# Patient Record
Sex: FEMALE | Race: ASIAN | NOT HISPANIC OR LATINO | URBAN - METROPOLITAN AREA
[De-identification: names, ages, dates, MRNs, and addresses within clinical notes are randomized per-mention and may not be internally consistent; named-entity substitution may affect disease eponyms.]

---

## 2017-07-29 ENCOUNTER — EMERGENCY (EMERGENCY)
Facility: HOSPITAL | Age: 37
LOS: 1 days | Discharge: HOME | End: 2017-07-29
Attending: EMERGENCY MEDICINE | Admitting: EMERGENCY MEDICINE
Payer: COMMERCIAL

## 2017-07-29 VITALS
DIASTOLIC BLOOD PRESSURE: 80 MMHG | RESPIRATION RATE: 16 BRPM | OXYGEN SATURATION: 100 % | TEMPERATURE: 98 F | HEART RATE: 90 BPM | SYSTOLIC BLOOD PRESSURE: 131 MMHG

## 2017-07-29 VITALS — HEIGHT: 62 IN | WEIGHT: 160.06 LBS

## 2017-07-29 LAB
ALBUMIN SERPL ELPH-MCNC: 3.8 G/DL — SIGNIFICANT CHANGE UP (ref 3.3–5)
ALP SERPL-CCNC: 77 U/L — SIGNIFICANT CHANGE UP (ref 40–120)
ALT FLD-CCNC: 7 U/L — SIGNIFICANT CHANGE UP (ref 4–33)
AST SERPL-CCNC: 9 U/L — SIGNIFICANT CHANGE UP (ref 4–32)
BILIRUB SERPL-MCNC: < 0.2 MG/DL — LOW (ref 0.2–1.2)
BUN SERPL-MCNC: 10 MG/DL — SIGNIFICANT CHANGE UP (ref 7–23)
CALCIUM SERPL-MCNC: 8.8 MG/DL — SIGNIFICANT CHANGE UP (ref 8.4–10.5)
CHLORIDE SERPL-SCNC: 107 MMOL/L — SIGNIFICANT CHANGE UP (ref 98–107)
CO2 SERPL-SCNC: 24 MMOL/L — SIGNIFICANT CHANGE UP (ref 22–31)
CREAT SERPL-MCNC: 0.73 MG/DL — SIGNIFICANT CHANGE UP (ref 0.5–1.3)
GLUCOSE SERPL-MCNC: 85 MG/DL — SIGNIFICANT CHANGE UP (ref 70–99)
MAGNESIUM SERPL-MCNC: 2.2 MG/DL — SIGNIFICANT CHANGE UP (ref 1.6–2.6)
PHOSPHATE SERPL-MCNC: 3.3 MG/DL — SIGNIFICANT CHANGE UP (ref 2.5–4.5)
POTASSIUM SERPL-MCNC: 4.4 MMOL/L — SIGNIFICANT CHANGE UP (ref 3.5–5.3)
POTASSIUM SERPL-SCNC: 4.4 MMOL/L — SIGNIFICANT CHANGE UP (ref 3.5–5.3)
PROT SERPL-MCNC: 7.1 G/DL — SIGNIFICANT CHANGE UP (ref 6–8.3)
SODIUM SERPL-SCNC: 144 MMOL/L — SIGNIFICANT CHANGE UP (ref 135–145)

## 2017-07-29 PROCEDURE — 99285 EMERGENCY DEPT VISIT HI MDM: CPT

## 2017-07-29 RX ORDER — METOCLOPRAMIDE HCL 10 MG
10 TABLET ORAL ONCE
Qty: 0 | Refills: 0 | Status: COMPLETED | OUTPATIENT
Start: 2017-07-29 | End: 2017-07-29

## 2017-07-29 RX ORDER — DIPHENHYDRAMINE HCL 50 MG
25 CAPSULE ORAL ONCE
Qty: 0 | Refills: 0 | Status: COMPLETED | OUTPATIENT
Start: 2017-07-29 | End: 2017-07-29

## 2017-07-29 RX ORDER — SODIUM CHLORIDE 9 MG/ML
1000 INJECTION INTRAMUSCULAR; INTRAVENOUS; SUBCUTANEOUS ONCE
Qty: 0 | Refills: 0 | Status: COMPLETED | OUTPATIENT
Start: 2017-07-29 | End: 2017-07-29

## 2017-07-29 RX ORDER — DIPHENHYDRAMINE HCL 50 MG
25 CAPSULE ORAL ONCE
Qty: 0 | Refills: 0 | Status: DISCONTINUED | OUTPATIENT
Start: 2017-07-29 | End: 2017-07-29

## 2017-07-29 RX ORDER — ACETAMINOPHEN 500 MG
650 TABLET ORAL ONCE
Qty: 0 | Refills: 0 | Status: COMPLETED | OUTPATIENT
Start: 2017-07-29 | End: 2017-07-29

## 2017-07-29 RX ADMIN — Medication 10 MILLIGRAM(S): at 20:57

## 2017-07-29 RX ADMIN — Medication 650 MILLIGRAM(S): at 20:58

## 2017-07-29 RX ADMIN — SODIUM CHLORIDE 1000 MILLILITER(S): 9 INJECTION INTRAMUSCULAR; INTRAVENOUS; SUBCUTANEOUS at 20:57

## 2017-07-29 RX ADMIN — Medication 25 MILLIGRAM(S): at 20:58

## 2017-07-29 RX ADMIN — Medication 650 MILLIGRAM(S): at 21:36

## 2017-07-29 NOTE — ED PROVIDER NOTE - OBJECTIVE STATEMENT
35 y/o F with PMH migraine, endometriosis , hypothyroid p/w left sided headache x 3 days. Pt. states she has had headache for the last 4-5 days and has been taking  Rizatriptan for the headache with mild improvement. Today she had some vomiting w/ the headache went to urgent care and was given ketorolac. She states she currently has a 8/10 headache. pt. denies fever chills, chest pain, SOB, abdominal pain, diarrhea. pt currently on OCP for birthcontrol for endometriosis.

## 2017-07-29 NOTE — ED PROVIDER NOTE - MEDICAL DECISION MAKING DETAILS
37 y/o F with PMH migraine, endometriosis , hypothyroid p/w left sided headache x 3 days. migraine, urine hcg, reglan, ivf, benadryl, cmp possibly mag . f/u with neurology

## 2017-07-29 NOTE — ED ADULT NURSE NOTE - OBJECTIVE STATEMENT
Report received from danya tinajero 930pm. Patient a&ox4, NAD, arrived to ed for severe migrane x 4-5 day unresolved by prescribed medications. States had episode of vomiting today as well. No vision changes, gait changes, or elevated BP. Patient evaluated by ed provider. Fluids infusing without s/s of infiltration to 20G IV in right AC. Pending blood results. Safety and comfort maintained. Family at bedside.

## 2017-07-29 NOTE — ED PROVIDER NOTE - ATTENDING CONTRIBUTION TO CARE
I performed a face to face evaluation of this patient and performed a full history and physical examination on the patient.  I agree with the resident's history, physical examination, and plan of the patient unless otherwise noted. My brief assessment is as follows: hx of migraines, typical headache, didn't respond to triptans and toradol, no neuro symptoms, not significant distressed. will try reglan/apap, check and consider mag with fluids, f/u neurology for further management. unlikely SAH, infectious, cvst, mass.

## 2017-07-29 NOTE — ED PROVIDER NOTE - CARE PLAN
Principal Discharge DX:	Migraine  Instructions for follow-up, activity and diet:	During your ED visit you were evaluated for migraine  You were given IVF, reglan&  tylenol with improvement. Follow up with your neurologist within 1 week. Consider adjustment of your birthcontrol after discussion with your neurologist and obgyn. Return to the ED if you exhibit any new, continued or worsening symptoms. Principal Discharge DX:	Migraine  Instructions for follow-up, activity and diet:	During your ED visit you were evaluated for migraine  You were given IVF, reglan&  tylenol with improvement. Follow up with your neurologist within 1 week. Consider adjustment of your birth control after discussion with your neurologist and obgyn. Return to the ED if you exhibit any new, continued or worsening symptoms.

## 2017-07-29 NOTE — ED ADULT TRIAGE NOTE - CHIEF COMPLAINT QUOTE
pt states that she has been having migraine headache for the last week, takes Rizatriptan for her headaches which relieves it temporarily.  Pt states that she went to urgent care and was given Toradol injection at 2pm without improvement.  Pt awake alert in NAD

## 2017-07-29 NOTE — ED PROVIDER NOTE - PLAN OF CARE
During your ED visit you were evaluated for migraine  You were given IVF, reglan&  tylenol with improvement. Follow up with your neurologist within 1 week. Consider adjustment of your birthcontrol after discussion with your neurologist and obgyn. Return to the ED if you exhibit any new, continued or worsening symptoms. During your ED visit you were evaluated for migraine  You were given IVF, reglan&  tylenol with improvement. Follow up with your neurologist within 1 week. Consider adjustment of your birth control after discussion with your neurologist and obgyn. Return to the ED if you exhibit any new, continued or worsening symptoms.

## 2017-11-12 ENCOUNTER — EMERGENCY (EMERGENCY)
Facility: HOSPITAL | Age: 37
LOS: 1 days | Discharge: HOME | End: 2017-11-12
Admitting: EMERGENCY MEDICINE
Payer: COMMERCIAL

## 2017-11-12 VITALS
HEART RATE: 89 BPM | OXYGEN SATURATION: 100 % | TEMPERATURE: 98 F | SYSTOLIC BLOOD PRESSURE: 130 MMHG | RESPIRATION RATE: 16 BRPM | DIASTOLIC BLOOD PRESSURE: 92 MMHG

## 2017-11-12 PROCEDURE — 99284 EMERGENCY DEPT VISIT MOD MDM: CPT | Mod: 25

## 2017-11-12 RX ORDER — METOCLOPRAMIDE HCL 10 MG
10 TABLET ORAL ONCE
Qty: 0 | Refills: 0 | Status: COMPLETED | OUTPATIENT
Start: 2017-11-12 | End: 2017-11-12

## 2017-11-12 RX ORDER — SODIUM CHLORIDE 9 MG/ML
1000 INJECTION INTRAMUSCULAR; INTRAVENOUS; SUBCUTANEOUS ONCE
Qty: 0 | Refills: 0 | Status: COMPLETED | OUTPATIENT
Start: 2017-11-12 | End: 2017-11-12

## 2017-11-12 RX ORDER — DIPHENHYDRAMINE HCL 50 MG
25 CAPSULE ORAL ONCE
Qty: 0 | Refills: 0 | Status: COMPLETED | OUTPATIENT
Start: 2017-11-12 | End: 2017-11-12

## 2017-11-12 NOTE — ED PROVIDER NOTE - CARE PLAN
Principal Discharge DX:	Migraine  Instructions for follow-up, activity and diet:	Follow up with your PMD within 48-72 hours.  Take tylenol 650 mg every 6 hours for pain. Follow up with Neurology within the week.  Worsening headache, dizziness, nausea, vomiting, weakness return to ER

## 2017-11-12 NOTE — ED PROVIDER NOTE - PLAN OF CARE
Follow up with your PMD within 48-72 hours.  Take tylenol 650 mg every 6 hours for pain. Follow up with Neurology within the week.  Worsening headache, dizziness, nausea, vomiting, weakness return to ER

## 2017-11-12 NOTE — ED PROVIDER NOTE - OBJECTIVE STATEMENT
36 y/o F PMH Lupus, endometriosis, on fertility tx (lupron), h/o migraines c/o gradual onset worsening occipital and left sided HA since last night. +associated nausea, vomiting (improved currently), photophobia. Pt states sxs similar to typical migraine. Pt typically takes triptan rescue med but was advised by fertility MD to d/c triptan while getting treatment. Pt has taken tylenol around the clock today with some relief. Notes she typically gets migraine right before her menses which is coming up soon. Denies fever, chills, CP, SOB, visual changes, numbness/tingling/weakness, difficulty ambulating, abdominal pain, dizziness.

## 2017-11-12 NOTE — ED PROVIDER NOTE - CHPI ED SYMPTOMS NEG
no blurred vision/no numbness/no fever/no loss of consciousness/no weakness/no change in level of consciousness/no confusion/no dizziness

## 2017-11-12 NOTE — ED ADULT TRIAGE NOTE - CHIEF COMPLAINT QUOTE
Pt. with c/o migraine and nausea started last night.  Denies visual changes.   Pt. stated she is currently taking fertility medications and unable to take migraine meds.  Pt. also on IVIG for lupus.

## 2017-11-13 DIAGNOSIS — Z98.890 OTHER SPECIFIED POSTPROCEDURAL STATES: Chronic | ICD-10-CM

## 2017-11-13 RX ADMIN — Medication 10 MILLIGRAM(S): at 00:33

## 2017-11-13 RX ADMIN — Medication 25 MILLIGRAM(S): at 00:33

## 2017-11-13 RX ADMIN — SODIUM CHLORIDE 1000 MILLILITER(S): 9 INJECTION INTRAMUSCULAR; INTRAVENOUS; SUBCUTANEOUS at 00:33

## 2018-11-06 ENCOUNTER — APPOINTMENT (OUTPATIENT)
Dept: OBGYN | Facility: CLINIC | Age: 38
End: 2018-11-06
Payer: COMMERCIAL

## 2018-11-06 VITALS
DIASTOLIC BLOOD PRESSURE: 80 MMHG | HEIGHT: 62 IN | BODY MASS INDEX: 28.34 KG/M2 | SYSTOLIC BLOOD PRESSURE: 118 MMHG | WEIGHT: 154 LBS

## 2018-11-06 DIAGNOSIS — Z80.42 FAMILY HISTORY OF MALIGNANT NEOPLASM OF PROSTATE: ICD-10-CM

## 2018-11-06 DIAGNOSIS — R76.8 OTHER SPECIFIED ABNORMAL IMMUNOLOGICAL FINDINGS IN SERUM: ICD-10-CM

## 2018-11-06 DIAGNOSIS — Z82.49 FAMILY HISTORY OF ISCHEMIC HEART DISEASE AND OTHER DISEASES OF THE CIRCULATORY SYSTEM: ICD-10-CM

## 2018-11-06 DIAGNOSIS — M25.50 PAIN IN UNSPECIFIED JOINT: ICD-10-CM

## 2018-11-06 DIAGNOSIS — F41.9 ANXIETY DISORDER, UNSPECIFIED: ICD-10-CM

## 2018-11-06 DIAGNOSIS — O09.299 SUPERVISION OF PREGNANCY WITH OTHER POOR REPRODUCTIVE OR OBSTETRIC HISTORY, UNSPECIFIED TRIMESTER: ICD-10-CM

## 2018-11-06 DIAGNOSIS — Z83.3 FAMILY HISTORY OF DIABETES MELLITUS: ICD-10-CM

## 2018-11-06 PROCEDURE — 76830 TRANSVAGINAL US NON-OB: CPT

## 2018-11-06 PROCEDURE — 99204 OFFICE O/P NEW MOD 45 MIN: CPT | Mod: 25

## 2018-11-06 RX ORDER — HYDROXYCHLOROQUINE SULFATE 200 MG/1
200 TABLET ORAL
Refills: 0 | Status: ACTIVE | COMMUNITY

## 2018-11-08 ENCOUNTER — APPOINTMENT (OUTPATIENT)
Dept: MATERNAL FETAL MEDICINE | Facility: CLINIC | Age: 38
End: 2018-11-08
Payer: COMMERCIAL

## 2018-11-08 ENCOUNTER — TRANSCRIPTION ENCOUNTER (OUTPATIENT)
Age: 38
End: 2018-11-08

## 2018-11-08 ENCOUNTER — APPOINTMENT (OUTPATIENT)
Dept: OBGYN | Facility: CLINIC | Age: 38
End: 2018-11-08

## 2018-11-08 ENCOUNTER — ASOB RESULT (OUTPATIENT)
Age: 38
End: 2018-11-08

## 2018-11-08 ENCOUNTER — APPOINTMENT (OUTPATIENT)
Dept: ANTEPARTUM | Facility: CLINIC | Age: 38
End: 2018-11-08
Payer: COMMERCIAL

## 2018-11-08 VITALS
SYSTOLIC BLOOD PRESSURE: 100 MMHG | WEIGHT: 154 LBS | HEIGHT: 62 IN | BODY MASS INDEX: 28.34 KG/M2 | DIASTOLIC BLOOD PRESSURE: 60 MMHG

## 2018-11-08 DIAGNOSIS — Z83.3 FAMILY HISTORY OF DIABETES MELLITUS: ICD-10-CM

## 2018-11-08 LAB
CREAT SPEC-SCNC: 204 MG/DL
CREAT/PROT UR: 0.1 RATIO
ENA SS-A AB SER IA-ACNC: <0.2 AL
ENA SS-B AB SER IA-ACNC: <0.2 AL
PROT UR-MCNC: 22 MG/DL

## 2018-11-08 PROCEDURE — 90656 IIV3 VACC NO PRSV 0.5 ML IM: CPT

## 2018-11-08 PROCEDURE — 90471 IMMUNIZATION ADMIN: CPT

## 2018-11-08 PROCEDURE — 76801 OB US < 14 WKS SINGLE FETUS: CPT

## 2018-11-08 RX ORDER — ENOXAPARIN SODIUM 100 MG/ML
40 INJECTION SUBCUTANEOUS DAILY
Qty: 30 | Refills: 9 | Status: DISCONTINUED | COMMUNITY
Start: 2018-11-08 | End: 2018-11-08

## 2018-11-09 ENCOUNTER — MED ADMIN CHARGE (OUTPATIENT)
Age: 38
End: 2018-11-09

## 2018-11-09 LAB
DSDNA AB SER-ACNC: <12 IU/ML
GLUCOSE 1H P 50 G GLC PO SERPL-MCNC: 112 MG/DL
HPV HIGH+LOW RISK DNA PNL CVX: NOT DETECTED

## 2018-11-12 LAB
ANA SER IF-ACNC: NEGATIVE
C3 SERPL-MCNC: 227 MG/DL
C4 SERPL-MCNC: 43 MG/DL
CYTOLOGY CVX/VAG DOC THIN PREP: NORMAL
TSH SERPL-ACNC: 1.75 UIU/ML

## 2018-11-19 ENCOUNTER — OTHER (OUTPATIENT)
Age: 38
End: 2018-11-19

## 2018-11-25 ENCOUNTER — EMERGENCY (EMERGENCY)
Facility: HOSPITAL | Age: 38
LOS: 1 days | Discharge: HOME | End: 2018-11-25
Attending: EMERGENCY MEDICINE | Admitting: EMERGENCY MEDICINE
Payer: COMMERCIAL

## 2018-11-25 VITALS
DIASTOLIC BLOOD PRESSURE: 51 MMHG | TEMPERATURE: 98 F | SYSTOLIC BLOOD PRESSURE: 101 MMHG | RESPIRATION RATE: 18 BRPM | OXYGEN SATURATION: 100 % | HEART RATE: 87 BPM

## 2018-11-25 VITALS
HEART RATE: 91 BPM | OXYGEN SATURATION: 100 % | RESPIRATION RATE: 18 BRPM | DIASTOLIC BLOOD PRESSURE: 71 MMHG | SYSTOLIC BLOOD PRESSURE: 127 MMHG | TEMPERATURE: 98 F

## 2018-11-25 DIAGNOSIS — Z90.722 ACQUIRED ABSENCE OF OVARIES, BILATERAL: Chronic | ICD-10-CM

## 2018-11-25 DIAGNOSIS — Z98.890 OTHER SPECIFIED POSTPROCEDURAL STATES: Chronic | ICD-10-CM

## 2018-11-25 LAB
ALBUMIN SERPL ELPH-MCNC: 3.9 G/DL — SIGNIFICANT CHANGE UP (ref 3.3–5)
ALP SERPL-CCNC: 77 U/L — SIGNIFICANT CHANGE UP (ref 40–120)
ALT FLD-CCNC: 13 U/L — SIGNIFICANT CHANGE UP (ref 4–33)
APPEARANCE UR: CLEAR — SIGNIFICANT CHANGE UP
AST SERPL-CCNC: 13 U/L — SIGNIFICANT CHANGE UP (ref 4–32)
BASOPHILS # BLD AUTO: 0.04 K/UL — SIGNIFICANT CHANGE UP (ref 0–0.2)
BASOPHILS NFR BLD AUTO: 0.3 % — SIGNIFICANT CHANGE UP (ref 0–2)
BILIRUB SERPL-MCNC: 0.2 MG/DL — SIGNIFICANT CHANGE UP (ref 0.2–1.2)
BILIRUB UR-MCNC: NEGATIVE — SIGNIFICANT CHANGE UP
BLOOD UR QL VISUAL: NEGATIVE — SIGNIFICANT CHANGE UP
BUN SERPL-MCNC: 3 MG/DL — LOW (ref 7–23)
CALCIUM SERPL-MCNC: 9.5 MG/DL — SIGNIFICANT CHANGE UP (ref 8.4–10.5)
CHLORIDE SERPL-SCNC: 102 MMOL/L — SIGNIFICANT CHANGE UP (ref 98–107)
CO2 SERPL-SCNC: 25 MMOL/L — SIGNIFICANT CHANGE UP (ref 22–31)
COLOR SPEC: COLORLESS — SIGNIFICANT CHANGE UP
CREAT SERPL-MCNC: 0.5 MG/DL — SIGNIFICANT CHANGE UP (ref 0.5–1.3)
EOSINOPHIL # BLD AUTO: 0.14 K/UL — SIGNIFICANT CHANGE UP (ref 0–0.5)
EOSINOPHIL NFR BLD AUTO: 1.2 % — SIGNIFICANT CHANGE UP (ref 0–6)
GLUCOSE SERPL-MCNC: 83 MG/DL — SIGNIFICANT CHANGE UP (ref 70–99)
GLUCOSE UR-MCNC: NEGATIVE — SIGNIFICANT CHANGE UP
HCG SERPL-ACNC: SIGNIFICANT CHANGE UP MIU/ML
HCT VFR BLD CALC: 40.1 % — SIGNIFICANT CHANGE UP (ref 34.5–45)
HGB BLD-MCNC: 13.7 G/DL — SIGNIFICANT CHANGE UP (ref 11.5–15.5)
IMM GRANULOCYTES # BLD AUTO: 0.09 # — SIGNIFICANT CHANGE UP
IMM GRANULOCYTES NFR BLD AUTO: 0.8 % — SIGNIFICANT CHANGE UP (ref 0–1.5)
KETONES UR-MCNC: NEGATIVE — SIGNIFICANT CHANGE UP
LEUKOCYTE ESTERASE UR-ACNC: NEGATIVE — SIGNIFICANT CHANGE UP
LYMPHOCYTES # BLD AUTO: 26 % — SIGNIFICANT CHANGE UP (ref 13–44)
LYMPHOCYTES # BLD AUTO: 3 K/UL — SIGNIFICANT CHANGE UP (ref 1–3.3)
MCHC RBC-ENTMCNC: 25.7 PG — LOW (ref 27–34)
MCHC RBC-ENTMCNC: 34.2 % — SIGNIFICANT CHANGE UP (ref 32–36)
MCV RBC AUTO: 75.2 FL — LOW (ref 80–100)
MONOCYTES # BLD AUTO: 0.68 K/UL — SIGNIFICANT CHANGE UP (ref 0–0.9)
MONOCYTES NFR BLD AUTO: 5.9 % — SIGNIFICANT CHANGE UP (ref 2–14)
NEUTROPHILS # BLD AUTO: 7.58 K/UL — HIGH (ref 1.8–7.4)
NEUTROPHILS NFR BLD AUTO: 65.8 % — SIGNIFICANT CHANGE UP (ref 43–77)
NITRITE UR-MCNC: NEGATIVE — SIGNIFICANT CHANGE UP
NRBC # FLD: 0 — SIGNIFICANT CHANGE UP
PH UR: 6.5 — SIGNIFICANT CHANGE UP (ref 5–8)
PLATELET # BLD AUTO: 333 K/UL — SIGNIFICANT CHANGE UP (ref 150–400)
PMV BLD: 9.9 FL — SIGNIFICANT CHANGE UP (ref 7–13)
POTASSIUM SERPL-MCNC: 3.7 MMOL/L — SIGNIFICANT CHANGE UP (ref 3.5–5.3)
POTASSIUM SERPL-SCNC: 3.7 MMOL/L — SIGNIFICANT CHANGE UP (ref 3.5–5.3)
PROT SERPL-MCNC: 7.3 G/DL — SIGNIFICANT CHANGE UP (ref 6–8.3)
PROT UR-MCNC: NEGATIVE — SIGNIFICANT CHANGE UP
RBC # BLD: 5.33 M/UL — HIGH (ref 3.8–5.2)
RBC # FLD: 14.6 % — HIGH (ref 10.3–14.5)
SODIUM SERPL-SCNC: 136 MMOL/L — SIGNIFICANT CHANGE UP (ref 135–145)
SP GR SPEC: 1.01 — SIGNIFICANT CHANGE UP (ref 1–1.04)
UROBILINOGEN FLD QL: NORMAL — SIGNIFICANT CHANGE UP
WBC # BLD: 11.53 K/UL — HIGH (ref 3.8–10.5)
WBC # FLD AUTO: 11.53 K/UL — HIGH (ref 3.8–10.5)

## 2018-11-25 PROCEDURE — 99284 EMERGENCY DEPT VISIT MOD MDM: CPT

## 2018-11-25 RX ORDER — SODIUM CHLORIDE 9 MG/ML
1000 INJECTION INTRAMUSCULAR; INTRAVENOUS; SUBCUTANEOUS ONCE
Qty: 0 | Refills: 0 | Status: COMPLETED | OUTPATIENT
Start: 2018-11-25 | End: 2018-11-25

## 2018-11-25 RX ORDER — ACETAMINOPHEN 500 MG
975 TABLET ORAL ONCE
Qty: 0 | Refills: 0 | Status: COMPLETED | OUTPATIENT
Start: 2018-11-25 | End: 2018-11-25

## 2018-11-25 RX ORDER — METOCLOPRAMIDE HCL 10 MG
10 TABLET ORAL ONCE
Qty: 0 | Refills: 0 | Status: COMPLETED | OUTPATIENT
Start: 2018-11-25 | End: 2018-11-25

## 2018-11-25 RX ADMIN — Medication 10 MILLIGRAM(S): at 21:08

## 2018-11-25 RX ADMIN — Medication 975 MILLIGRAM(S): at 21:08

## 2018-11-25 RX ADMIN — SODIUM CHLORIDE 1000 MILLILITER(S): 9 INJECTION INTRAMUSCULAR; INTRAVENOUS; SUBCUTANEOUS at 21:08

## 2018-11-25 NOTE — ED PROVIDER NOTE - PROGRESS NOTE DETAILS
MARCO ROSE: bedside u/s done by dr. Olivia Llanos shows , all appears wnl, will get labs, symptomatic tx for headache an dreasses MARCO ROSE: pt states that shes feeling better, will d/c and she will f/u with her obgyn

## 2018-11-25 NOTE — ED PROVIDER NOTE - OBJECTIVE STATEMENT
37 y/o F presently x12wks pregnant via in-vitro fertilization a1 w/ PMHx presents to ED c/o x1wk of intermittent pelvic cramping, and low back pain; cramping noted to be worsening x1day. Pt also reports white vaginal discharge. OB is Santo Calderon and pt states she has not missed any appointments. Pt has h/o Migraines which was controlled via medication but can no longer take medication due to pregnancy. Denies dysuria, vaginal bleeding/spotting, and other complaints. No sexual activity since pregnancy. 39 y/o F presently x12wks pregnant via in-vitro fertilization a1 w/ PMHx of Lupus presents to ED c/o x1wk of intermittent pelvic cramping, and low back pain; cramping noted to be worsening x1day. Pt also reports white vaginal discharge. OB is Santo Calderon and pt states she has not missed any appointments. Pt has h/o Migraines which was controlled via medication but can no longer take medication due to pregnancy. Denies dysuria, vaginal bleeding/spotting, and other complaints. No sexual activity since pregnancy. 39 y/o F presently x12wks pregnant via in-vitro fertilization a1 w/ PMHx of Lupus presents to ED c/o x1wk of intermittent pelvic cramping, and low back pain; cramping noted to be worsening x1day. OB is Santo Calderon and pt states she has not missed any appointments. Pt has h/o Migraines which was controlled via medication but can no longer take medication due to pregnancy. Denies any dizziness, visual disturbances, neck pain, f/c/n/v/d, chest pain, sob, urinary symptoms, pelvic discharge,  numbness/weakness/tingling, recent travel, sick contact, social history, recent sexual activity

## 2018-11-25 NOTE — ED PROVIDER NOTE - ATTENDING CONTRIBUTION TO CARE
Llanos: 38 yof with hx of 1 miscarriage, 12 weeks pregnant by IVF c/o headache similar to previous migraine episodes, also with occasional intermittent crampying, confirmed IUP previously. On exam, pt is well appearing, no distress, neuro intact and abd soft and non tender, skin shows multiple areas of ecchymosis. US confirms live iup with good fetal movement and pt is much relieved as she was very anxious about this. Sx relief for migraine and outpt gyn follow up. pelvic exam deferred as no complaints of bleeding or heavy discharge, concern for std.

## 2018-11-25 NOTE — ED PROVIDER NOTE - NSFOLLOWUPINSTRUCTIONS_ED_ALL_ED_FT
[pls rest, drink plenty of fluids, tylenol for headache, f/u with your obgyn asap, return for any worsening symptoms or any other concerning symptoms

## 2018-11-27 PROBLEM — E06.3 AUTOIMMUNE THYROIDITIS: Chronic | Status: ACTIVE | Noted: 2018-11-25

## 2018-11-27 PROBLEM — E03.9 HYPOTHYROIDISM, UNSPECIFIED: Chronic | Status: ACTIVE | Noted: 2018-11-25

## 2018-11-30 ENCOUNTER — ASOB RESULT (OUTPATIENT)
Age: 38
End: 2018-11-30

## 2018-11-30 ENCOUNTER — APPOINTMENT (OUTPATIENT)
Dept: ANTEPARTUM | Facility: CLINIC | Age: 38
End: 2018-11-30
Payer: COMMERCIAL

## 2018-11-30 PROCEDURE — 76813 OB US NUCHAL MEAS 1 GEST: CPT

## 2018-11-30 PROCEDURE — 36416 COLLJ CAPILLARY BLOOD SPEC: CPT

## 2018-12-04 ENCOUNTER — APPOINTMENT (OUTPATIENT)
Dept: OBGYN | Facility: CLINIC | Age: 38
End: 2018-12-04
Payer: COMMERCIAL

## 2018-12-04 ENCOUNTER — NON-APPOINTMENT (OUTPATIENT)
Age: 38
End: 2018-12-04

## 2018-12-04 ENCOUNTER — LABORATORY RESULT (OUTPATIENT)
Age: 38
End: 2018-12-04

## 2018-12-04 VITALS
SYSTOLIC BLOOD PRESSURE: 102 MMHG | BODY MASS INDEX: 28.89 KG/M2 | WEIGHT: 157 LBS | DIASTOLIC BLOOD PRESSURE: 70 MMHG | HEIGHT: 62 IN

## 2018-12-04 DIAGNOSIS — Z87.59 PERSONAL HISTORY OF OTHER COMPLICATIONS OF PREGNANCY, CHILDBIRTH AND THE PUERPERIUM: ICD-10-CM

## 2018-12-04 PROCEDURE — 0501F PRENATAL FLOW SHEET: CPT

## 2018-12-05 LAB
ABO + RH PNL BLD: NORMAL
ALBUMIN SERPL ELPH-MCNC: 4 G/DL
ALP BLD-CCNC: 90 U/L
ALT SERPL-CCNC: 15 U/L
ANION GAP SERPL CALC-SCNC: 13 MMOL/L
AST SERPL-CCNC: 14 U/L
B19V IGG SER QL IA: 6.7 INDEX
B19V IGG+IGM SER-IMP: NORMAL
B19V IGG+IGM SER-IMP: POSITIVE
B19V IGM FLD-ACNC: 0.1 INDEX
B19V IGM SER-ACNC: NEGATIVE
BASOPHILS # BLD AUTO: 0.02 K/UL
BASOPHILS NFR BLD AUTO: 0.2 %
BILIRUB SERPL-MCNC: <0.2 MG/DL
BLD GP AB SCN SERPL QL: NORMAL
BUN SERPL-MCNC: 4 MG/DL
CALCIUM SERPL-MCNC: 10 MG/DL
CHLORIDE SERPL-SCNC: 101 MMOL/L
CMV IGG SERPL QL: 6.7 U/ML
CMV IGG SERPL-IMP: POSITIVE
CMV IGM SERPL QL: <8 AU/ML
CMV IGM SERPL QL: NEGATIVE
CO2 SERPL-SCNC: 21 MMOL/L
CREAT SERPL-MCNC: 0.44 MG/DL
EOSINOPHIL # BLD AUTO: 0.08 K/UL
EOSINOPHIL NFR BLD AUTO: 0.8 %
GLUCOSE SERPL-MCNC: 77 MG/DL
HBV SURFACE AG SER QL: NONREACTIVE
HCT VFR BLD CALC: 36.2 %
HCV AB SER QL: NONREACTIVE
HCV S/CO RATIO: 0.11 S/CO
HGB BLD-MCNC: 12.7 G/DL
HIV1+2 AB SPEC QL IA.RAPID: NONREACTIVE
IMM GRANULOCYTES NFR BLD AUTO: 0.6 %
LYMPHOCYTES # BLD AUTO: 2.47 K/UL
LYMPHOCYTES NFR BLD AUTO: 24.3 %
MAN DIFF?: NORMAL
MCHC RBC-ENTMCNC: 25.9 PG
MCHC RBC-ENTMCNC: 35.1 GM/DL
MCV RBC AUTO: 73.9 FL
MONOCYTES # BLD AUTO: 0.63 K/UL
MONOCYTES NFR BLD AUTO: 6.2 %
NEUTROPHILS # BLD AUTO: 6.9 K/UL
NEUTROPHILS NFR BLD AUTO: 67.9 %
PLATELET # BLD AUTO: 308 K/UL
POTASSIUM SERPL-SCNC: 4.7 MMOL/L
PROT SERPL-MCNC: 6.8 G/DL
RBC # BLD: 4.9 M/UL
RBC # FLD: 15.1 %
RUBV IGG FLD-ACNC: 27.4 INDEX
RUBV IGG SER-IMP: POSITIVE
SODIUM SERPL-SCNC: 135 MMOL/L
T PALLIDUM AB SER QL IA: NEGATIVE
TSH SERPL-ACNC: 2.1 UIU/ML
VZV AB TITR SER: NEGATIVE
VZV IGG SER IF-ACNC: 61.2 INDEX
WBC # FLD AUTO: 10.16 K/UL

## 2018-12-07 LAB
BACTERIA UR CULT: NORMAL
LEAD BLD-MCNC: 1 UG/DL

## 2018-12-10 LAB
AR GENE MUT ANL BLD/T: NEGATIVE
CFTR MUT TESTED BLD/T: NEGATIVE
FMR1 GENE MUT ANL BLD/T: NORMAL

## 2018-12-11 LAB
CLARI ADDITIONAL INFO: NORMAL
CLARI CHROMOSOME 13: NORMAL
CLARI CHROMOSOME 18: NORMAL
CLARI CHROMOSOME 21: NORMAL
CLARI SEX CHROMOSOMES: NORMAL
CLARITEST NIPT: NORMAL

## 2018-12-20 ENCOUNTER — ASOB RESULT (OUTPATIENT)
Age: 38
End: 2018-12-20

## 2018-12-20 ENCOUNTER — APPOINTMENT (OUTPATIENT)
Dept: ANTEPARTUM | Facility: CLINIC | Age: 38
End: 2018-12-20
Payer: COMMERCIAL

## 2018-12-20 PROCEDURE — 76805 OB US >/= 14 WKS SNGL FETUS: CPT

## 2019-01-05 ENCOUNTER — EMERGENCY (EMERGENCY)
Facility: HOSPITAL | Age: 39
LOS: 1 days | Discharge: HOME | End: 2019-01-05
Attending: EMERGENCY MEDICINE
Payer: COMMERCIAL

## 2019-01-05 VITALS
HEART RATE: 118 BPM | HEIGHT: 62 IN | RESPIRATION RATE: 16 BRPM | OXYGEN SATURATION: 100 % | SYSTOLIC BLOOD PRESSURE: 106 MMHG | WEIGHT: 158.07 LBS | TEMPERATURE: 98 F | DIASTOLIC BLOOD PRESSURE: 56 MMHG

## 2019-01-05 DIAGNOSIS — Z98.890 OTHER SPECIFIED POSTPROCEDURAL STATES: Chronic | ICD-10-CM

## 2019-01-05 DIAGNOSIS — Z90.49 ACQUIRED ABSENCE OF OTHER SPECIFIED PARTS OF DIGESTIVE TRACT: Chronic | ICD-10-CM

## 2019-01-05 DIAGNOSIS — Z90.722 ACQUIRED ABSENCE OF OVARIES, BILATERAL: Chronic | ICD-10-CM

## 2019-01-05 LAB
APPEARANCE UR: ABNORMAL
BILIRUB UR-MCNC: NEGATIVE — SIGNIFICANT CHANGE UP
COLOR SPEC: SIGNIFICANT CHANGE UP
DIFF PNL FLD: NEGATIVE — SIGNIFICANT CHANGE UP
GLUCOSE UR QL: NEGATIVE — SIGNIFICANT CHANGE UP
KETONES UR-MCNC: NEGATIVE — SIGNIFICANT CHANGE UP
LEUKOCYTE ESTERASE UR-ACNC: ABNORMAL
NITRITE UR-MCNC: NEGATIVE — SIGNIFICANT CHANGE UP
PH UR: 7.5 — SIGNIFICANT CHANGE UP (ref 5–8)
PROT UR-MCNC: NEGATIVE — SIGNIFICANT CHANGE UP
SP GR SPEC: 1.01 — LOW (ref 1.01–1.02)
UROBILINOGEN FLD QL: NEGATIVE — SIGNIFICANT CHANGE UP

## 2019-01-05 PROCEDURE — 76815 OB US LIMITED FETUS(S): CPT | Mod: 26

## 2019-01-05 PROCEDURE — 99284 EMERGENCY DEPT VISIT MOD MDM: CPT

## 2019-01-05 PROCEDURE — 76815 OB US LIMITED FETUS(S): CPT

## 2019-01-05 PROCEDURE — 81001 URINALYSIS AUTO W/SCOPE: CPT

## 2019-01-05 PROCEDURE — 87086 URINE CULTURE/COLONY COUNT: CPT

## 2019-01-05 RX ORDER — CEPHALEXIN 500 MG
500 CAPSULE ORAL ONCE
Qty: 0 | Refills: 0 | Status: COMPLETED | OUTPATIENT
Start: 2019-01-05 | End: 2019-01-05

## 2019-01-05 RX ORDER — CEPHALEXIN 500 MG
1 CAPSULE ORAL
Qty: 28 | Refills: 0 | OUTPATIENT
Start: 2019-01-05 | End: 2019-01-11

## 2019-01-05 RX ORDER — ACETAMINOPHEN 500 MG
650 TABLET ORAL ONCE
Qty: 0 | Refills: 0 | Status: COMPLETED | OUTPATIENT
Start: 2019-01-05 | End: 2019-01-05

## 2019-01-05 RX ADMIN — Medication 650 MILLIGRAM(S): at 22:44

## 2019-01-05 RX ADMIN — Medication 650 MILLIGRAM(S): at 23:00

## 2019-01-05 RX ADMIN — Medication 500 MILLIGRAM(S): at 23:51

## 2019-01-05 NOTE — ED PROVIDER NOTE - OBJECTIVE STATEMENT
37 yo  f pmh hypothyroidism, lupus on lovenox and hydrochloroquine, hx spontaneous , 18 weeks pregnant by IVF, placenta previa, pw pelvic pain. pt states pain is pressure-like, localized, worse with standing, better with laying down flat, reports feels like something "dropped". pt states pain is similar to previous  at 5 months however not nearly as severe. states fetal movement is at baseline. reports two day hx dysuria. reports having normal BMs and is passing gas normally. pt denies trauma, vaginal dc, vaginal bleeding, hematuria.

## 2019-01-05 NOTE — ED PROVIDER NOTE - NS ED ROS FT
GENERAL: No fever or chills, //             EYES: no change in vision, //             HEENT: no trouble swallowing or speaking, //             CARDIAC: no chest pain, //              PULMONARY: no cough or SOB, //             GI: lower abd pain, //             : dysuria,  //            SKIN: no rashes,  //            NEURO: no headache,  //             MSK: No joint pain otherwise as HPI or negative. ~Tayo Montenegro DO PGY1

## 2019-01-05 NOTE — ED ADULT NURSE NOTE - OBJECTIVE STATEMENT
patient came in complaining of pressure pain to mid lower abdomen. Increases when standing up. (+) burning pain on urination. Onset x 3days. Denies hematuria, fever or difficulty urinating although at times "not emptying fully" 18 weeks pregnant. Denies bleeding or back pain.

## 2019-01-05 NOTE — ED PROVIDER NOTE - PHYSICAL EXAMINATION
General: well appearing, interactive, well nourished, no apparent distress  HEENT: EOMI, PERRLA, normal mucosa, normal oropharynx, no lesions on the lips or on oral mucosa, normal external ear  Neck: supple, no lymphadenopathy, full range of motion  CV: RRR, normal S1 and S2 with no murmur  Resp: lungs CTA b/l, good aeration bilaterally, symmetric chest wall   Abd: non-distended, soft, ttp in suprapubic region  : no CVA tenderness  MSK: full range of motion, no cyanosis, no edema, no clubbing, no immobility  Neuro: muscle strength 5/5 in all extremities, normal gait  Skin: no rashes, skin intact

## 2019-01-05 NOTE — CONSULT NOTE ADULT - SUBJECTIVE AND OBJECTIVE BOX
R2 GYN ED CONSULT NOTE    SUBJECTIVE:    39yo  at 18w2d presents with pelvic pain.  Patient reports intermittent crampy suprapubic pain that is 7/10 and is exacerbated by standing and urination.  She also endorses burning dysuria, urgency and frequency.  She denies fever, chills, nausea, vomiting, diarrhea, headache, constipation, dizzyness, syncope, chest pain, palpitations, shortness of breath, vaginal bleeding/discharge/odor/pain/itching, breast lumps/bumps, dyspareunia.  Prenatal course is significant for IVF pregnancy, AMA, placenta previa, Lupus and hypothyroidism.      In ED today the pt is afebrile with VSS.  UA showed elevated WBC/bacteria/leuk esterase.  TAUS showed , CL 3.1.      Primary OB/GYN: Santo Calderon  OBH: ; 26wk IUFD; pt is s/p 7 cycles IVF  GYNH: stage IV endometriosis, fibroid uterus, uterine polyp; denies abnormal paps/STIs  PMSH: Lupus, Hypothyroidism, s/p hysteroscopy D&C, s/p Dx LSC x4 (appendectomy, bilat salpingectomy, endometrioisis/SHANTHI)  MEDS: plaquenil 200mg bid, synthroid 112mcg, lovenox, ASA 120mg  ALL: nkda  SOCH: denies t/e/d; safe at home  ROS: negative except per hpi    OBJECTIVE:    Vital Signs Last 24 Hrs  T(C): 36.7 (2019 22:45), Max: 36.7 (2019 22:45)  T(F): 98 (2019 22:45), Max: 98 (2019 22:45)  HR: 94 (2019 22:45) (94 - 118)  BP: 114/70 (2019 22:45) (106/56 - 114/70)  RR: 18 (2019 22:45) (16 - 18)  SpO2: 99% (2019 22:45) (99% - 100%)  Height (cm): 157.48 (19 @ 17:03)  Weight (kg): 71.7 (19 @ 17:03)  BMI (kg/m2): 28.9 (19 @ 17:03)  BSA (m2): 1.73 (19 @ 17:03)    PHYSICAL EXAM:  GEN: NAD, A&Ox3  CV: RRR, no m/g/r  LUNGS: CTAB  ABD: soft, ND, +BS, mild suprapubic tenderness to palpation, no rebound/gaurding  SPECULUM: cervix appears closed, copious white discharge  PELVIC: deferred  EXT: WWP, no edema/TTP    LABS:    Urinalysis Basic - ( 2019 21:11 )    Color: Light Yellow / Appearance: Slightly Turbid / S.007 / pH: x  Gluc: x / Ketone: Negative  / Bili: Negative / Urobili: Negative   Blood: x / Protein: Negative / Nitrite: Negative   Leuk Esterase: Moderate / RBC: 2 /hpf / WBC 7 /hpf   Sq Epi: x / Non Sq Epi: 16 /hpf / Bacteria: Few      RADIOLOGY:    < from: US OB Fetus Limited (19 @ 21:11) >  EXAM:  US OB LTD FETUS(ES)                            *** ADDENDUM 2019  ***    The cervical length measures 3.1 cm. However the evaluation is limited because only transabdominal scan was performed.    *** END OF ADDENDUM 2019  ***      PROCEDURE DATE:  2019    INTERPRETATION:  x CLINICAL INFORMATION: Pelvic pain. 18 weeks pregnant via IVF. History of miscarriage. Known placenta previa as per patient.  TECHNIQUE: Transabdominal pelvic sonogram was performed.  COMPARISON: None.  LMP unknown..  FINDINGS:  UTERUS: Gravid.  CERVIX: Closed.   PLACENTA: Placenta previa.   AMNIOTIC FLUID: Within normal limits.  FETAL PRESENTATION: Transverse with head to maternal left  FETAL MEASUREMENTS:   BPD: 4.07 cm.Fetal age: 18 weeks 2 days.  HC: 15.4 cm. Fetal age: 18 weeks 3 days.  FL: 2.4 cm. Fetal age 18 weeks 5 days.  AC: 12.8 cm. Fetal age; 18 weeks 3 days.  GESTATIONAL AGE: 18 weeks 5 days by femur length.  FETAL HEART RATE: 160 bpm  The ovaries are not visualized on this study.  There is no pelvic free fluid.  IMPRESSION:  Viable pregnancy with a gestational age of 18 weeks 5 days by femur length.  Please note, a detailed fetal anatomic survey was not performed.      ***Please see theaddendum at the top of this report. It may contain   additional important information or changes.****    SVITLANA LOZANO M.D., RADIOLOGY RESIDENT  This document has been electronically signed.  CUCA HERZOG M.D., ATTENDING RADIOLOGIST  This document hasbeen electronically signed. 2019 10:03PM  Addend:  CUCA HERZOG M.D., ATTENDING RADIOLOGIST  This addendum was electronically signed on: 2019 10:42PM.    < end of copied text >

## 2019-01-05 NOTE — ED PROVIDER NOTE - ATTENDING CONTRIBUTION TO CARE
39 yo  f pmh hypothyroidism, lupus on lovenox and hydrochloroquine, hx spontaneous , 18 weeks pregnant by IVF, placenta previa, presents with suprapubic pelvic cramping pain since yesterday. rated 7/10 at this time. feels that the cramping is "moving lower". + dysuria for 2 days.   no f/ch, uri symptoms, cough, cp, sob, D, vaginal bleeding/discharge  PE lungs CTA. abd with + suprapubic TTP. no adnexal TTP. no flank/back pain. 39 yo  f pmh hypothyroidism, lupus on lovenox and hydrochloroquine, hx spontaneous , 18 weeks pregnant by IVF, placenta previa, presents with suprapubic pelvic cramping pain since yesterday. rated 7/10 at this time. feels that the cramping is "moving lower". + dysuria for 2 days.   no f/ch, uri symptoms, cough, cp, sob, D, vaginal bleeding/discharge  PE lungs CTA. abd with + suprapubic TTP. no adnexal TTP. no flank/back pain.  UA with bacteira. US showing viable fetus and redomonstrating placenta previa. obgyn consulted. patient started on keflex abx for uti. obgyn cleared pt for dc. Patient was discharged with instructions to rest, course of keflex , and follow up with PMD and obgyn in 1-2 days for repeat evaluation and further management.    I reviewed all results from this ED visit, and discussed ALL results with the patient and/or family, including all abnormal results and incidental findings. All questions/concerns were addressed, and I recommended appropriate follow up for all findings. All discharge instructions were thoroughly discussed with the patient and/or family, as well as important warning signs and new/ worsening symptoms which should necessitate patient's immediate return to the ED. The patient expressed understanding of all results discussed and follow up instructions given.The patient was agreeable with discharge and was discharged from the ED in stable condition.

## 2019-01-05 NOTE — ED PROVIDER NOTE - NSFOLLOWUPINSTRUCTIONS_ED_ALL_ED_FT
1) Please follow up with your Primary Care Provider in 24-48 hours  2) Seek immediate medical care for any new or returning symptoms including but not limited to severe pain, high fevers, difficulty breathing  3) Take Keflex 500 mg four times a day for 7 days  4) Please follow up with your OB within 24-48 hours

## 2019-01-05 NOTE — CONSULT NOTE ADULT - ASSESSMENT
39yo  at 18w2d presents with ssx and labs concerning for UTI.    RECOMMENDATIONS   - keflex 500mg q6h x7d   - f/u Urine Cx   - increase hydration   - pt instructed to keep appt on  w/ Dr. Calderon for f/u    d/w Dr. Miguel Bird MD PGY2

## 2019-01-05 NOTE — ED PROVIDER NOTE - PROGRESS NOTE DETAILS
ob paged Patient reassessed, NAD, non-toxic appearing. seen by OB. pt feels well for dc. will tx for uti. pt will f/u with ob.   - Tayo Montenegro D.O. PGY1

## 2019-01-05 NOTE — ED ADULT NURSE NOTE - NSIMPLEMENTINTERV_GEN_ALL_ED
Implemented All Universal Safety Interventions:  [place] to call system. Call bell, personal items and telephone within reach. Instruct patient to call for assistance. Room bathroom lighting operational. Non-slip footwear when patient is off stretcher. Physically safe environment: no spills, clutter or unnecessary equipment. Stretcher in lowest position, wheels locked, appropriate side rails in place.

## 2019-01-06 VITALS
DIASTOLIC BLOOD PRESSURE: 72 MMHG | RESPIRATION RATE: 18 BRPM | OXYGEN SATURATION: 98 % | SYSTOLIC BLOOD PRESSURE: 105 MMHG | HEART RATE: 93 BPM | TEMPERATURE: 98 F

## 2019-01-06 LAB
CULTURE RESULTS: SIGNIFICANT CHANGE UP
SPECIMEN SOURCE: SIGNIFICANT CHANGE UP

## 2019-01-07 NOTE — ED POST DISCHARGE NOTE - DETAILS
Spoke with patient and informed her of contaminated urine.  Patient reports that her pain has improved.  Recommended continuing Abx as prescribed and to follow up with her OBGYN for repeat culture.  -Bereket Amador PA-C

## 2019-01-08 ENCOUNTER — APPOINTMENT (OUTPATIENT)
Dept: OBGYN | Facility: CLINIC | Age: 39
End: 2019-01-08
Payer: COMMERCIAL

## 2019-01-08 ENCOUNTER — NON-APPOINTMENT (OUTPATIENT)
Age: 39
End: 2019-01-08

## 2019-01-08 VITALS
WEIGHT: 159 LBS | BODY MASS INDEX: 29.26 KG/M2 | SYSTOLIC BLOOD PRESSURE: 114 MMHG | HEIGHT: 62 IN | DIASTOLIC BLOOD PRESSURE: 77 MMHG

## 2019-01-08 PROCEDURE — 0502F SUBSEQUENT PRENATAL CARE: CPT

## 2019-01-11 LAB — BACTERIA UR CULT: NORMAL

## 2019-01-17 ENCOUNTER — ASOB RESULT (OUTPATIENT)
Age: 39
End: 2019-01-17

## 2019-01-17 ENCOUNTER — APPOINTMENT (OUTPATIENT)
Dept: ANTEPARTUM | Facility: CLINIC | Age: 39
End: 2019-01-17
Payer: COMMERCIAL

## 2019-01-17 PROBLEM — L93.0 DISCOID LUPUS ERYTHEMATOSUS: Chronic | Status: ACTIVE | Noted: 2019-01-05

## 2019-01-17 LAB
1ST TRIMESTER DATA: NORMAL
2ND TRIMESTER DATA: NORMAL
AFP PNL SERPL: NORMAL
AFP SERPL-ACNC: NORMAL
AFP SERPL-ACNC: NORMAL
B-HCG FREE SERPL-MCNC: NORMAL
CLINICAL BIOCHEMIST REVIEW: NORMAL
FREE BETA HCG 1ST TRIMESTER: NORMAL
INHIBIN A SERPL-MCNC: NORMAL
NOTES NTD: NORMAL
NT: NORMAL
PAPP-A SERPL-ACNC: NORMAL
U ESTRIOL SERPL-SCNC: NORMAL

## 2019-01-17 PROCEDURE — 76811 OB US DETAILED SNGL FETUS: CPT

## 2019-01-17 PROCEDURE — 76817 TRANSVAGINAL US OBSTETRIC: CPT | Mod: 59

## 2019-01-22 ENCOUNTER — RESULT REVIEW (OUTPATIENT)
Age: 39
End: 2019-01-22

## 2019-01-22 LAB
BACTERIA UR CULT: ABNORMAL
TSH SERPL-ACNC: 0.67 UIU/ML

## 2019-01-28 ENCOUNTER — ASOB RESULT (OUTPATIENT)
Age: 39
End: 2019-01-28

## 2019-01-28 ENCOUNTER — APPOINTMENT (OUTPATIENT)
Dept: ANTEPARTUM | Facility: CLINIC | Age: 39
End: 2019-01-28
Payer: COMMERCIAL

## 2019-01-28 ENCOUNTER — RX CHANGE (OUTPATIENT)
Age: 39
End: 2019-01-28

## 2019-01-28 PROCEDURE — 93325 DOPPLER ECHO COLOR FLOW MAPG: CPT

## 2019-01-28 PROCEDURE — 76825 ECHO EXAM OF FETAL HEART: CPT

## 2019-01-28 PROCEDURE — 76827 ECHO EXAM OF FETAL HEART: CPT

## 2019-01-29 ENCOUNTER — NON-APPOINTMENT (OUTPATIENT)
Age: 39
End: 2019-01-29

## 2019-01-29 ENCOUNTER — APPOINTMENT (OUTPATIENT)
Dept: OBGYN | Facility: CLINIC | Age: 39
End: 2019-01-29
Payer: COMMERCIAL

## 2019-01-29 VITALS
DIASTOLIC BLOOD PRESSURE: 81 MMHG | HEIGHT: 62 IN | SYSTOLIC BLOOD PRESSURE: 124 MMHG | BODY MASS INDEX: 29.63 KG/M2 | WEIGHT: 161 LBS

## 2019-01-29 PROCEDURE — 0502F SUBSEQUENT PRENATAL CARE: CPT

## 2019-02-05 ENCOUNTER — CLINICAL ADVICE (OUTPATIENT)
Age: 39
End: 2019-02-05

## 2019-02-06 ENCOUNTER — INPATIENT (INPATIENT)
Facility: HOSPITAL | Age: 39
LOS: 1 days | Discharge: HOME | DRG: 833 | End: 2019-02-08
Attending: OBSTETRICS & GYNECOLOGY | Admitting: OBSTETRICS & GYNECOLOGY
Payer: COMMERCIAL

## 2019-02-06 VITALS — WEIGHT: 158.73 LBS | HEIGHT: 62 IN

## 2019-02-06 DIAGNOSIS — Z98.890 OTHER SPECIFIED POSTPROCEDURAL STATES: Chronic | ICD-10-CM

## 2019-02-06 DIAGNOSIS — Z90.722 ACQUIRED ABSENCE OF OVARIES, BILATERAL: Chronic | ICD-10-CM

## 2019-02-06 DIAGNOSIS — Z34.80 ENCOUNTER FOR SUPERVISION OF OTHER NORMAL PREGNANCY, UNSPECIFIED TRIMESTER: ICD-10-CM

## 2019-02-06 DIAGNOSIS — Z90.49 ACQUIRED ABSENCE OF OTHER SPECIFIED PARTS OF DIGESTIVE TRACT: Chronic | ICD-10-CM

## 2019-02-06 DIAGNOSIS — O26.899 OTHER SPECIFIED PREGNANCY RELATED CONDITIONS, UNSPECIFIED TRIMESTER: ICD-10-CM

## 2019-02-06 DIAGNOSIS — Z3A.00 WEEKS OF GESTATION OF PREGNANCY NOT SPECIFIED: ICD-10-CM

## 2019-02-06 LAB
BASOPHILS # BLD AUTO: 0 K/UL — SIGNIFICANT CHANGE UP (ref 0–0.2)
BASOPHILS NFR BLD AUTO: 0.4 % — SIGNIFICANT CHANGE UP (ref 0–2)
BLD GP AB SCN SERPL QL: NEGATIVE — SIGNIFICANT CHANGE UP
EOSINOPHIL # BLD AUTO: 0.1 K/UL — SIGNIFICANT CHANGE UP (ref 0–0.5)
EOSINOPHIL NFR BLD AUTO: 0.4 % — SIGNIFICANT CHANGE UP (ref 0–6)
HCT VFR BLD CALC: 31.4 % — LOW (ref 34.5–45)
HGB BLD-MCNC: 11 G/DL — LOW (ref 11.5–15.5)
LYMPHOCYTES # BLD AUTO: 2.7 K/UL — SIGNIFICANT CHANGE UP (ref 1–3.3)
LYMPHOCYTES # BLD AUTO: 21.4 % — SIGNIFICANT CHANGE UP (ref 13–44)
MCHC RBC-ENTMCNC: 25.4 PG — LOW (ref 27–34)
MCHC RBC-ENTMCNC: 34.9 GM/DL — SIGNIFICANT CHANGE UP (ref 32–36)
MCV RBC AUTO: 73 FL — LOW (ref 80–100)
MONOCYTES # BLD AUTO: 0.7 K/UL — SIGNIFICANT CHANGE UP (ref 0–0.9)
MONOCYTES NFR BLD AUTO: 5.2 % — SIGNIFICANT CHANGE UP (ref 2–14)
NEUTROPHILS # BLD AUTO: 9.2 K/UL — HIGH (ref 1.8–7.4)
NEUTROPHILS NFR BLD AUTO: 72.5 % — SIGNIFICANT CHANGE UP (ref 43–77)
PLAT MORPH BLD: NORMAL — SIGNIFICANT CHANGE UP
PLATELET # BLD AUTO: 312 K/UL — SIGNIFICANT CHANGE UP (ref 150–400)
RBC # BLD: 4.3 M/UL — SIGNIFICANT CHANGE UP (ref 3.8–5.2)
RBC # FLD: 13.8 % — SIGNIFICANT CHANGE UP (ref 10.3–14.5)
RBC BLD AUTO: SIGNIFICANT CHANGE UP
RH IG SCN BLD-IMP: POSITIVE — SIGNIFICANT CHANGE UP
WBC # BLD: 12.7 K/UL — HIGH (ref 3.8–10.5)
WBC # FLD AUTO: 12.7 K/UL — HIGH (ref 3.8–10.5)

## 2019-02-06 RX ORDER — SODIUM CHLORIDE 9 MG/ML
1000 INJECTION INTRAMUSCULAR; INTRAVENOUS; SUBCUTANEOUS ONCE
Qty: 0 | Refills: 0 | Status: COMPLETED | OUTPATIENT
Start: 2019-02-06 | End: 2019-02-06

## 2019-02-06 RX ORDER — HYDROXYCHLOROQUINE SULFATE 200 MG
200 TABLET ORAL
Qty: 0 | Refills: 0 | Status: DISCONTINUED | OUTPATIENT
Start: 2019-02-06 | End: 2019-02-08

## 2019-02-06 RX ORDER — ASPIRIN/CALCIUM CARB/MAGNESIUM 324 MG
81 TABLET ORAL DAILY
Qty: 0 | Refills: 0 | Status: DISCONTINUED | OUTPATIENT
Start: 2019-02-06 | End: 2019-02-08

## 2019-02-06 RX ORDER — DOXYLAMINE SUCCINATE AND PYRIDOXINE HYDROCHLORIDE, DELAYED RELEASE TABLETS 10 MG/10 MG 10; 10 MG/1; MG/1
1 TABLET, DELAYED RELEASE ORAL AT BEDTIME
Qty: 0 | Refills: 0 | Status: DISCONTINUED | OUTPATIENT
Start: 2019-02-06 | End: 2019-02-08

## 2019-02-06 RX ORDER — VANCOMYCIN HCL 1 G
VIAL (EA) INTRAVENOUS
Qty: 0 | Refills: 0 | Status: DISCONTINUED | OUTPATIENT
Start: 2019-02-06 | End: 2019-02-08

## 2019-02-06 RX ORDER — VANCOMYCIN HCL 1 G
1000 VIAL (EA) INTRAVENOUS ONCE
Qty: 0 | Refills: 0 | Status: COMPLETED | OUTPATIENT
Start: 2019-02-06 | End: 2019-02-06

## 2019-02-06 RX ORDER — SODIUM CHLORIDE 9 MG/ML
1000 INJECTION INTRAMUSCULAR; INTRAVENOUS; SUBCUTANEOUS
Qty: 0 | Refills: 0 | Status: DISCONTINUED | OUTPATIENT
Start: 2019-02-06 | End: 2019-02-06

## 2019-02-06 RX ORDER — DOCUSATE SODIUM 100 MG
100 CAPSULE ORAL THREE TIMES A DAY
Qty: 0 | Refills: 0 | Status: DISCONTINUED | OUTPATIENT
Start: 2019-02-06 | End: 2019-02-08

## 2019-02-06 RX ORDER — ENOXAPARIN SODIUM 100 MG/ML
40 INJECTION SUBCUTANEOUS DAILY
Qty: 0 | Refills: 0 | Status: DISCONTINUED | OUTPATIENT
Start: 2019-02-06 | End: 2019-02-08

## 2019-02-06 RX ORDER — VANCOMYCIN HCL 1 G
1000 VIAL (EA) INTRAVENOUS EVERY 12 HOURS
Qty: 0 | Refills: 0 | Status: DISCONTINUED | OUTPATIENT
Start: 2019-02-07 | End: 2019-02-08

## 2019-02-06 RX ADMIN — ENOXAPARIN SODIUM 40 MILLIGRAM(S): 100 INJECTION SUBCUTANEOUS at 21:37

## 2019-02-06 RX ADMIN — Medication 81 MILLIGRAM(S): at 21:37

## 2019-02-06 RX ADMIN — Medication 100 MILLIGRAM(S): at 21:37

## 2019-02-06 RX ADMIN — Medication 250 MILLIGRAM(S): at 12:42

## 2019-02-06 RX ADMIN — SODIUM CHLORIDE 1000 MILLILITER(S): 9 INJECTION INTRAMUSCULAR; INTRAVENOUS; SUBCUTANEOUS at 12:30

## 2019-02-06 RX ADMIN — Medication 200 MILLIGRAM(S): at 21:37

## 2019-02-06 NOTE — PATIENT PROFILE OB - PAIN SCALE PREFERRED, PROFILE
PER DAD PATIENT DELELOPED A \"COUGH\" SHORTLY AFTER RECEIVING HIS 4 MONTH VACCINATIONS. STATES OVER THE LAST 3 DAYS PATIENT WITH INCREASED COUGH, \"WHEEZING\" AND A \"RUNNY NOSE. \"  NO FEVERS NOTED. PATIENT ATTENDS A .  + WET DIAPERS. none

## 2019-02-07 LAB
CULTURE RESULTS: NO GROWTH — SIGNIFICANT CHANGE UP
SPECIMEN SOURCE: SIGNIFICANT CHANGE UP

## 2019-02-07 RX ORDER — ACETAMINOPHEN 500 MG
975 TABLET ORAL EVERY 6 HOURS
Qty: 0 | Refills: 0 | Status: DISCONTINUED | OUTPATIENT
Start: 2019-02-07 | End: 2019-02-08

## 2019-02-07 RX ORDER — LIDOCAINE 4 G/100G
1 CREAM TOPICAL THREE TIMES A DAY
Qty: 0 | Refills: 0 | Status: DISCONTINUED | OUTPATIENT
Start: 2019-02-07 | End: 2019-02-08

## 2019-02-07 RX ORDER — VANCOMYCIN HCL 1 G
1 VIAL (EA) INTRAVENOUS
Qty: 100000 | Refills: 0 | OUTPATIENT
Start: 2019-02-07 | End: 2019-02-11

## 2019-02-07 RX ORDER — LEVOTHYROXINE SODIUM 125 MCG
112 TABLET ORAL DAILY
Qty: 0 | Refills: 0 | Status: DISCONTINUED | OUTPATIENT
Start: 2019-02-07 | End: 2019-02-08

## 2019-02-07 RX ORDER — ONDANSETRON 8 MG/1
4 TABLET, FILM COATED ORAL
Qty: 0 | Refills: 0 | Status: DISCONTINUED | OUTPATIENT
Start: 2019-02-07 | End: 2019-02-08

## 2019-02-07 RX ORDER — ONDANSETRON 8 MG/1
4 TABLET, FILM COATED ORAL ONCE
Qty: 0 | Refills: 0 | Status: COMPLETED | OUTPATIENT
Start: 2019-02-07 | End: 2019-02-07

## 2019-02-07 RX ADMIN — Medication 250 MILLIGRAM(S): at 00:40

## 2019-02-07 RX ADMIN — Medication 100 MILLIGRAM(S): at 14:53

## 2019-02-07 RX ADMIN — Medication 1 TABLET(S): at 22:05

## 2019-02-07 RX ADMIN — Medication 200 MILLIGRAM(S): at 09:34

## 2019-02-07 RX ADMIN — ONDANSETRON 4 MILLIGRAM(S): 8 TABLET, FILM COATED ORAL at 14:53

## 2019-02-07 RX ADMIN — Medication 1 TABLET(S): at 15:22

## 2019-02-07 RX ADMIN — ONDANSETRON 4 MILLIGRAM(S): 8 TABLET, FILM COATED ORAL at 05:21

## 2019-02-07 RX ADMIN — LIDOCAINE 1 APPLICATION(S): 4 CREAM TOPICAL at 23:21

## 2019-02-07 RX ADMIN — Medication 81 MILLIGRAM(S): at 21:32

## 2019-02-07 RX ADMIN — Medication 250 MILLIGRAM(S): at 12:43

## 2019-02-07 RX ADMIN — ENOXAPARIN SODIUM 40 MILLIGRAM(S): 100 INJECTION SUBCUTANEOUS at 21:32

## 2019-02-07 RX ADMIN — Medication 100 MILLIGRAM(S): at 22:27

## 2019-02-07 RX ADMIN — Medication 200 MILLIGRAM(S): at 21:33

## 2019-02-07 RX ADMIN — Medication 112 MICROGRAM(S): at 06:41

## 2019-02-07 RX ADMIN — LIDOCAINE 1 APPLICATION(S): 4 CREAM TOPICAL at 16:11

## 2019-02-07 RX ADMIN — Medication 100 MILLIGRAM(S): at 06:41

## 2019-02-07 RX ADMIN — Medication 1 TABLET(S): at 21:32

## 2019-02-07 RX ADMIN — Medication 1 TABLET(S): at 16:30

## 2019-02-08 ENCOUNTER — TRANSCRIPTION ENCOUNTER (OUTPATIENT)
Age: 39
End: 2019-02-08

## 2019-02-08 VITALS
SYSTOLIC BLOOD PRESSURE: 108 MMHG | TEMPERATURE: 98 F | OXYGEN SATURATION: 99 % | DIASTOLIC BLOOD PRESSURE: 74 MMHG | RESPIRATION RATE: 18 BRPM | HEART RATE: 99 BPM

## 2019-02-08 LAB — VANCOMYCIN TROUGH SERPL-MCNC: 5.1 UG/ML — LOW (ref 10–20)

## 2019-02-08 PROCEDURE — 87086 URINE CULTURE/COLONY COUNT: CPT

## 2019-02-08 PROCEDURE — 99238 HOSP IP/OBS DSCHRG MGMT 30/<: CPT

## 2019-02-08 PROCEDURE — 80202 ASSAY OF VANCOMYCIN: CPT

## 2019-02-08 PROCEDURE — 59025 FETAL NON-STRESS TEST: CPT

## 2019-02-08 PROCEDURE — G0463: CPT

## 2019-02-08 PROCEDURE — 86850 RBC ANTIBODY SCREEN: CPT

## 2019-02-08 PROCEDURE — 85027 COMPLETE CBC AUTOMATED: CPT

## 2019-02-08 PROCEDURE — C1751: CPT

## 2019-02-08 PROCEDURE — 36569 INSJ PICC 5 YR+ W/O IMAGING: CPT

## 2019-02-08 PROCEDURE — 86900 BLOOD TYPING SEROLOGIC ABO: CPT

## 2019-02-08 PROCEDURE — 86901 BLOOD TYPING SEROLOGIC RH(D): CPT

## 2019-02-08 RX ORDER — DOXYLAMINE SUCCINATE AND PYRIDOXINE HYDROCHLORIDE, DELAYED RELEASE TABLETS 10 MG/10 MG 10; 10 MG/1; MG/1
1 TABLET, DELAYED RELEASE ORAL
Qty: 0 | Refills: 0 | COMMUNITY
Start: 2019-02-08

## 2019-02-08 RX ORDER — ENOXAPARIN SODIUM 100 MG/ML
0 INJECTION SUBCUTANEOUS
Qty: 0 | Refills: 0 | COMMUNITY
Start: 2019-02-08

## 2019-02-08 RX ORDER — LEVOTHYROXINE SODIUM 125 MCG
0 TABLET ORAL
Qty: 0 | Refills: 0 | COMMUNITY

## 2019-02-08 RX ORDER — ENOXAPARIN SODIUM 100 MG/ML
40 INJECTION SUBCUTANEOUS
Qty: 0 | Refills: 0 | DISCHARGE
Start: 2019-02-08

## 2019-02-08 RX ORDER — ACETAMINOPHEN 500 MG
3 TABLET ORAL
Qty: 0 | Refills: 0 | COMMUNITY
Start: 2019-02-08

## 2019-02-08 RX ORDER — LEVOTHYROXINE SODIUM 125 MCG
1 TABLET ORAL
Qty: 0 | Refills: 0 | COMMUNITY
Start: 2019-02-08

## 2019-02-08 RX ORDER — ENOXAPARIN SODIUM 100 MG/ML
0 INJECTION SUBCUTANEOUS
Qty: 0 | Refills: 0 | COMMUNITY

## 2019-02-08 RX ORDER — ASPIRIN/CALCIUM CARB/MAGNESIUM 324 MG
1 TABLET ORAL
Qty: 0 | Refills: 0 | COMMUNITY
Start: 2019-02-08

## 2019-02-08 RX ADMIN — LIDOCAINE 1 APPLICATION(S): 4 CREAM TOPICAL at 06:39

## 2019-02-08 RX ADMIN — Medication 81 MILLIGRAM(S): at 21:18

## 2019-02-08 RX ADMIN — Medication 100 MILLIGRAM(S): at 06:38

## 2019-02-08 RX ADMIN — ENOXAPARIN SODIUM 40 MILLIGRAM(S): 100 INJECTION SUBCUTANEOUS at 21:17

## 2019-02-08 RX ADMIN — ONDANSETRON 4 MILLIGRAM(S): 8 TABLET, FILM COATED ORAL at 11:53

## 2019-02-08 RX ADMIN — Medication 112 MICROGRAM(S): at 06:38

## 2019-02-08 RX ADMIN — Medication 200 MILLIGRAM(S): at 21:18

## 2019-02-08 RX ADMIN — Medication 1 TABLET(S): at 11:51

## 2019-02-08 RX ADMIN — Medication 100 MILLIGRAM(S): at 14:59

## 2019-02-08 RX ADMIN — Medication 250 MILLIGRAM(S): at 11:51

## 2019-02-08 RX ADMIN — Medication 250 MILLIGRAM(S): at 21:53

## 2019-02-08 RX ADMIN — Medication 200 MILLIGRAM(S): at 09:11

## 2019-02-08 RX ADMIN — Medication 250 MILLIGRAM(S): at 00:33

## 2019-02-08 NOTE — DISCHARGE NOTE ANTEPARTUM - CARE PLAN
Principal Discharge DX:	Cystitis  Goal:	recovery  Assessment and plan of treatment:	Continue IV Antibiotics  Drink plenty of water

## 2019-02-08 NOTE — DISCHARGE NOTE ANTEPARTUM - HOSPITAL COURSE
Pt admitted at 23wks GA w urinary retention and dysuria.  She has had  resistant bacturia during the pregnancy.  She responded well to Carpio and IV vanco.  She received a midline eduarda home IV abx and was discharged home on IV Vancomycin.

## 2019-02-08 NOTE — DISCHARGE NOTE ANTEPARTUM - PATIENT PORTAL LINK FT
You can access the [facility] Patient Portal, offered by [facility], by registering with the following website: [email or web address]

## 2019-02-08 NOTE — DISCHARGE NOTE ANTEPARTUM - CARE PROVIDER_API CALL
Santo Caldreon)  Obstetrics and Gynecology  [address]  [place] [address]  Phone: [phone]  Fax: [phone]  Follow Up Time:

## 2019-02-08 NOTE — DISCHARGE NOTE ANTEPARTUM - MEDICATION SUMMARY - MEDICATIONS TO STOP TAKING
I will STOP taking the medications listed below when I get home from the hospital:     mg oral tablet  -- 1 tab(s) by mouth every 8 hours, As Needed  -- Do not take this drug if you are pregnant.  It is very important that you take or use this exactly as directed.  Do not skip doses or discontinue unless directed by your doctor.  May cause drowsiness or dizziness.  Obtain medical advice before taking any non-prescription drugs as some may affect the action of this medication.  Take with food or milk.    Keflex 500 mg oral capsule  -- 1 cap(s) by mouth 4 times a day   -- Finish all this medication unless otherwise directed by prescriber.

## 2019-02-08 NOTE — DISCHARGE NOTE ANTEPARTUM - MEDICATION SUMMARY - MEDICATIONS TO TAKE
I will START or STAY ON the medications listed below when I get home from the hospital:    acetaminophen 325 mg oral tablet  -- 3 tab(s) by mouth every 6 hours, As needed, Moderate Pain (4 - 6)  -- Indication: For pain    aspirin 81 mg oral delayed release tablet  -- 1 tab(s) by mouth once a day  -- Indication: For pregnancy    enoxaparin  -- Indication: For home med    doxylamine-pyridoxine 10 mg-10 mg oral delayed release tablet  -- 1 tab(s) by mouth once a day (at bedtime)  -- Indication: For nausea    metoclopramide 10 mg oral tablet  -- 1 tab(s) by mouth every 6 hours  -- It is very important that you take or use this exactly as directed.  Do not skip doses or discontinue unless directed by your doctor.  May cause drowsiness.  Alcohol may intensify this effect.  Use care when operating dangerous machinery.  Take medication on an empty stomach 1 hour before or 2 to 3 hours after a meal unless otherwise directed by your doctor.    -- Indication: For nausea    Plaquenil 200 mg oral tablet  -- orally 2 times a day  -- Indication: For lupus    vancomycin 1 g intravenous injection  -- 1 gram(s) intravenously 2 times a day   -- Indication: For urinary tract infection    Prenatal Multivitamins with Folic Acid 1 mg oral tablet  -- 1 tab(s) by mouth once a day  -- Indication: For pregnancy    levothyroxine 112 mcg (0.112 mg) oral tablet  -- 1 tab(s) by mouth once a day  -- Indication: For hypothyroidism

## 2019-02-08 NOTE — DISCHARGE NOTE ANTEPARTUM - HOME CARE AGENCY
[facility] ([facility]) [phone]

## 2019-02-08 NOTE — DISCHARGE NOTE ANTEPARTUM - SOCIAL WORKER'S NAME
[place] [facility] [place]a [place]reola [facility] [phone]

## 2019-02-11 ENCOUNTER — APPOINTMENT (OUTPATIENT)
Dept: DERMATOLOGY | Facility: CLINIC | Age: 39
End: 2019-02-11
Payer: COMMERCIAL

## 2019-02-11 ENCOUNTER — CLINICAL ADVICE (OUTPATIENT)
Age: 39
End: 2019-02-11

## 2019-02-11 PROCEDURE — 99203 OFFICE O/P NEW LOW 30 MIN: CPT

## 2019-02-12 ENCOUNTER — APPOINTMENT (OUTPATIENT)
Dept: OBGYN | Facility: CLINIC | Age: 39
End: 2019-02-12

## 2019-02-13 ENCOUNTER — CLINICAL ADVICE (OUTPATIENT)
Age: 39
End: 2019-02-13

## 2019-02-13 ENCOUNTER — OTHER (OUTPATIENT)
Age: 39
End: 2019-02-13

## 2019-02-14 ENCOUNTER — APPOINTMENT (OUTPATIENT)
Dept: ANTEPARTUM | Facility: CLINIC | Age: 39
End: 2019-02-14
Payer: COMMERCIAL

## 2019-02-14 ENCOUNTER — ASOB RESULT (OUTPATIENT)
Age: 39
End: 2019-02-14

## 2019-02-14 PROCEDURE — 76816 OB US FOLLOW-UP PER FETUS: CPT

## 2019-02-19 ENCOUNTER — APPOINTMENT (OUTPATIENT)
Dept: INTERNAL MEDICINE | Facility: CLINIC | Age: 39
End: 2019-02-19
Payer: COMMERCIAL

## 2019-02-19 ENCOUNTER — APPOINTMENT (OUTPATIENT)
Dept: OBGYN | Facility: CLINIC | Age: 39
End: 2019-02-19
Payer: COMMERCIAL

## 2019-02-19 ENCOUNTER — NON-APPOINTMENT (OUTPATIENT)
Age: 39
End: 2019-02-19

## 2019-02-19 VITALS
HEIGHT: 62 IN | SYSTOLIC BLOOD PRESSURE: 117 MMHG | BODY MASS INDEX: 29.88 KG/M2 | DIASTOLIC BLOOD PRESSURE: 79 MMHG | WEIGHT: 162.38 LBS

## 2019-02-19 VITALS
BODY MASS INDEX: 29.44 KG/M2 | HEIGHT: 62 IN | SYSTOLIC BLOOD PRESSURE: 100 MMHG | DIASTOLIC BLOOD PRESSURE: 72 MMHG | WEIGHT: 160 LBS | HEART RATE: 99 BPM | OXYGEN SATURATION: 98 %

## 2019-02-19 DIAGNOSIS — M32.9 OTHER SPECIFIED DISEASES AND CONDITIONS COMPLICATING PREGNANCY, CHILDBIRTH AND THE PUERPERIUM: ICD-10-CM

## 2019-02-19 DIAGNOSIS — K21.9 GASTRO-ESOPHAGEAL REFLUX DISEASE W/OUT ESOPHAGITIS: ICD-10-CM

## 2019-02-19 DIAGNOSIS — Z00.00 ENCOUNTER FOR GENERAL ADULT MEDICAL EXAMINATION W/OUT ABNORMAL FINDINGS: ICD-10-CM

## 2019-02-19 DIAGNOSIS — O99.89 OTHER SPECIFIED DISEASES AND CONDITIONS COMPLICATING PREGNANCY, CHILDBIRTH AND THE PUERPERIUM: ICD-10-CM

## 2019-02-19 LAB — BACTERIA UR CULT: NORMAL

## 2019-02-19 PROCEDURE — 99204 OFFICE O/P NEW MOD 45 MIN: CPT

## 2019-02-19 PROCEDURE — 0502F SUBSEQUENT PRENATAL CARE: CPT

## 2019-02-20 LAB
BASOPHILS # BLD AUTO: 0.04 K/UL
BASOPHILS NFR BLD AUTO: 0.3 %
EOSINOPHIL # BLD AUTO: 0.07 K/UL
EOSINOPHIL NFR BLD AUTO: 0.6 %
GLUCOSE 1H P 50 G GLC PO SERPL-MCNC: 175 MG/DL
HCT VFR BLD CALC: 33.2 %
HGB BLD-MCNC: 10.5 G/DL
HIV1+2 AB SPEC QL IA.RAPID: NONREACTIVE
IMM GRANULOCYTES NFR BLD AUTO: 1.8 %
LYMPHOCYTES # BLD AUTO: 2.7 K/UL
LYMPHOCYTES NFR BLD AUTO: 21.5 %
MAN DIFF?: NORMAL
MCHC RBC-ENTMCNC: 24.8 PG
MCHC RBC-ENTMCNC: 31.6 GM/DL
MCV RBC AUTO: 78.5 FL
MONOCYTES # BLD AUTO: 0.47 K/UL
MONOCYTES NFR BLD AUTO: 3.7 %
NEUTROPHILS # BLD AUTO: 9.07 K/UL
NEUTROPHILS NFR BLD AUTO: 72.1 %
PLATELET # BLD AUTO: 347 K/UL
RBC # BLD: 4.23 M/UL
RBC # FLD: 15.3 %
WBC # FLD AUTO: 12.57 K/UL

## 2019-02-21 LAB
IRON SATN MFR SERPL: 11 %
IRON SERPL-MCNC: 49 UG/DL
TIBC SERPL-MCNC: 461 UG/DL
UIBC SERPL-MCNC: 412 UG/DL

## 2019-02-22 LAB
APPEARANCE: CLEAR
BACTERIA UR CULT: NORMAL
BACTERIA: NEGATIVE
BILIRUBIN URINE: NEGATIVE
BLOOD URINE: NEGATIVE
COLOR: NORMAL
FERRITIN SERPL-MCNC: 16 NG/ML
GLUCOSE QUALITATIVE U: NEGATIVE
HYALINE CASTS: 0 /LPF
KETONES URINE: NEGATIVE
LEUKOCYTE ESTERASE URINE: NEGATIVE
MICROSCOPIC-UA: NORMAL
NITRITE URINE: NEGATIVE
PH URINE: 7
PROTEIN URINE: NEGATIVE
RED BLOOD CELLS URINE: 2 /HPF
SPECIFIC GRAVITY URINE: 1
SQUAMOUS EPITHELIAL CELLS: 2 /HPF
TSH SERPL-ACNC: 0.7 UIU/ML
UROBILINOGEN URINE: NORMAL
VIT B12 SERPL-MCNC: 488 PG/ML
WHITE BLOOD CELLS URINE: 0 /HPF

## 2019-02-26 ENCOUNTER — APPOINTMENT (OUTPATIENT)
Dept: MATERNAL FETAL MEDICINE | Facility: CLINIC | Age: 39
End: 2019-02-26
Payer: COMMERCIAL

## 2019-02-26 ENCOUNTER — APPOINTMENT (OUTPATIENT)
Dept: INTERNAL MEDICINE | Facility: CLINIC | Age: 39
End: 2019-02-26

## 2019-02-26 ENCOUNTER — ASOB RESULT (OUTPATIENT)
Age: 39
End: 2019-02-26

## 2019-02-26 LAB
GLUCOSE 1H P 100 G GLC PO SERPL-MCNC: 188 MG/DL
GLUCOSE 2H P 100 G GLC PO SERPL-MCNC: 173 MG/DL
GLUCOSE 3H P CHAL SERPL-MCNC: 96 MG/DL
GLUCOSE BS SERPL-MCNC: 91 MG/DL

## 2019-02-26 PROCEDURE — G0109 DIAB MANAGE TRN IND/GROUP: CPT

## 2019-02-28 ENCOUNTER — APPOINTMENT (OUTPATIENT)
Dept: MATERNAL FETAL MEDICINE | Facility: CLINIC | Age: 39
End: 2019-02-28
Payer: COMMERCIAL

## 2019-02-28 ENCOUNTER — ASOB RESULT (OUTPATIENT)
Age: 39
End: 2019-02-28

## 2019-02-28 ENCOUNTER — APPOINTMENT (OUTPATIENT)
Dept: ANTEPARTUM | Facility: CLINIC | Age: 39
End: 2019-02-28
Payer: COMMERCIAL

## 2019-02-28 VITALS — DIASTOLIC BLOOD PRESSURE: 60 MMHG | WEIGHT: 159 LBS | SYSTOLIC BLOOD PRESSURE: 102 MMHG | BODY MASS INDEX: 29.08 KG/M2

## 2019-02-28 PROCEDURE — 99214 OFFICE O/P EST MOD 30 MIN: CPT | Mod: 25

## 2019-03-04 PROBLEM — K21.9 ACID REFLUX: Status: ACTIVE | Noted: 2018-11-06

## 2019-03-04 PROBLEM — O99.89 SYSTEMIC LUPUS ERYTHEMATOSUS, MATERNAL, ANTEPARTUM: Status: ACTIVE | Noted: 2018-12-04

## 2019-03-04 NOTE — PHYSICAL EXAM
[No Acute Distress] : no acute distress [Well Nourished] : well nourished [Well Developed] : well developed [Well-Appearing] : well-appearing [No JVD] : no jugular venous distention [Supple] : supple [No Lymphadenopathy] : no lymphadenopathy [Thyroid Normal, No Nodules] : the thyroid was normal and there were no nodules present [No Respiratory Distress] : no respiratory distress  [Clear to Auscultation] : lungs were clear to auscultation bilaterally [No Accessory Muscle Use] : no accessory muscle use [Normal Percussion] : the chest was normal to percussion [Normal Rate] : normal rate  [Regular Rhythm] : with a regular rhythm [Normal S1, S2] : normal S1 and S2 [No Murmur] : no murmur heard [No Carotid Bruits] : no carotid bruits [No Edema] : there was no peripheral edema [No Extremity Clubbing/Cyanosis] : no extremity clubbing/cyanosis [Soft] : abdomen soft [Non Tender] : non-tender [Non-distended] : non-distended [No Masses] : no abdominal mass palpated [No HSM] : no HSM [Normal Bowel Sounds] : normal bowel sounds [Normal Supraclavicular Nodes] : no supraclavicular lymphadenopathy [Normal Posterior Cervical Nodes] : no posterior cervical lymphadenopathy [Normal Anterior Cervical Nodes] : no anterior cervical lymphadenopathy [No Joint Swelling] : no joint swelling [Grossly Normal Strength/Tone] : grossly normal strength/tone [No Rash] : no rash [No Skin Lesions] : no skin lesions [No Focal Deficits] : no focal deficits [Normal Affect] : the affect was normal [Alert and Oriented x3] : oriented to person, place, and time [Normal Mood] : the mood was normal [de-identified] : gravid uterus, bladder not distended [de-identified] : no acute synovitis

## 2019-03-04 NOTE — ASSESSMENT
[FreeTextEntry1] : 1) had flu vacc already; will be receiving TDAP in third trimester. 2) reviewed hx +ANAs, B2G abs.  Has rheumatologist who follows her and is treating her.  She is nervous because she's at point in pregnancy where she lost last one.  Continues on plaquenil.  Has none of her syx now.  3) seems clinically euthyroid - TSH has been fine in pregnancy.  4) she is asking if Fe, B12 can be checked - she is vegan  5) reflux and migraines tolerable, she says.  6) will get UA/c/s to be sure she's cleared enterococcus found when she was infected, s/p course of amoxil both PO/IV.  She has syx ?c/w bladder spasm or pelvic floor dysfunction, ?related to pregnancy.  She wonders if inability to urinate as easily may make her more prone to UTI.  Can get advise/?arrange input of urologist who does a lot of bladder/pelvic floor dysfunction to see if they can be helpful.  Also w UA will be sure no large RBC (?nephrolithiasis of pregnancy a contributor to what she's observing).  7) will remain available for any medical issues.

## 2019-03-04 NOTE — HISTORY OF PRESENT ILLNESS
[FreeTextEntry8] : Here to establish with new primary care doc and to try and help with a few issues.  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  She often feels spasm/urgency, and then doesn't feel like she completely empties.  Describes having to shift her body while sitting to allow urine to leave most completely.  Denies hematuria, dysuria, polyuria, polydypsia.  Her gestational glycemic testing has been normal.  \par \par For her migraines she's been on amitriptyline, propranolol and topamax in the past with varied success.  She is taking none of those currently for them.  Lastly she's had some skin pruritis of this pregnancy, and has only been trying sarna for it, as per her MDs.  She has had some reflux with this pregnancy that bothers her, and is treating symptomatically with mylanta.

## 2019-03-04 NOTE — REVIEW OF SYSTEMS
[Fever] : no fever [Chills] : no chills [Night Sweats] : no night sweats [Palpitations] : no palpitations [Leg Claudication] : no leg claudication [Lower Ext Edema] : no lower extremity edema [Orthopnea] : no orthopnea [Shortness Of Breath] : no shortness of breath [Wheezing] : no wheezing [Cough] : no cough [Dyspnea on Exertion] : no dyspnea on exertion [Abdominal Pain] : no abdominal pain [Nausea] : nausea [Diarrhea] : diarrhea [Vomiting] : no vomiting [Heartburn] : heartburn [Joint Pain] : no joint pain [Joint Stiffness] : no joint stiffness [Muscle Pain] : no muscle pain [Easy Bleeding] : no easy bleeding [Easy Bruising] : no easy bruising [Swollen Glands] : no swollen glands [FreeTextEntry8] : see hpi [de-identified] : has had long hx of sebaceous cysts, and tendency to have abscesses in axilla, thigh, vaginal labia

## 2019-03-05 ENCOUNTER — OUTPATIENT (OUTPATIENT)
Dept: OUTPATIENT SERVICES | Facility: HOSPITAL | Age: 39
LOS: 1 days | End: 2019-03-05
Payer: COMMERCIAL

## 2019-03-05 ENCOUNTER — APPOINTMENT (OUTPATIENT)
Dept: OBGYN | Facility: CLINIC | Age: 39
End: 2019-03-05
Payer: COMMERCIAL

## 2019-03-05 ENCOUNTER — NON-APPOINTMENT (OUTPATIENT)
Age: 39
End: 2019-03-05

## 2019-03-05 VITALS
WEIGHT: 161 LBS | HEIGHT: 62 IN | DIASTOLIC BLOOD PRESSURE: 78 MMHG | BODY MASS INDEX: 29.63 KG/M2 | SYSTOLIC BLOOD PRESSURE: 118 MMHG

## 2019-03-05 DIAGNOSIS — Z90.49 ACQUIRED ABSENCE OF OTHER SPECIFIED PARTS OF DIGESTIVE TRACT: Chronic | ICD-10-CM

## 2019-03-05 DIAGNOSIS — Z3A.00 WEEKS OF GESTATION OF PREGNANCY NOT SPECIFIED: ICD-10-CM

## 2019-03-05 DIAGNOSIS — O26.899 OTHER SPECIFIED PREGNANCY RELATED CONDITIONS, UNSPECIFIED TRIMESTER: ICD-10-CM

## 2019-03-05 DIAGNOSIS — Z90.722 ACQUIRED ABSENCE OF OVARIES, BILATERAL: Chronic | ICD-10-CM

## 2019-03-05 DIAGNOSIS — Z98.890 OTHER SPECIFIED POSTPROCEDURAL STATES: Chronic | ICD-10-CM

## 2019-03-05 PROCEDURE — 59025 FETAL NON-STRESS TEST: CPT | Mod: 26

## 2019-03-05 PROCEDURE — 0502F SUBSEQUENT PRENATAL CARE: CPT

## 2019-03-06 ENCOUNTER — RX RENEWAL (OUTPATIENT)
Age: 39
End: 2019-03-06

## 2019-03-06 ENCOUNTER — APPOINTMENT (OUTPATIENT)
Dept: UROLOGY | Facility: CLINIC | Age: 39
End: 2019-03-06
Payer: COMMERCIAL

## 2019-03-06 ENCOUNTER — APPOINTMENT (OUTPATIENT)
Dept: MATERNAL FETAL MEDICINE | Facility: CLINIC | Age: 39
End: 2019-03-06

## 2019-03-06 ENCOUNTER — APPOINTMENT (OUTPATIENT)
Dept: MATERNAL FETAL MEDICINE | Facility: CLINIC | Age: 39
End: 2019-03-06
Payer: COMMERCIAL

## 2019-03-06 ENCOUNTER — ASOB RESULT (OUTPATIENT)
Age: 39
End: 2019-03-06

## 2019-03-06 VITALS
HEART RATE: 111 BPM | WEIGHT: 160 LBS | RESPIRATION RATE: 20 BRPM | SYSTOLIC BLOOD PRESSURE: 111 MMHG | DIASTOLIC BLOOD PRESSURE: 74 MMHG | BODY MASS INDEX: 29.44 KG/M2 | HEIGHT: 62 IN

## 2019-03-06 DIAGNOSIS — Z86.32 PERSONAL HISTORY OF GESTATIONAL DIABETES: ICD-10-CM

## 2019-03-06 LAB
APPEARANCE UR: CLEAR — SIGNIFICANT CHANGE UP
BILIRUB UR-MCNC: NEGATIVE — SIGNIFICANT CHANGE UP
COLOR SPEC: SIGNIFICANT CHANGE UP
CULTURE RESULTS: SIGNIFICANT CHANGE UP
DIFF PNL FLD: NEGATIVE — SIGNIFICANT CHANGE UP
GLUCOSE UR QL: NEGATIVE — SIGNIFICANT CHANGE UP
KETONES UR-MCNC: NEGATIVE — SIGNIFICANT CHANGE UP
LEUKOCYTE ESTERASE UR-ACNC: NEGATIVE — SIGNIFICANT CHANGE UP
NITRITE UR-MCNC: NEGATIVE — SIGNIFICANT CHANGE UP
PH UR: 7 — SIGNIFICANT CHANGE UP (ref 5–8)
PROT UR-MCNC: NEGATIVE — SIGNIFICANT CHANGE UP
SP GR SPEC: 1.01 — LOW (ref 1.01–1.02)
SPECIMEN SOURCE: SIGNIFICANT CHANGE UP
UROBILINOGEN FLD QL: NEGATIVE — SIGNIFICANT CHANGE UP

## 2019-03-06 PROCEDURE — G0108 DIAB MANAGE TRN  PER INDIV: CPT

## 2019-03-06 PROCEDURE — G0463: CPT

## 2019-03-06 PROCEDURE — 51798 US URINE CAPACITY MEASURE: CPT

## 2019-03-06 PROCEDURE — 87086 URINE CULTURE/COLONY COUNT: CPT

## 2019-03-06 PROCEDURE — 99205 OFFICE O/P NEW HI 60 MIN: CPT | Mod: 25

## 2019-03-06 PROCEDURE — 59025 FETAL NON-STRESS TEST: CPT

## 2019-03-06 PROCEDURE — 81003 URINALYSIS AUTO W/O SCOPE: CPT

## 2019-03-06 NOTE — PHYSICAL EXAM
[General Appearance - Well Nourished] : well nourished [Normal Appearance] : normal appearance [Edema] : no peripheral edema [Respiration, Rhythm And Depth] : normal respiratory rhythm and effort [Abdomen Soft] : soft [Normal Station and Gait] : the gait and station were normal for the patient's age [Skin Color & Pigmentation] : normal skin color and pigmentation [No Focal Deficits] : no focal deficits [Oriented To Time, Place, And Person] : oriented to person, place, and time [No Palpable Adenopathy] : no palpable adenopathy

## 2019-03-07 ENCOUNTER — APPOINTMENT (OUTPATIENT)
Dept: UROLOGY | Facility: CLINIC | Age: 39
End: 2019-03-07

## 2019-03-07 NOTE — ASSESSMENT
[FreeTextEntry1] : 37 yo F @ 27 weeks who presents in consultation for possible urinary leakage, difficulting voiding, suspect PFD.\par \par PFM relaxation techniques rev'd in detail:\par -No pushing, straining, bearing down\par -Avoid constipation\par -Flax seed oil capsules; 2-3 capsules 2-3x/day (titrate as tolerated)\par -No Kegels\par -Increase water intake/do not withhold fluids\par \par \par RTO PRN weeks or sooner if needed\par

## 2019-03-14 ENCOUNTER — ASOB RESULT (OUTPATIENT)
Age: 39
End: 2019-03-14

## 2019-03-14 ENCOUNTER — APPOINTMENT (OUTPATIENT)
Dept: ANTEPARTUM | Facility: CLINIC | Age: 39
End: 2019-03-14
Payer: COMMERCIAL

## 2019-03-14 ENCOUNTER — APPOINTMENT (OUTPATIENT)
Dept: MATERNAL FETAL MEDICINE | Facility: CLINIC | Age: 39
End: 2019-03-14
Payer: COMMERCIAL

## 2019-03-14 PROCEDURE — 76816 OB US FOLLOW-UP PER FETUS: CPT

## 2019-03-14 PROCEDURE — G0108 DIAB MANAGE TRN  PER INDIV: CPT

## 2019-03-15 LAB
APPEARANCE: CLEAR
BACTERIA UR CULT: NORMAL
BACTERIA: NEGATIVE
BILIRUBIN URINE: NEGATIVE
BLOOD URINE: NEGATIVE
COLOR: NORMAL
GLUCOSE QUALITATIVE U: NEGATIVE
HYALINE CASTS: 0 /LPF
KETONES URINE: NEGATIVE
LEUKOCYTE ESTERASE URINE: NEGATIVE
MICROSCOPIC-UA: NORMAL
NITRITE URINE: NEGATIVE
PH URINE: 6.5
PROTEIN URINE: NORMAL
RED BLOOD CELLS URINE: 1 /HPF
SPECIFIC GRAVITY URINE: 1.01
SQUAMOUS EPITHELIAL CELLS: 6 /HPF
UROBILINOGEN URINE: NORMAL
WHITE BLOOD CELLS URINE: 20 /HPF

## 2019-03-18 ENCOUNTER — NON-APPOINTMENT (OUTPATIENT)
Age: 39
End: 2019-03-18

## 2019-03-18 ENCOUNTER — APPOINTMENT (OUTPATIENT)
Dept: OBGYN | Facility: CLINIC | Age: 39
End: 2019-03-18
Payer: COMMERCIAL

## 2019-03-18 VITALS
BODY MASS INDEX: 30 KG/M2 | WEIGHT: 163 LBS | DIASTOLIC BLOOD PRESSURE: 70 MMHG | HEIGHT: 62 IN | SYSTOLIC BLOOD PRESSURE: 118 MMHG

## 2019-03-18 PROCEDURE — 0502F SUBSEQUENT PRENATAL CARE: CPT

## 2019-03-25 ENCOUNTER — MESSAGE (OUTPATIENT)
Age: 39
End: 2019-03-25

## 2019-03-25 ENCOUNTER — NON-APPOINTMENT (OUTPATIENT)
Age: 39
End: 2019-03-25

## 2019-03-25 ENCOUNTER — APPOINTMENT (OUTPATIENT)
Dept: OBGYN | Facility: CLINIC | Age: 39
End: 2019-03-25
Payer: COMMERCIAL

## 2019-03-25 VITALS — WEIGHT: 165 LBS | BODY MASS INDEX: 30.18 KG/M2 | DIASTOLIC BLOOD PRESSURE: 40 MMHG | SYSTOLIC BLOOD PRESSURE: 82 MMHG

## 2019-03-25 LAB
BASOPHILS # BLD AUTO: 0.04 K/UL
BASOPHILS NFR BLD AUTO: 0.3 %
EOSINOPHIL # BLD AUTO: 0.04 K/UL
EOSINOPHIL NFR BLD AUTO: 0.3 %
HCT VFR BLD CALC: 32.6 %
HGB BLD-MCNC: 10.4 G/DL
IMM GRANULOCYTES NFR BLD AUTO: 1.8 %
LYMPHOCYTES # BLD AUTO: 2.68 K/UL
LYMPHOCYTES NFR BLD AUTO: 20.1 %
MAN DIFF?: NORMAL
MCHC RBC-ENTMCNC: 24 PG
MCHC RBC-ENTMCNC: 31.9 GM/DL
MCV RBC AUTO: 75.1 FL
MONOCYTES # BLD AUTO: 0.69 K/UL
MONOCYTES NFR BLD AUTO: 5.2 %
NEUTROPHILS # BLD AUTO: 9.65 K/UL
NEUTROPHILS NFR BLD AUTO: 72.3 %
PLATELET # BLD AUTO: 377 K/UL
RBC # BLD: 4.34 M/UL
RBC # FLD: 14.7 %
TSH SERPL-ACNC: 1.71 UIU/ML
WBC # FLD AUTO: 13.34 K/UL

## 2019-03-25 PROCEDURE — 0502F SUBSEQUENT PRENATAL CARE: CPT

## 2019-03-27 ENCOUNTER — RX RENEWAL (OUTPATIENT)
Age: 39
End: 2019-03-27

## 2019-03-27 ENCOUNTER — OUTPATIENT (OUTPATIENT)
Dept: OUTPATIENT SERVICES | Facility: HOSPITAL | Age: 39
LOS: 1 days | End: 2019-03-27
Payer: COMMERCIAL

## 2019-03-27 ENCOUNTER — NON-APPOINTMENT (OUTPATIENT)
Age: 39
End: 2019-03-27

## 2019-03-27 DIAGNOSIS — Z98.890 OTHER SPECIFIED POSTPROCEDURAL STATES: Chronic | ICD-10-CM

## 2019-03-27 DIAGNOSIS — Z90.49 ACQUIRED ABSENCE OF OTHER SPECIFIED PARTS OF DIGESTIVE TRACT: Chronic | ICD-10-CM

## 2019-03-27 DIAGNOSIS — Z3A.00 WEEKS OF GESTATION OF PREGNANCY NOT SPECIFIED: ICD-10-CM

## 2019-03-27 DIAGNOSIS — O26.899 OTHER SPECIFIED PREGNANCY RELATED CONDITIONS, UNSPECIFIED TRIMESTER: ICD-10-CM

## 2019-03-27 DIAGNOSIS — Z90.722 ACQUIRED ABSENCE OF OVARIES, BILATERAL: Chronic | ICD-10-CM

## 2019-03-27 PROCEDURE — 59025 FETAL NON-STRESS TEST: CPT | Mod: 26

## 2019-03-28 LAB
APPEARANCE UR: CLEAR — SIGNIFICANT CHANGE UP
BILIRUB UR-MCNC: NEGATIVE — SIGNIFICANT CHANGE UP
COLOR SPEC: COLORLESS — SIGNIFICANT CHANGE UP
CULTURE RESULTS: SIGNIFICANT CHANGE UP
DIFF PNL FLD: NEGATIVE — SIGNIFICANT CHANGE UP
GLUCOSE UR QL: NEGATIVE — SIGNIFICANT CHANGE UP
KETONES UR-MCNC: NEGATIVE — SIGNIFICANT CHANGE UP
LEUKOCYTE ESTERASE UR-ACNC: NEGATIVE — SIGNIFICANT CHANGE UP
NITRITE UR-MCNC: NEGATIVE — SIGNIFICANT CHANGE UP
PH UR: 6.5 — SIGNIFICANT CHANGE UP (ref 5–8)
PROT UR-MCNC: NEGATIVE — SIGNIFICANT CHANGE UP
SP GR SPEC: 1.01 — LOW (ref 1.01–1.02)
SPECIMEN SOURCE: SIGNIFICANT CHANGE UP
UROBILINOGEN FLD QL: NEGATIVE — SIGNIFICANT CHANGE UP

## 2019-03-28 PROCEDURE — 81003 URINALYSIS AUTO W/O SCOPE: CPT

## 2019-03-28 PROCEDURE — 87086 URINE CULTURE/COLONY COUNT: CPT

## 2019-03-28 PROCEDURE — G0463: CPT

## 2019-03-28 PROCEDURE — 59025 FETAL NON-STRESS TEST: CPT

## 2019-03-29 ENCOUNTER — APPOINTMENT (OUTPATIENT)
Dept: MATERNAL FETAL MEDICINE | Facility: CLINIC | Age: 39
End: 2019-03-29

## 2019-03-30 ENCOUNTER — INPATIENT (INPATIENT)
Facility: HOSPITAL | Age: 39
LOS: 2 days | Discharge: HOME | DRG: 833 | End: 2019-04-02
Attending: OBSTETRICS & GYNECOLOGY | Admitting: OBSTETRICS & GYNECOLOGY
Payer: COMMERCIAL

## 2019-03-30 VITALS
DIASTOLIC BLOOD PRESSURE: 64 MMHG | RESPIRATION RATE: 18 BRPM | OXYGEN SATURATION: 99 % | TEMPERATURE: 99 F | HEART RATE: 100 BPM | SYSTOLIC BLOOD PRESSURE: 99 MMHG

## 2019-03-30 DIAGNOSIS — Z3A.00 WEEKS OF GESTATION OF PREGNANCY NOT SPECIFIED: ICD-10-CM

## 2019-03-30 DIAGNOSIS — Z98.890 OTHER SPECIFIED POSTPROCEDURAL STATES: Chronic | ICD-10-CM

## 2019-03-30 DIAGNOSIS — Z90.722 ACQUIRED ABSENCE OF OVARIES, BILATERAL: Chronic | ICD-10-CM

## 2019-03-30 DIAGNOSIS — Z34.80 ENCOUNTER FOR SUPERVISION OF OTHER NORMAL PREGNANCY, UNSPECIFIED TRIMESTER: ICD-10-CM

## 2019-03-30 DIAGNOSIS — O26.899 OTHER SPECIFIED PREGNANCY RELATED CONDITIONS, UNSPECIFIED TRIMESTER: ICD-10-CM

## 2019-03-30 DIAGNOSIS — Z90.49 ACQUIRED ABSENCE OF OTHER SPECIFIED PARTS OF DIGESTIVE TRACT: Chronic | ICD-10-CM

## 2019-03-30 LAB
ALBUMIN SERPL ELPH-MCNC: 3.4 G/DL — SIGNIFICANT CHANGE UP (ref 3.3–5)
ALP SERPL-CCNC: 128 U/L — HIGH (ref 40–120)
ALT FLD-CCNC: 5 U/L — LOW (ref 10–45)
ANION GAP SERPL CALC-SCNC: 13 MMOL/L — SIGNIFICANT CHANGE UP (ref 5–17)
APTT BLD: 29.8 SEC — SIGNIFICANT CHANGE UP (ref 27.5–36.3)
AST SERPL-CCNC: 10 U/L — SIGNIFICANT CHANGE UP (ref 10–40)
BASOPHILS # BLD AUTO: 0 K/UL — SIGNIFICANT CHANGE UP (ref 0–0.2)
BILIRUB SERPL-MCNC: 0.1 MG/DL — LOW (ref 0.2–1.2)
BLD GP AB SCN SERPL QL: NEGATIVE — SIGNIFICANT CHANGE UP
BUN SERPL-MCNC: 6 MG/DL — LOW (ref 7–23)
CALCIUM SERPL-MCNC: 7.7 MG/DL — LOW (ref 8.4–10.5)
CHLORIDE SERPL-SCNC: 106 MMOL/L — SIGNIFICANT CHANGE UP (ref 96–108)
CO2 SERPL-SCNC: 19 MMOL/L — LOW (ref 22–31)
CREAT SERPL-MCNC: 0.3 MG/DL — LOW (ref 0.5–1.3)
EOSINOPHIL # BLD AUTO: 0.1 K/UL — SIGNIFICANT CHANGE UP (ref 0–0.5)
EOSINOPHIL NFR BLD AUTO: 1 % — SIGNIFICANT CHANGE UP (ref 0–6)
FIBRINOGEN PPP-MCNC: 844 MG/DL — SIGNIFICANT CHANGE UP (ref 350–510)
GLUCOSE BLDC GLUCOMTR-MCNC: 127 MG/DL — HIGH (ref 70–99)
GLUCOSE BLDC GLUCOMTR-MCNC: 128 MG/DL — HIGH (ref 70–99)
GLUCOSE BLDC GLUCOMTR-MCNC: 136 MG/DL — HIGH (ref 70–99)
GLUCOSE BLDC GLUCOMTR-MCNC: 159 MG/DL — HIGH (ref 70–99)
GLUCOSE SERPL-MCNC: 147 MG/DL — HIGH (ref 70–99)
HCT VFR BLD CALC: 30.1 % — LOW (ref 34.5–45)
HGB BLD-MCNC: 10.4 G/DL — LOW (ref 11.5–15.5)
INR BLD: 0.98 RATIO — SIGNIFICANT CHANGE UP (ref 0.88–1.16)
LYMPHOCYTES # BLD AUTO: 28 % — SIGNIFICANT CHANGE UP (ref 13–44)
LYMPHOCYTES # BLD AUTO: 3.9 K/UL — HIGH (ref 1–3.3)
MAGNESIUM SERPL-MCNC: 5 MG/DL — HIGH (ref 1.6–2.6)
MAGNESIUM SERPL-MCNC: 5.4 MG/DL — HIGH (ref 1.6–2.6)
MCHC RBC-ENTMCNC: 24.6 PG — LOW (ref 27–34)
MCHC RBC-ENTMCNC: 34.4 GM/DL — SIGNIFICANT CHANGE UP (ref 32–36)
MCV RBC AUTO: 71.6 FL — LOW (ref 80–100)
MONOCYTES # BLD AUTO: 0.9 K/UL — SIGNIFICANT CHANGE UP (ref 0–0.9)
MONOCYTES NFR BLD AUTO: 6 % — SIGNIFICANT CHANGE UP (ref 2–14)
NEUTROPHILS # BLD AUTO: 11.1 K/UL — HIGH (ref 1.8–7.4)
NEUTROPHILS NFR BLD AUTO: 65 % — SIGNIFICANT CHANGE UP (ref 43–77)
PLATELET # BLD AUTO: 350 K/UL — SIGNIFICANT CHANGE UP (ref 150–400)
POTASSIUM SERPL-MCNC: 4.1 MMOL/L — SIGNIFICANT CHANGE UP (ref 3.5–5.3)
POTASSIUM SERPL-SCNC: 4.1 MMOL/L — SIGNIFICANT CHANGE UP (ref 3.5–5.3)
PROT SERPL-MCNC: 6.4 G/DL — SIGNIFICANT CHANGE UP (ref 6–8.3)
PROTHROM AB SERPL-ACNC: 11.2 SEC — SIGNIFICANT CHANGE UP (ref 10–12.9)
RBC # BLD: 4.21 M/UL — SIGNIFICANT CHANGE UP (ref 3.8–5.2)
RBC # FLD: 13.8 % — SIGNIFICANT CHANGE UP (ref 10.3–14.5)
RH IG SCN BLD-IMP: POSITIVE — SIGNIFICANT CHANGE UP
SODIUM SERPL-SCNC: 138 MMOL/L — SIGNIFICANT CHANGE UP (ref 135–145)
WBC # BLD: 16 K/UL — HIGH (ref 3.8–10.5)
WBC # FLD AUTO: 16 K/UL — HIGH (ref 3.8–10.5)

## 2019-03-30 RX ORDER — FAMOTIDINE 10 MG/ML
20 INJECTION INTRAVENOUS ONCE
Qty: 0 | Refills: 0 | Status: DISCONTINUED | OUTPATIENT
Start: 2019-03-30 | End: 2019-04-02

## 2019-03-30 RX ORDER — AMPICILLIN TRIHYDRATE 250 MG
2 CAPSULE ORAL ONCE
Qty: 0 | Refills: 0 | Status: COMPLETED | OUTPATIENT
Start: 2019-03-30 | End: 2019-03-30

## 2019-03-30 RX ORDER — AMPICILLIN TRIHYDRATE 250 MG
CAPSULE ORAL
Qty: 0 | Refills: 0 | Status: DISCONTINUED | OUTPATIENT
Start: 2019-03-30 | End: 2019-03-30

## 2019-03-30 RX ORDER — GLUCAGON INJECTION, SOLUTION 0.5 MG/.1ML
1 INJECTION, SOLUTION SUBCUTANEOUS ONCE
Qty: 0 | Refills: 0 | Status: DISCONTINUED | OUTPATIENT
Start: 2019-03-30 | End: 2019-04-02

## 2019-03-30 RX ORDER — DEXTROSE 50 % IN WATER 50 %
12.5 SYRINGE (ML) INTRAVENOUS ONCE
Qty: 0 | Refills: 0 | Status: DISCONTINUED | OUTPATIENT
Start: 2019-03-30 | End: 2019-04-02

## 2019-03-30 RX ORDER — ACETAMINOPHEN 500 MG
975 TABLET ORAL ONCE
Qty: 0 | Refills: 0 | Status: COMPLETED | OUTPATIENT
Start: 2019-03-30 | End: 2019-03-30

## 2019-03-30 RX ORDER — DEXTROSE 50 % IN WATER 50 %
25 SYRINGE (ML) INTRAVENOUS ONCE
Qty: 0 | Refills: 0 | Status: DISCONTINUED | OUTPATIENT
Start: 2019-03-30 | End: 2019-04-02

## 2019-03-30 RX ORDER — HYDROXYCHLOROQUINE SULFATE 200 MG
200 TABLET ORAL
Qty: 0 | Refills: 0 | Status: DISCONTINUED | OUTPATIENT
Start: 2019-03-30 | End: 2019-04-02

## 2019-03-30 RX ORDER — ASPIRIN/CALCIUM CARB/MAGNESIUM 324 MG
81 TABLET ORAL
Qty: 0 | Refills: 0 | Status: DISCONTINUED | OUTPATIENT
Start: 2019-03-30 | End: 2019-04-02

## 2019-03-30 RX ORDER — DEXTROSE 50 % IN WATER 50 %
15 SYRINGE (ML) INTRAVENOUS ONCE
Qty: 0 | Refills: 0 | Status: DISCONTINUED | OUTPATIENT
Start: 2019-03-30 | End: 2019-04-02

## 2019-03-30 RX ORDER — SODIUM CHLORIDE 9 MG/ML
1000 INJECTION, SOLUTION INTRAVENOUS
Qty: 0 | Refills: 0 | Status: DISCONTINUED | OUTPATIENT
Start: 2019-03-30 | End: 2019-03-30

## 2019-03-30 RX ORDER — FOLIC ACID 0.8 MG
1 TABLET ORAL DAILY
Qty: 0 | Refills: 0 | Status: DISCONTINUED | OUTPATIENT
Start: 2019-03-30 | End: 2019-04-02

## 2019-03-30 RX ORDER — MAGNESIUM SULFATE 500 MG/ML
4 VIAL (ML) INJECTION ONCE
Qty: 0 | Refills: 0 | Status: COMPLETED | OUTPATIENT
Start: 2019-03-30 | End: 2019-03-30

## 2019-03-30 RX ORDER — SODIUM CHLORIDE 9 MG/ML
3 INJECTION INTRAMUSCULAR; INTRAVENOUS; SUBCUTANEOUS EVERY 8 HOURS
Qty: 0 | Refills: 0 | Status: DISCONTINUED | OUTPATIENT
Start: 2019-03-30 | End: 2019-04-02

## 2019-03-30 RX ORDER — ASPIRIN/CALCIUM CARB/MAGNESIUM 324 MG
162 TABLET ORAL
Qty: 0 | Refills: 0 | Status: DISCONTINUED | OUTPATIENT
Start: 2019-03-30 | End: 2019-04-02

## 2019-03-30 RX ORDER — LEVOTHYROXINE SODIUM 125 MCG
112 TABLET ORAL DAILY
Qty: 0 | Refills: 0 | Status: DISCONTINUED | OUTPATIENT
Start: 2019-03-30 | End: 2019-04-02

## 2019-03-30 RX ORDER — SODIUM CHLORIDE 9 MG/ML
1000 INJECTION, SOLUTION INTRAVENOUS
Qty: 0 | Refills: 0 | Status: DISCONTINUED | OUTPATIENT
Start: 2019-03-30 | End: 2019-04-02

## 2019-03-30 RX ORDER — HUMAN INSULIN 100 [IU]/ML
10 INJECTION, SUSPENSION SUBCUTANEOUS AT BEDTIME
Qty: 0 | Refills: 0 | Status: DISCONTINUED | OUTPATIENT
Start: 2019-03-30 | End: 2019-04-02

## 2019-03-30 RX ORDER — AMPICILLIN TRIHYDRATE 250 MG
1 CAPSULE ORAL EVERY 4 HOURS
Qty: 0 | Refills: 0 | Status: DISCONTINUED | OUTPATIENT
Start: 2019-03-30 | End: 2019-03-30

## 2019-03-30 RX ORDER — MAGNESIUM SULFATE 500 MG/ML
2 VIAL (ML) INJECTION
Qty: 40 | Refills: 0 | Status: DISCONTINUED | OUTPATIENT
Start: 2019-03-30 | End: 2019-04-01

## 2019-03-30 RX ORDER — HEPARIN SODIUM 5000 [USP'U]/ML
5000 INJECTION INTRAVENOUS; SUBCUTANEOUS EVERY 12 HOURS
Qty: 0 | Refills: 0 | Status: DISCONTINUED | OUTPATIENT
Start: 2019-03-30 | End: 2019-03-31

## 2019-03-30 RX ORDER — SODIUM CHLORIDE 9 MG/ML
1000 INJECTION INTRAMUSCULAR; INTRAVENOUS; SUBCUTANEOUS
Qty: 0 | Refills: 0 | Status: DISCONTINUED | OUTPATIENT
Start: 2019-03-30 | End: 2019-04-02

## 2019-03-30 RX ADMIN — Medication 112 MICROGRAM(S): at 09:00

## 2019-03-30 RX ADMIN — Medication 108 GRAM(S): at 09:03

## 2019-03-30 RX ADMIN — Medication 50 GM/HR: at 04:47

## 2019-03-30 RX ADMIN — HUMAN INSULIN 10 UNIT(S): 100 INJECTION, SUSPENSION SUBCUTANEOUS at 23:12

## 2019-03-30 RX ADMIN — Medication 12 MILLIGRAM(S): at 04:15

## 2019-03-30 RX ADMIN — Medication 200 MILLIGRAM(S): at 09:37

## 2019-03-30 RX ADMIN — Medication 108 GRAM(S): at 12:59

## 2019-03-30 RX ADMIN — SODIUM CHLORIDE 3 MILLILITER(S): 9 INJECTION INTRAMUSCULAR; INTRAVENOUS; SUBCUTANEOUS at 16:40

## 2019-03-30 RX ADMIN — Medication 975 MILLIGRAM(S): at 14:53

## 2019-03-30 RX ADMIN — HEPARIN SODIUM 5000 UNIT(S): 5000 INJECTION INTRAVENOUS; SUBCUTANEOUS at 23:20

## 2019-03-30 RX ADMIN — Medication 216 GRAM(S): at 04:58

## 2019-03-30 RX ADMIN — SODIUM CHLORIDE 3 MILLILITER(S): 9 INJECTION INTRAMUSCULAR; INTRAVENOUS; SUBCUTANEOUS at 22:00

## 2019-03-30 RX ADMIN — Medication 1 TABLET(S): at 23:13

## 2019-03-30 RX ADMIN — Medication 300 GRAM(S): at 04:21

## 2019-03-30 RX ADMIN — Medication 200 MILLIGRAM(S): at 23:13

## 2019-03-30 RX ADMIN — Medication 975 MILLIGRAM(S): at 13:08

## 2019-03-30 RX ADMIN — Medication 1 MILLIGRAM(S): at 12:02

## 2019-03-31 LAB
GLUCOSE BLDC GLUCOMTR-MCNC: 138 MG/DL — HIGH (ref 70–99)
GLUCOSE BLDC GLUCOMTR-MCNC: 141 MG/DL — HIGH (ref 70–99)
GLUCOSE BLDC GLUCOMTR-MCNC: 142 MG/DL — HIGH (ref 70–99)
GLUCOSE BLDC GLUCOMTR-MCNC: 165 MG/DL — HIGH (ref 70–99)
MAGNESIUM SERPL-MCNC: 4.8 MG/DL — HIGH (ref 1.6–2.6)
MAGNESIUM SERPL-MCNC: 4.9 MG/DL — HIGH (ref 1.6–2.6)
T PALLIDUM AB TITR SER: NEGATIVE — SIGNIFICANT CHANGE UP

## 2019-03-31 RX ORDER — ONDANSETRON 8 MG/1
4 TABLET, FILM COATED ORAL ONCE
Qty: 0 | Refills: 0 | Status: COMPLETED | OUTPATIENT
Start: 2019-03-31 | End: 2019-03-31

## 2019-03-31 RX ORDER — INSULIN LISPRO 100/ML
2 VIAL (ML) SUBCUTANEOUS ONCE
Qty: 0 | Refills: 0 | Status: COMPLETED | OUTPATIENT
Start: 2019-03-31 | End: 2019-03-31

## 2019-03-31 RX ORDER — ENOXAPARIN SODIUM 100 MG/ML
40 INJECTION SUBCUTANEOUS DAILY
Qty: 0 | Refills: 0 | Status: DISCONTINUED | OUTPATIENT
Start: 2019-03-31 | End: 2019-04-02

## 2019-03-31 RX ORDER — FAMOTIDINE 10 MG/ML
20 INJECTION INTRAVENOUS
Qty: 0 | Refills: 0 | Status: DISCONTINUED | OUTPATIENT
Start: 2019-03-31 | End: 2019-04-02

## 2019-03-31 RX ORDER — MAGNESIUM SULFATE 500 MG/ML
2 VIAL (ML) INJECTION
Qty: 40 | Refills: 0 | Status: DISCONTINUED | OUTPATIENT
Start: 2019-03-31 | End: 2019-04-01

## 2019-03-31 RX ADMIN — Medication 200 MILLIGRAM(S): at 23:01

## 2019-03-31 RX ADMIN — Medication 1 TABLET(S): at 14:40

## 2019-03-31 RX ADMIN — Medication 112 MICROGRAM(S): at 06:51

## 2019-03-31 RX ADMIN — SODIUM CHLORIDE 3 MILLILITER(S): 9 INJECTION INTRAMUSCULAR; INTRAVENOUS; SUBCUTANEOUS at 12:34

## 2019-03-31 RX ADMIN — SODIUM CHLORIDE 3 MILLILITER(S): 9 INJECTION INTRAMUSCULAR; INTRAVENOUS; SUBCUTANEOUS at 23:02

## 2019-03-31 RX ADMIN — Medication 1 TABLET(S): at 17:27

## 2019-03-31 RX ADMIN — SODIUM CHLORIDE 3 MILLILITER(S): 9 INJECTION INTRAMUSCULAR; INTRAVENOUS; SUBCUTANEOUS at 07:06

## 2019-03-31 RX ADMIN — FAMOTIDINE 20 MILLIGRAM(S): 10 INJECTION INTRAVENOUS at 20:21

## 2019-03-31 RX ADMIN — HUMAN INSULIN 10 UNIT(S): 100 INJECTION, SUSPENSION SUBCUTANEOUS at 23:01

## 2019-03-31 RX ADMIN — ONDANSETRON 4 MILLIGRAM(S): 8 TABLET, FILM COATED ORAL at 21:39

## 2019-03-31 RX ADMIN — Medication 1 MILLIGRAM(S): at 12:48

## 2019-03-31 RX ADMIN — Medication 50 GM/HR: at 10:04

## 2019-03-31 RX ADMIN — ENOXAPARIN SODIUM 40 MILLIGRAM(S): 100 INJECTION SUBCUTANEOUS at 12:49

## 2019-03-31 RX ADMIN — Medication 12 MILLIGRAM(S): at 04:12

## 2019-03-31 RX ADMIN — Medication 200 MILLIGRAM(S): at 10:58

## 2019-03-31 RX ADMIN — Medication 1 TABLET(S): at 20:21

## 2019-03-31 RX ADMIN — SODIUM CHLORIDE 50 MILLILITER(S): 9 INJECTION INTRAMUSCULAR; INTRAVENOUS; SUBCUTANEOUS at 10:09

## 2019-03-31 RX ADMIN — Medication 2 UNIT(S): at 09:40

## 2019-03-31 RX ADMIN — Medication 1 TABLET(S): at 21:30

## 2019-03-31 RX ADMIN — Medication 1 TABLET(S): at 00:00

## 2019-03-31 RX ADMIN — Medication 81 MILLIGRAM(S): at 23:01

## 2019-03-31 NOTE — DIETITIAN INITIAL EVALUATION ADULT. - NS AS NUTRI INTERV MEALS SNACK
1. Continue gestational diabetic diet with 3 snacks as ordered, 2. Review nutrition education as needed, 3. Monitor PO intake, diet tolerance, weight trends, labs, 4. RD to remain available as needed

## 2019-03-31 NOTE — DIETITIAN INITIAL EVALUATION ADULT. - ORAL INTAKE PTA
Pt reports eating well with good appetite, pt consumes 3 meals per day and 3 snacks consistent with GDM meal pattern consisting of a variety of healthful foods/good

## 2019-03-31 NOTE — DIETITIAN INITIAL EVALUATION ADULT. - ENERGY NEEDS
Pt seen for pregnancy complicated by gestational diabetes. Pt is  admitted at 30.3 weeks gestation with placenta previa, pregnancy complicated by GDM.    Ht: 5'2" , Wt: 150 lbs (pre-gravid weight), BMI: 27.4 kg/m2, IBW: 110 lbs +/- 10%, %IBW: 136%  No edema, Skin intact

## 2019-03-31 NOTE — DIETITIAN INITIAL EVALUATION ADULT. - ADHERENCE
good/Pt is a lactovegetarian with pregnancy complicated by GDM (diagnosed ~1 month ago), pt follows with diabetes in pregnancy program and is diligent with carbohydrate counting, pt aims for ~20g carb for breakfast, ~45g for lunch and dinner, 15g for snacks, pt checks blood glucose 3 times per day with fasting levels ~90-necesitating lantus insulin at bedtime, and post-prandial levels 122-136.

## 2019-03-31 NOTE — DIETITIAN INITIAL EVALUATION ADULT. - OTHER INFO
Pt reports pre-gravid weight 150 lbs, pt reports ~10 lb wt gain during pregnancy thus far to reported weight 160 lbs-no weight available in house at this time. Pt with no food allergies, takes vitamin B12 and prenatal multivitamin at home. Pt reports persistent nausea during pregnancy-now under better control, no recent episodes of vomiting, No BM thus far in house. Pt with no difficulty chewing/swallowing. In house pt reports eating well with good appetite.

## 2019-03-31 NOTE — DIETITIAN INITIAL EVALUATION ADULT. - NS AS NUTRI INTERV ED CONTENT
Reviewed carbohydrate counting and GDM meal pattern education-pt receptive, accepted written materials

## 2019-04-01 ENCOUNTER — ASOB RESULT (OUTPATIENT)
Age: 39
End: 2019-04-01

## 2019-04-01 ENCOUNTER — APPOINTMENT (OUTPATIENT)
Dept: ANTEPARTUM | Facility: CLINIC | Age: 39
End: 2019-04-01

## 2019-04-01 LAB
GLUCOSE BLDC GLUCOMTR-MCNC: 111 MG/DL — HIGH (ref 70–99)
GLUCOSE BLDC GLUCOMTR-MCNC: 128 MG/DL — HIGH (ref 70–99)
GLUCOSE BLDC GLUCOMTR-MCNC: 132 MG/DL — HIGH (ref 70–99)
GLUCOSE BLDC GLUCOMTR-MCNC: 146 MG/DL — HIGH (ref 70–99)
GLUCOSE BLDC GLUCOMTR-MCNC: 99 MG/DL — SIGNIFICANT CHANGE UP (ref 70–99)
MAGNESIUM SERPL-MCNC: 4.6 MG/DL — HIGH (ref 1.6–2.6)

## 2019-04-01 PROCEDURE — 76818 FETAL BIOPHYS PROFILE W/NST: CPT | Mod: 26

## 2019-04-01 PROCEDURE — 76816 OB US FOLLOW-UP PER FETUS: CPT | Mod: 26

## 2019-04-01 RX ORDER — DOCUSATE SODIUM 100 MG
100 CAPSULE ORAL
Qty: 0 | Refills: 0 | Status: DISCONTINUED | OUTPATIENT
Start: 2019-04-01 | End: 2019-04-02

## 2019-04-01 RX ADMIN — HUMAN INSULIN 10 UNIT(S): 100 INJECTION, SUSPENSION SUBCUTANEOUS at 21:52

## 2019-04-01 RX ADMIN — Medication 100 MILLIGRAM(S): at 23:11

## 2019-04-01 RX ADMIN — Medication 162 MILLIGRAM(S): at 12:55

## 2019-04-01 RX ADMIN — SODIUM CHLORIDE 3 MILLILITER(S): 9 INJECTION INTRAMUSCULAR; INTRAVENOUS; SUBCUTANEOUS at 22:50

## 2019-04-01 RX ADMIN — Medication 1 MILLIGRAM(S): at 12:54

## 2019-04-01 RX ADMIN — SODIUM CHLORIDE 3 MILLILITER(S): 9 INJECTION INTRAMUSCULAR; INTRAVENOUS; SUBCUTANEOUS at 17:51

## 2019-04-01 RX ADMIN — Medication 1 TABLET(S): at 23:11

## 2019-04-01 RX ADMIN — Medication 200 MILLIGRAM(S): at 21:52

## 2019-04-01 RX ADMIN — Medication 200 MILLIGRAM(S): at 10:49

## 2019-04-01 RX ADMIN — Medication 112 MICROGRAM(S): at 06:29

## 2019-04-01 RX ADMIN — SODIUM CHLORIDE 3 MILLILITER(S): 9 INJECTION INTRAMUSCULAR; INTRAVENOUS; SUBCUTANEOUS at 06:29

## 2019-04-01 RX ADMIN — ENOXAPARIN SODIUM 40 MILLIGRAM(S): 100 INJECTION SUBCUTANEOUS at 12:54

## 2019-04-01 RX ADMIN — Medication 1 TABLET(S): at 12:57

## 2019-04-02 ENCOUNTER — TRANSCRIPTION ENCOUNTER (OUTPATIENT)
Age: 39
End: 2019-04-02

## 2019-04-02 VITALS
SYSTOLIC BLOOD PRESSURE: 97 MMHG | RESPIRATION RATE: 18 BRPM | DIASTOLIC BLOOD PRESSURE: 59 MMHG | TEMPERATURE: 98 F | HEART RATE: 79 BPM

## 2019-04-02 LAB
BLD GP AB SCN SERPL QL: NEGATIVE — SIGNIFICANT CHANGE UP
GLUCOSE BLDC GLUCOMTR-MCNC: 100 MG/DL — HIGH (ref 70–99)
GLUCOSE BLDC GLUCOMTR-MCNC: 154 MG/DL — HIGH (ref 70–99)
GLUCOSE BLDC GLUCOMTR-MCNC: 78 MG/DL — SIGNIFICANT CHANGE UP (ref 70–99)
RH IG SCN BLD-IMP: POSITIVE — SIGNIFICANT CHANGE UP

## 2019-04-02 PROCEDURE — 85384 FIBRINOGEN ACTIVITY: CPT

## 2019-04-02 PROCEDURE — 59025 FETAL NON-STRESS TEST: CPT

## 2019-04-02 PROCEDURE — 86901 BLOOD TYPING SEROLOGIC RH(D): CPT

## 2019-04-02 PROCEDURE — 82962 GLUCOSE BLOOD TEST: CPT

## 2019-04-02 PROCEDURE — 86900 BLOOD TYPING SEROLOGIC ABO: CPT

## 2019-04-02 PROCEDURE — 76816 OB US FOLLOW-UP PER FETUS: CPT

## 2019-04-02 PROCEDURE — 85610 PROTHROMBIN TIME: CPT

## 2019-04-02 PROCEDURE — 86780 TREPONEMA PALLIDUM: CPT

## 2019-04-02 PROCEDURE — 85027 COMPLETE CBC AUTOMATED: CPT

## 2019-04-02 PROCEDURE — 86850 RBC ANTIBODY SCREEN: CPT

## 2019-04-02 PROCEDURE — 80053 COMPREHEN METABOLIC PANEL: CPT

## 2019-04-02 PROCEDURE — G0463: CPT

## 2019-04-02 PROCEDURE — 99231 SBSQ HOSP IP/OBS SF/LOW 25: CPT

## 2019-04-02 PROCEDURE — 85730 THROMBOPLASTIN TIME PARTIAL: CPT

## 2019-04-02 PROCEDURE — 76818 FETAL BIOPHYS PROFILE W/NST: CPT

## 2019-04-02 PROCEDURE — 83735 ASSAY OF MAGNESIUM: CPT

## 2019-04-02 RX ORDER — HUMAN INSULIN 100 [IU]/ML
10 INJECTION, SUSPENSION SUBCUTANEOUS
Qty: 0 | Refills: 0 | COMMUNITY
Start: 2019-04-02

## 2019-04-02 RX ADMIN — Medication 200 MILLIGRAM(S): at 11:22

## 2019-04-02 RX ADMIN — FAMOTIDINE 20 MILLIGRAM(S): 10 INJECTION INTRAVENOUS at 11:30

## 2019-04-02 RX ADMIN — Medication 100 MILLIGRAM(S): at 11:22

## 2019-04-02 RX ADMIN — Medication 1 MILLIGRAM(S): at 12:57

## 2019-04-02 RX ADMIN — ENOXAPARIN SODIUM 40 MILLIGRAM(S): 100 INJECTION SUBCUTANEOUS at 12:57

## 2019-04-02 RX ADMIN — Medication 112 MICROGRAM(S): at 06:32

## 2019-04-02 NOTE — DISCHARGE NOTE ANTEPARTUM - CARE PROVIDER_API CALL
Santo Calderon)  Obstetrics and Gynecology  [address]  [place] [address]  Phone: [phone]  Fax: [phone]  Follow Up Time:

## 2019-04-02 NOTE — DISCHARGE NOTE ANTEPARTUM - MEDICATION SUMMARY - MEDICATIONS TO STOP TAKING
I will STOP taking the medications listed below when I get home from the hospital:    vancomycin 1 g intravenous injection  -- 1 gram(s) intravenously 2 times a day

## 2019-04-02 NOTE — DISCHARGE NOTE ANTEPARTUM - MEDICATION SUMMARY - MEDICATIONS TO TAKE
I will START or STAY ON the medications listed below when I get home from the hospital:    acetaminophen 325 mg oral tablet  -- 3 tab(s) by mouth every 6 hours, As needed, Moderate Pain (4 - 6)  -- Indication: For pain    aspirin 81 mg oral delayed release tablet  -- 1 tab(s) by mouth once a day  -- Indication: For APLS    enoxaparin  -- 40 unit(s) intramuscular once a day  -- Indication: For APLS    insulin isophane (NPH) 100 units/mL human recombinant subcutaneous suspension  -- 10 unit(s) subcutaneous once a day (at bedtime)  -- Indication: For Gestational diabetes    doxylamine-pyridoxine 10 mg-10 mg oral delayed release tablet  -- 1 tab(s) by mouth once a day (at bedtime)  -- Indication: For nausea    metoclopramide 10 mg oral tablet  -- 1 tab(s) by mouth every 6 hours  -- It is very important that you take or use this exactly as directed.  Do not skip doses or discontinue unless directed by your doctor.  May cause drowsiness.  Alcohol may intensify this effect.  Use care when operating dangerous machinery.  Take medication on an empty stomach 1 hour before or 2 to 3 hours after a meal unless otherwise directed by your doctor.    -- Indication: For nausea    Plaquenil 200 mg oral tablet  -- orally 2 times a day  -- Indication: For SLE    Prenatal Multivitamins with Folic Acid 1 mg oral tablet  -- 1 tab(s) by mouth once a day  -- Indication: For vitamin    levothyroxine 112 mcg (0.112 mg) oral tablet  -- 1 tab(s) by mouth once a day  -- Indication: For hypothyroidism

## 2019-04-02 NOTE — DISCHARGE NOTE ANTEPARTUM - PATIENT PORTAL LINK FT
You can access the [email or web address] Patient Portal, offered by [facility], by registering with the following website: [email or web address]

## 2019-04-02 NOTE — DISCHARGE NOTE ANTEPARTUM - PLAN OF CARE
rest Return to labor and delivery if you have vaginal bleeding, leakage of fluid, regular contractions, or the baby is not moving well. See MD as instructed

## 2019-04-02 NOTE — DISCHARGE NOTE ANTEPARTUM - CARE PLAN
Principal Discharge DX:	Vaginal bleeding  Goal:	rest  Assessment and plan of treatment:	Return to labor and delivery if you have vaginal bleeding, leakage of fluid, regular contractions, or the baby is not moving well. Principal Discharge DX:	Vaginal bleeding  Goal:	rest  Assessment and plan of treatment:	Return to labor and delivery if you have vaginal bleeding, leakage of fluid, regular contractions, or the baby is not moving well.  Assessment and plan of treatment:	See MD as instructed

## 2019-04-02 NOTE — DISCHARGE NOTE ANTEPARTUM - HOSPITAL COURSE
Patient presented to L&D with vaginal bleeding. Minimal bleeding noted on exam. Patient was admitted for observation. During her hospital course, her bleeding resolved and she had minimal pelvic pain. She is discharged home with bleeding precautions and is to follow up in the office for routine follow up.

## 2019-04-02 NOTE — DISCHARGE NOTE ANTEPARTUM - ADDITIONAL INSTRUCTIONS
Follow up with Dr. Calderon for prenatal care on 4/18/2019 at 8am and Dr. Bueno of [facility] on 4/11/2019 at 9a. Call the office to confirm your appointment

## 2019-04-03 ENCOUNTER — OUTPATIENT (OUTPATIENT)
Dept: OUTPATIENT SERVICES | Facility: HOSPITAL | Age: 39
LOS: 1 days | End: 2019-04-03
Payer: COMMERCIAL

## 2019-04-03 DIAGNOSIS — Z90.722 ACQUIRED ABSENCE OF OVARIES, BILATERAL: Chronic | ICD-10-CM

## 2019-04-03 DIAGNOSIS — Z3A.00 WEEKS OF GESTATION OF PREGNANCY NOT SPECIFIED: ICD-10-CM

## 2019-04-03 DIAGNOSIS — O26.899 OTHER SPECIFIED PREGNANCY RELATED CONDITIONS, UNSPECIFIED TRIMESTER: ICD-10-CM

## 2019-04-03 DIAGNOSIS — Z98.890 OTHER SPECIFIED POSTPROCEDURAL STATES: Chronic | ICD-10-CM

## 2019-04-03 DIAGNOSIS — Z90.49 ACQUIRED ABSENCE OF OTHER SPECIFIED PARTS OF DIGESTIVE TRACT: Chronic | ICD-10-CM

## 2019-04-03 PROCEDURE — 59025 FETAL NON-STRESS TEST: CPT | Mod: 26,59

## 2019-04-03 PROCEDURE — G0463: CPT

## 2019-04-03 PROCEDURE — 59025 FETAL NON-STRESS TEST: CPT | Mod: 26

## 2019-04-03 RX ORDER — CITRIC ACID/SODIUM CITRATE 300-500 MG
30 SOLUTION, ORAL ORAL ONCE
Qty: 0 | Refills: 0 | Status: COMPLETED | OUTPATIENT
Start: 2019-04-03 | End: 2019-04-03

## 2019-04-03 RX ADMIN — Medication 30 MILLILITER(S): at 20:53

## 2019-04-09 ENCOUNTER — APPOINTMENT (OUTPATIENT)
Dept: OBGYN | Facility: CLINIC | Age: 39
End: 2019-04-09
Payer: COMMERCIAL

## 2019-04-09 ENCOUNTER — NON-APPOINTMENT (OUTPATIENT)
Age: 39
End: 2019-04-09

## 2019-04-09 VITALS
WEIGHT: 162 LBS | DIASTOLIC BLOOD PRESSURE: 79 MMHG | SYSTOLIC BLOOD PRESSURE: 118 MMHG | BODY MASS INDEX: 29.81 KG/M2 | HEIGHT: 62 IN

## 2019-04-09 PROCEDURE — 99080 SPECIAL REPORTS OR FORMS: CPT

## 2019-04-09 PROCEDURE — 0502F SUBSEQUENT PRENATAL CARE: CPT

## 2019-04-11 ENCOUNTER — INPATIENT (INPATIENT)
Facility: HOSPITAL | Age: 39
LOS: 13 days | Discharge: HOME | End: 2019-04-25
Attending: OBSTETRICS & GYNECOLOGY | Admitting: OBSTETRICS & GYNECOLOGY
Payer: COMMERCIAL

## 2019-04-11 ENCOUNTER — APPOINTMENT (OUTPATIENT)
Dept: ANTEPARTUM | Facility: CLINIC | Age: 39
End: 2019-04-11

## 2019-04-11 ENCOUNTER — APPOINTMENT (OUTPATIENT)
Dept: MATERNAL FETAL MEDICINE | Facility: CLINIC | Age: 39
End: 2019-04-11

## 2019-04-11 VITALS — WEIGHT: 158.73 LBS

## 2019-04-11 DIAGNOSIS — Z90.49 ACQUIRED ABSENCE OF OTHER SPECIFIED PARTS OF DIGESTIVE TRACT: Chronic | ICD-10-CM

## 2019-04-11 DIAGNOSIS — O26.899 OTHER SPECIFIED PREGNANCY RELATED CONDITIONS, UNSPECIFIED TRIMESTER: ICD-10-CM

## 2019-04-11 DIAGNOSIS — Z34.80 ENCOUNTER FOR SUPERVISION OF OTHER NORMAL PREGNANCY, UNSPECIFIED TRIMESTER: ICD-10-CM

## 2019-04-11 DIAGNOSIS — Z90.722 ACQUIRED ABSENCE OF OVARIES, BILATERAL: Chronic | ICD-10-CM

## 2019-04-11 DIAGNOSIS — Z98.890 OTHER SPECIFIED POSTPROCEDURAL STATES: Chronic | ICD-10-CM

## 2019-04-11 DIAGNOSIS — Z3A.00 WEEKS OF GESTATION OF PREGNANCY NOT SPECIFIED: ICD-10-CM

## 2019-04-11 LAB
APTT BLD: 27.7 SEC — SIGNIFICANT CHANGE UP (ref 27.5–36.3)
BLD GP AB SCN SERPL QL: NEGATIVE — SIGNIFICANT CHANGE UP
FIBRINOGEN PPP-MCNC: 856 MG/DL — HIGH (ref 350–510)
GLUCOSE BLDC GLUCOMTR-MCNC: 134 MG/DL — HIGH (ref 70–99)
GLUCOSE BLDC GLUCOMTR-MCNC: 145 MG/DL — HIGH (ref 70–99)
GLUCOSE BLDC GLUCOMTR-MCNC: 94 MG/DL — SIGNIFICANT CHANGE UP (ref 70–99)
HCT VFR BLD CALC: 30 % — LOW (ref 34.5–45)
HGB BLD-MCNC: 10.2 G/DL — LOW (ref 11.5–15.5)
INR BLD: 1.03 RATIO — SIGNIFICANT CHANGE UP (ref 0.88–1.16)
MCHC RBC-ENTMCNC: 24.4 PG — LOW (ref 27–34)
MCHC RBC-ENTMCNC: 34.1 GM/DL — SIGNIFICANT CHANGE UP (ref 32–36)
MCV RBC AUTO: 71.4 FL — LOW (ref 80–100)
PLATELET # BLD AUTO: 307 K/UL — SIGNIFICANT CHANGE UP (ref 150–400)
PROTHROM AB SERPL-ACNC: 11.7 SEC — SIGNIFICANT CHANGE UP (ref 10–12.9)
RBC # BLD: 4.2 M/UL — SIGNIFICANT CHANGE UP (ref 3.8–5.2)
RBC # FLD: 14 % — SIGNIFICANT CHANGE UP (ref 10.3–14.5)
RH IG SCN BLD-IMP: POSITIVE — SIGNIFICANT CHANGE UP
T PALLIDUM AB TITR SER: NEGATIVE — SIGNIFICANT CHANGE UP
WBC # BLD: 13.3 K/UL — HIGH (ref 3.8–10.5)
WBC # FLD AUTO: 13.3 K/UL — HIGH (ref 3.8–10.5)

## 2019-04-11 PROCEDURE — 76818 FETAL BIOPHYS PROFILE W/NST: CPT | Mod: 26

## 2019-04-11 PROCEDURE — 76817 TRANSVAGINAL US OBSTETRIC: CPT | Mod: 26

## 2019-04-11 RX ORDER — ASPIRIN/CALCIUM CARB/MAGNESIUM 324 MG
81 TABLET ORAL DAILY
Qty: 0 | Refills: 0 | Status: DISCONTINUED | OUTPATIENT
Start: 2019-04-11 | End: 2019-04-12

## 2019-04-11 RX ORDER — LEVOTHYROXINE SODIUM 125 MCG
112 TABLET ORAL DAILY
Qty: 0 | Refills: 0 | Status: DISCONTINUED | OUTPATIENT
Start: 2019-04-11 | End: 2019-04-25

## 2019-04-11 RX ORDER — HUMAN INSULIN 100 [IU]/ML
10 INJECTION, SUSPENSION SUBCUTANEOUS AT BEDTIME
Qty: 0 | Refills: 0 | Status: DISCONTINUED | OUTPATIENT
Start: 2019-04-11 | End: 2019-04-18

## 2019-04-11 RX ORDER — HUMAN INSULIN 100 [IU]/ML
12 INJECTION, SUSPENSION SUBCUTANEOUS AT BEDTIME
Qty: 0 | Refills: 0 | Status: DISCONTINUED | OUTPATIENT
Start: 2019-04-11 | End: 2019-04-11

## 2019-04-11 RX ORDER — SODIUM CHLORIDE 9 MG/ML
1000 INJECTION INTRAMUSCULAR; INTRAVENOUS; SUBCUTANEOUS
Qty: 0 | Refills: 0 | Status: DISCONTINUED | OUTPATIENT
Start: 2019-04-11 | End: 2019-04-21

## 2019-04-11 RX ORDER — DEXTROSE 50 % IN WATER 50 %
15 SYRINGE (ML) INTRAVENOUS ONCE
Qty: 0 | Refills: 0 | Status: DISCONTINUED | OUTPATIENT
Start: 2019-04-11 | End: 2019-04-21

## 2019-04-11 RX ORDER — DEXTROSE 50 % IN WATER 50 %
25 SYRINGE (ML) INTRAVENOUS ONCE
Qty: 0 | Refills: 0 | Status: DISCONTINUED | OUTPATIENT
Start: 2019-04-11 | End: 2019-04-21

## 2019-04-11 RX ORDER — FAMOTIDINE 10 MG/ML
20 INJECTION INTRAVENOUS EVERY 12 HOURS
Qty: 0 | Refills: 0 | Status: DISCONTINUED | OUTPATIENT
Start: 2019-04-11 | End: 2019-04-11

## 2019-04-11 RX ORDER — FAMOTIDINE 10 MG/ML
20 INJECTION INTRAVENOUS
Qty: 0 | Refills: 0 | Status: DISCONTINUED | OUTPATIENT
Start: 2019-04-11 | End: 2019-04-25

## 2019-04-11 RX ORDER — SODIUM CHLORIDE 9 MG/ML
1000 INJECTION, SOLUTION INTRAVENOUS ONCE
Qty: 0 | Refills: 0 | Status: DISCONTINUED | OUTPATIENT
Start: 2019-04-11 | End: 2019-04-11

## 2019-04-11 RX ORDER — GLUCAGON INJECTION, SOLUTION 0.5 MG/.1ML
1 INJECTION, SOLUTION SUBCUTANEOUS ONCE
Qty: 0 | Refills: 0 | Status: DISCONTINUED | OUTPATIENT
Start: 2019-04-11 | End: 2019-04-21

## 2019-04-11 RX ORDER — SODIUM CHLORIDE 9 MG/ML
1000 INJECTION INTRAMUSCULAR; INTRAVENOUS; SUBCUTANEOUS ONCE
Qty: 0 | Refills: 0 | Status: COMPLETED | OUTPATIENT
Start: 2019-04-11 | End: 2019-04-11

## 2019-04-11 RX ORDER — HEPARIN SODIUM 5000 [USP'U]/ML
5000 INJECTION INTRAVENOUS; SUBCUTANEOUS EVERY 12 HOURS
Qty: 0 | Refills: 0 | Status: DISCONTINUED | OUTPATIENT
Start: 2019-04-11 | End: 2019-04-12

## 2019-04-11 RX ORDER — SODIUM CHLORIDE 9 MG/ML
1000 INJECTION, SOLUTION INTRAVENOUS
Qty: 0 | Refills: 0 | Status: DISCONTINUED | OUTPATIENT
Start: 2019-04-11 | End: 2019-04-21

## 2019-04-11 RX ORDER — HYDROXYCHLOROQUINE SULFATE 200 MG
200 TABLET ORAL EVERY 12 HOURS
Qty: 0 | Refills: 0 | Status: DISCONTINUED | OUTPATIENT
Start: 2019-04-11 | End: 2019-04-25

## 2019-04-11 RX ORDER — DEXTROSE 50 % IN WATER 50 %
12.5 SYRINGE (ML) INTRAVENOUS ONCE
Qty: 0 | Refills: 0 | Status: DISCONTINUED | OUTPATIENT
Start: 2019-04-11 | End: 2019-04-21

## 2019-04-11 RX ORDER — HYDROCORTISONE 1 %
1 OINTMENT (GRAM) TOPICAL THREE TIMES A DAY
Qty: 0 | Refills: 0 | Status: DISCONTINUED | OUTPATIENT
Start: 2019-04-11 | End: 2019-04-21

## 2019-04-11 RX ADMIN — FAMOTIDINE 20 MILLIGRAM(S): 10 INJECTION INTRAVENOUS at 18:24

## 2019-04-11 RX ADMIN — Medication 12 MILLIGRAM(S): at 09:45

## 2019-04-11 RX ADMIN — HEPARIN SODIUM 5000 UNIT(S): 5000 INJECTION INTRAVENOUS; SUBCUTANEOUS at 18:23

## 2019-04-11 RX ADMIN — Medication 200 MILLIGRAM(S): at 13:21

## 2019-04-11 RX ADMIN — Medication 200 MILLIGRAM(S): at 22:32

## 2019-04-11 RX ADMIN — HUMAN INSULIN 10 UNIT(S): 100 INJECTION, SUSPENSION SUBCUTANEOUS at 22:32

## 2019-04-11 RX ADMIN — SODIUM CHLORIDE 1000 MILLILITER(S): 9 INJECTION INTRAMUSCULAR; INTRAVENOUS; SUBCUTANEOUS at 09:00

## 2019-04-11 RX ADMIN — Medication 112 MICROGRAM(S): at 13:23

## 2019-04-11 RX ADMIN — Medication 81 MILLIGRAM(S): at 13:21

## 2019-04-11 RX ADMIN — SODIUM CHLORIDE 125 MILLILITER(S): 9 INJECTION, SOLUTION INTRAVENOUS at 11:27

## 2019-04-12 LAB
APPEARANCE UR: CLEAR — SIGNIFICANT CHANGE UP
BILIRUB UR-MCNC: NEGATIVE — SIGNIFICANT CHANGE UP
COLOR SPEC: SIGNIFICANT CHANGE UP
DIFF PNL FLD: NEGATIVE — SIGNIFICANT CHANGE UP
GLUCOSE BLDC GLUCOMTR-MCNC: 113 MG/DL — HIGH (ref 70–99)
GLUCOSE BLDC GLUCOMTR-MCNC: 114 MG/DL — HIGH (ref 70–99)
GLUCOSE BLDC GLUCOMTR-MCNC: 131 MG/DL — HIGH (ref 70–99)
GLUCOSE BLDC GLUCOMTR-MCNC: 133 MG/DL — HIGH (ref 70–99)
GLUCOSE UR QL: NEGATIVE — SIGNIFICANT CHANGE UP
KETONES UR-MCNC: NEGATIVE — SIGNIFICANT CHANGE UP
LEUKOCYTE ESTERASE UR-ACNC: NEGATIVE — SIGNIFICANT CHANGE UP
NITRITE UR-MCNC: NEGATIVE — SIGNIFICANT CHANGE UP
PH UR: 6.5 — SIGNIFICANT CHANGE UP (ref 5–8)
PROT UR-MCNC: SIGNIFICANT CHANGE UP
SP GR SPEC: 1.01 — SIGNIFICANT CHANGE UP (ref 1.01–1.02)
UROBILINOGEN FLD QL: NEGATIVE — SIGNIFICANT CHANGE UP

## 2019-04-12 PROCEDURE — 99231 SBSQ HOSP IP/OBS SF/LOW 25: CPT

## 2019-04-12 RX ORDER — HEPARIN SODIUM 5000 [USP'U]/ML
7500 INJECTION INTRAVENOUS; SUBCUTANEOUS EVERY 12 HOURS
Qty: 0 | Refills: 0 | Status: DISCONTINUED | OUTPATIENT
Start: 2019-04-12 | End: 2019-04-21

## 2019-04-12 RX ORDER — ASPIRIN/CALCIUM CARB/MAGNESIUM 324 MG
81 TABLET ORAL
Qty: 0 | Refills: 0 | Status: DISCONTINUED | OUTPATIENT
Start: 2019-04-14 | End: 2019-04-21

## 2019-04-12 RX ORDER — ASPIRIN/CALCIUM CARB/MAGNESIUM 324 MG
162 TABLET ORAL
Qty: 0 | Refills: 0 | Status: DISCONTINUED | OUTPATIENT
Start: 2019-04-13 | End: 2019-04-21

## 2019-04-12 RX ADMIN — HUMAN INSULIN 10 UNIT(S): 100 INJECTION, SUSPENSION SUBCUTANEOUS at 22:15

## 2019-04-12 RX ADMIN — Medication 12 MILLIGRAM(S): at 11:02

## 2019-04-12 RX ADMIN — FAMOTIDINE 20 MILLIGRAM(S): 10 INJECTION INTRAVENOUS at 06:28

## 2019-04-12 RX ADMIN — Medication 81 MILLIGRAM(S): at 11:55

## 2019-04-12 RX ADMIN — Medication 200 MILLIGRAM(S): at 22:16

## 2019-04-12 RX ADMIN — HEPARIN SODIUM 7500 UNIT(S): 5000 INJECTION INTRAVENOUS; SUBCUTANEOUS at 17:45

## 2019-04-12 RX ADMIN — Medication 200 MILLIGRAM(S): at 10:18

## 2019-04-12 RX ADMIN — HEPARIN SODIUM 5000 UNIT(S): 5000 INJECTION INTRAVENOUS; SUBCUTANEOUS at 06:28

## 2019-04-12 RX ADMIN — FAMOTIDINE 20 MILLIGRAM(S): 10 INJECTION INTRAVENOUS at 20:21

## 2019-04-12 RX ADMIN — Medication 112 MICROGRAM(S): at 06:28

## 2019-04-12 NOTE — DIETITIAN INITIAL EVALUATION ADULT. - ENERGY NEEDS
ht: 62 inches, pre pregnancy & IVF wt: 135 pounds, prepregnancy BMI: 24.7 kg/m2, IBW: 110 pounds (+/- 10%)  Edema:  Skin per nursing documentation:

## 2019-04-12 NOTE — DIETITIAN INITIAL EVALUATION ADULT. - ADHERENCE
good/Pt following a lactovegetarian, consistent CHO diet PTA. Carbohydrate goals are 20-30g at breakfast, 15g for snack, 45-60g for lunch, 15 g snack, 45-60g for dinner, 15-30g for bedtime snack. Pt states she has been taking 10 units of insulin at bedtime for ~1 month. States fasting blood sugars are <90mg/dL, 1-hr post prandial blood sugars are <140mg/dL. Pt extremely knowledgeable about dietary recommendations. Consumes tofu, legumes, nuts, chickpeas, beans dairy for protein at meals/snacks. Confirms NKFA. Took a prenatal Multivitamin with folic acid PTA.

## 2019-04-12 NOTE — DIETITIAN INITIAL EVALUATION ADULT. - NUTRITIONGOAL OUTCOME1
Patient to meet at least 75% of estimated nutrient needs and adhere to recommend diet modifications.

## 2019-04-12 NOTE — DIETITIAN INITIAL EVALUATION ADULT. - NS AS NUTRI INTERV MEALS SNACK
Continue current diet as ordered. Encourage po intake w/ snacks ordered at meals. Provide food preferences within diet restrictions as feasible./General/healthful diet/Carbohydrate - modified diet

## 2019-04-12 NOTE — DIETITIAN INITIAL EVALUATION ADULT. - NS AS NUTRI INTERV ED CONTENT
Recommended modifications/Reviewed Nutrition Therapy for Gestational Diabetes. Discussed balanced meal pattern, monitoring portion sizes, carbohydrate counting, including protein at each meal and snack, and limiting concentrated sweets. Provided Pt a Consistent Carbohydrate Meal Pattern (Breakfast= 30g, Mid-morning Snack- 15 g, Lunch= 45-60g, Afternoon Snack= 15g, Dinner= 45-60g, Night Snack= 30g)Reinforced importance of self-monitoring blood glucose levels./Purpose of the nutrition education/Priority modifications/Nutrition relationship to health/disease

## 2019-04-12 NOTE — DIETITIAN INITIAL EVALUATION ADULT. - OTHER INFO
Pt seen for h/o GDM this pregnancy. Pt is a 37 y/o  admitted for placenta previa and 2nd vaginal bleed this pregnancy. Current EGA is 32.1 weeks. Pt states she received 2 doses of betamethasone 2 weeks ago when she had her first vaginal bleed. Pt now s/p 1 dose of betamethasone yesterday and now with elevated blood sugar as a result. Pt reports good appetite. Reports some nausea but no vomiting. Reports periods of constipation as well.

## 2019-04-13 LAB
GLUCOSE BLDC GLUCOMTR-MCNC: 110 MG/DL — HIGH (ref 70–99)
GLUCOSE BLDC GLUCOMTR-MCNC: 119 MG/DL — HIGH (ref 70–99)
GLUCOSE BLDC GLUCOMTR-MCNC: 129 MG/DL — HIGH (ref 70–99)
GLUCOSE BLDC GLUCOMTR-MCNC: 156 MG/DL — HIGH (ref 70–99)

## 2019-04-13 RX ORDER — DIPHENHYDRAMINE HCL 50 MG
25 CAPSULE ORAL AT BEDTIME
Qty: 0 | Refills: 0 | Status: DISCONTINUED | OUTPATIENT
Start: 2019-04-13 | End: 2019-04-21

## 2019-04-13 RX ADMIN — FAMOTIDINE 20 MILLIGRAM(S): 10 INJECTION INTRAVENOUS at 18:03

## 2019-04-13 RX ADMIN — FAMOTIDINE 20 MILLIGRAM(S): 10 INJECTION INTRAVENOUS at 08:25

## 2019-04-13 RX ADMIN — Medication 200 MILLIGRAM(S): at 22:12

## 2019-04-13 RX ADMIN — Medication 112 MICROGRAM(S): at 06:34

## 2019-04-13 RX ADMIN — HEPARIN SODIUM 7500 UNIT(S): 5000 INJECTION INTRAVENOUS; SUBCUTANEOUS at 06:33

## 2019-04-13 RX ADMIN — Medication 162 MILLIGRAM(S): at 15:08

## 2019-04-13 RX ADMIN — Medication 200 MILLIGRAM(S): at 12:22

## 2019-04-13 RX ADMIN — HUMAN INSULIN 10 UNIT(S): 100 INJECTION, SUSPENSION SUBCUTANEOUS at 22:12

## 2019-04-13 RX ADMIN — HEPARIN SODIUM 7500 UNIT(S): 5000 INJECTION INTRAVENOUS; SUBCUTANEOUS at 17:56

## 2019-04-14 LAB
BLD GP AB SCN SERPL QL: NEGATIVE — SIGNIFICANT CHANGE UP
GLUCOSE BLDC GLUCOMTR-MCNC: 115 MG/DL — HIGH (ref 70–99)
GLUCOSE BLDC GLUCOMTR-MCNC: 123 MG/DL — HIGH (ref 70–99)
GLUCOSE BLDC GLUCOMTR-MCNC: 132 MG/DL — HIGH (ref 70–99)
GLUCOSE BLDC GLUCOMTR-MCNC: 94 MG/DL — SIGNIFICANT CHANGE UP (ref 70–99)
GLUCOSE BLDC GLUCOMTR-MCNC: 96 MG/DL — SIGNIFICANT CHANGE UP (ref 70–99)
RH IG SCN BLD-IMP: POSITIVE — SIGNIFICANT CHANGE UP

## 2019-04-14 RX ADMIN — FAMOTIDINE 20 MILLIGRAM(S): 10 INJECTION INTRAVENOUS at 07:58

## 2019-04-14 RX ADMIN — Medication 112 MICROGRAM(S): at 06:26

## 2019-04-14 RX ADMIN — HEPARIN SODIUM 7500 UNIT(S): 5000 INJECTION INTRAVENOUS; SUBCUTANEOUS at 18:00

## 2019-04-14 RX ADMIN — Medication 81 MILLIGRAM(S): at 09:51

## 2019-04-14 RX ADMIN — HUMAN INSULIN 10 UNIT(S): 100 INJECTION, SUSPENSION SUBCUTANEOUS at 21:52

## 2019-04-14 RX ADMIN — Medication 200 MILLIGRAM(S): at 21:52

## 2019-04-14 RX ADMIN — Medication 200 MILLIGRAM(S): at 09:52

## 2019-04-14 RX ADMIN — HEPARIN SODIUM 7500 UNIT(S): 5000 INJECTION INTRAVENOUS; SUBCUTANEOUS at 06:26

## 2019-04-14 RX ADMIN — FAMOTIDINE 20 MILLIGRAM(S): 10 INJECTION INTRAVENOUS at 17:56

## 2019-04-15 ENCOUNTER — ASOB RESULT (OUTPATIENT)
Age: 39
End: 2019-04-15

## 2019-04-15 ENCOUNTER — APPOINTMENT (OUTPATIENT)
Dept: ANTEPARTUM | Facility: CLINIC | Age: 39
End: 2019-04-15

## 2019-04-15 LAB
GLUCOSE BLDC GLUCOMTR-MCNC: 102 MG/DL — HIGH (ref 70–99)
GLUCOSE BLDC GLUCOMTR-MCNC: 117 MG/DL — HIGH (ref 70–99)
GLUCOSE BLDC GLUCOMTR-MCNC: 155 MG/DL — HIGH (ref 70–99)
GLUCOSE BLDC GLUCOMTR-MCNC: 95 MG/DL — SIGNIFICANT CHANGE UP (ref 70–99)

## 2019-04-15 PROCEDURE — 59510 CESAREAN DELIVERY: CPT

## 2019-04-15 PROCEDURE — 76818 FETAL BIOPHYS PROFILE W/NST: CPT | Mod: 26

## 2019-04-15 RX ADMIN — HUMAN INSULIN 10 UNIT(S): 100 INJECTION, SUSPENSION SUBCUTANEOUS at 21:19

## 2019-04-15 RX ADMIN — Medication 162 MILLIGRAM(S): at 10:12

## 2019-04-15 RX ADMIN — FAMOTIDINE 20 MILLIGRAM(S): 10 INJECTION INTRAVENOUS at 16:13

## 2019-04-15 RX ADMIN — FAMOTIDINE 20 MILLIGRAM(S): 10 INJECTION INTRAVENOUS at 08:41

## 2019-04-15 RX ADMIN — Medication 200 MILLIGRAM(S): at 10:13

## 2019-04-15 RX ADMIN — HEPARIN SODIUM 7500 UNIT(S): 5000 INJECTION INTRAVENOUS; SUBCUTANEOUS at 05:51

## 2019-04-15 RX ADMIN — Medication 200 MILLIGRAM(S): at 22:05

## 2019-04-15 RX ADMIN — Medication 112 MICROGRAM(S): at 05:52

## 2019-04-15 RX ADMIN — HEPARIN SODIUM 7500 UNIT(S): 5000 INJECTION INTRAVENOUS; SUBCUTANEOUS at 17:52

## 2019-04-16 LAB
GLUCOSE BLDC GLUCOMTR-MCNC: 115 MG/DL — HIGH (ref 70–99)
GLUCOSE BLDC GLUCOMTR-MCNC: 128 MG/DL — HIGH (ref 70–99)
GLUCOSE BLDC GLUCOMTR-MCNC: 152 MG/DL — HIGH (ref 70–99)
GLUCOSE BLDC GLUCOMTR-MCNC: 96 MG/DL — SIGNIFICANT CHANGE UP (ref 70–99)

## 2019-04-16 RX ADMIN — Medication 112 MICROGRAM(S): at 06:27

## 2019-04-16 RX ADMIN — FAMOTIDINE 20 MILLIGRAM(S): 10 INJECTION INTRAVENOUS at 18:12

## 2019-04-16 RX ADMIN — HEPARIN SODIUM 7500 UNIT(S): 5000 INJECTION INTRAVENOUS; SUBCUTANEOUS at 18:11

## 2019-04-16 RX ADMIN — HEPARIN SODIUM 7500 UNIT(S): 5000 INJECTION INTRAVENOUS; SUBCUTANEOUS at 06:27

## 2019-04-16 RX ADMIN — Medication 200 MILLIGRAM(S): at 09:45

## 2019-04-16 RX ADMIN — FAMOTIDINE 20 MILLIGRAM(S): 10 INJECTION INTRAVENOUS at 06:27

## 2019-04-16 RX ADMIN — HUMAN INSULIN 10 UNIT(S): 100 INJECTION, SUSPENSION SUBCUTANEOUS at 22:27

## 2019-04-16 RX ADMIN — Medication 200 MILLIGRAM(S): at 22:26

## 2019-04-16 RX ADMIN — Medication 81 MILLIGRAM(S): at 09:45

## 2019-04-17 LAB
APTT BLD: 32.1 SEC — SIGNIFICANT CHANGE UP (ref 27.5–36.3)
BLD GP AB SCN SERPL QL: NEGATIVE — SIGNIFICANT CHANGE UP
FIBRINOGEN PPP-MCNC: 828 MG/DL — HIGH (ref 350–510)
GLUCOSE BLDC GLUCOMTR-MCNC: 109 MG/DL — HIGH (ref 70–99)
GLUCOSE BLDC GLUCOMTR-MCNC: 120 MG/DL — HIGH (ref 70–99)
GLUCOSE BLDC GLUCOMTR-MCNC: 125 MG/DL — HIGH (ref 70–99)
GLUCOSE BLDC GLUCOMTR-MCNC: 98 MG/DL — SIGNIFICANT CHANGE UP (ref 70–99)
HCT VFR BLD CALC: 30 % — LOW (ref 34.5–45)
HGB BLD-MCNC: 10.3 G/DL — LOW (ref 11.5–15.5)
INR BLD: 1.03 RATIO — SIGNIFICANT CHANGE UP (ref 0.88–1.16)
MCHC RBC-ENTMCNC: 23.8 PG — LOW (ref 27–34)
MCHC RBC-ENTMCNC: 34.4 GM/DL — SIGNIFICANT CHANGE UP (ref 32–36)
MCV RBC AUTO: 69.2 FL — LOW (ref 80–100)
PLATELET # BLD AUTO: 363 K/UL — SIGNIFICANT CHANGE UP (ref 150–400)
PROTHROM AB SERPL-ACNC: 11.9 SEC — SIGNIFICANT CHANGE UP (ref 10–12.9)
RBC # BLD: 4.34 M/UL — SIGNIFICANT CHANGE UP (ref 3.8–5.2)
RBC # FLD: 13.9 % — SIGNIFICANT CHANGE UP (ref 10.3–14.5)
RH IG SCN BLD-IMP: POSITIVE — SIGNIFICANT CHANGE UP
WBC # BLD: 16.2 K/UL — HIGH (ref 3.8–10.5)
WBC # FLD AUTO: 16.2 K/UL — HIGH (ref 3.8–10.5)

## 2019-04-17 RX ORDER — FAMOTIDINE 10 MG/ML
20 INJECTION INTRAVENOUS ONCE
Qty: 0 | Refills: 0 | Status: COMPLETED | OUTPATIENT
Start: 2019-04-17 | End: 2019-04-17

## 2019-04-17 RX ADMIN — Medication 162 MILLIGRAM(S): at 08:18

## 2019-04-17 RX ADMIN — Medication 112 MICROGRAM(S): at 06:22

## 2019-04-17 RX ADMIN — Medication 200 MILLIGRAM(S): at 11:36

## 2019-04-17 RX ADMIN — FAMOTIDINE 20 MILLIGRAM(S): 10 INJECTION INTRAVENOUS at 00:42

## 2019-04-17 RX ADMIN — FAMOTIDINE 20 MILLIGRAM(S): 10 INJECTION INTRAVENOUS at 17:56

## 2019-04-17 RX ADMIN — Medication 200 MILLIGRAM(S): at 22:09

## 2019-04-17 RX ADMIN — HEPARIN SODIUM 7500 UNIT(S): 5000 INJECTION INTRAVENOUS; SUBCUTANEOUS at 17:56

## 2019-04-17 RX ADMIN — HUMAN INSULIN 10 UNIT(S): 100 INJECTION, SUSPENSION SUBCUTANEOUS at 23:08

## 2019-04-17 RX ADMIN — Medication 1 TABLET(S): at 22:09

## 2019-04-17 RX ADMIN — HEPARIN SODIUM 7500 UNIT(S): 5000 INJECTION INTRAVENOUS; SUBCUTANEOUS at 06:22

## 2019-04-17 RX ADMIN — FAMOTIDINE 20 MILLIGRAM(S): 10 INJECTION INTRAVENOUS at 08:18

## 2019-04-18 ENCOUNTER — APPOINTMENT (OUTPATIENT)
Dept: ANTEPARTUM | Facility: CLINIC | Age: 39
End: 2019-04-18

## 2019-04-18 ENCOUNTER — ASOB RESULT (OUTPATIENT)
Age: 39
End: 2019-04-18

## 2019-04-18 LAB
GLUCOSE BLDC GLUCOMTR-MCNC: 113 MG/DL — HIGH (ref 70–99)
GLUCOSE BLDC GLUCOMTR-MCNC: 116 MG/DL — HIGH (ref 70–99)
GLUCOSE BLDC GLUCOMTR-MCNC: 132 MG/DL — HIGH (ref 70–99)
GLUCOSE BLDC GLUCOMTR-MCNC: 144 MG/DL — HIGH (ref 70–99)

## 2019-04-18 RX ORDER — ACETAMINOPHEN 500 MG
975 TABLET ORAL EVERY 6 HOURS
Qty: 0 | Refills: 0 | Status: DISCONTINUED | OUTPATIENT
Start: 2019-04-18 | End: 2019-04-21

## 2019-04-18 RX ORDER — DOCUSATE SODIUM 100 MG
100 CAPSULE ORAL
Qty: 0 | Refills: 0 | Status: DISCONTINUED | OUTPATIENT
Start: 2019-04-18 | End: 2019-04-21

## 2019-04-18 RX ORDER — HUMAN INSULIN 100 [IU]/ML
14 INJECTION, SUSPENSION SUBCUTANEOUS AT BEDTIME
Qty: 0 | Refills: 0 | Status: DISCONTINUED | OUTPATIENT
Start: 2019-04-18 | End: 2019-04-21

## 2019-04-18 RX ADMIN — Medication 200 MILLIGRAM(S): at 10:15

## 2019-04-18 RX ADMIN — Medication 200 MILLIGRAM(S): at 21:12

## 2019-04-18 RX ADMIN — Medication 112 MICROGRAM(S): at 06:46

## 2019-04-18 RX ADMIN — Medication 975 MILLIGRAM(S): at 17:45

## 2019-04-18 RX ADMIN — Medication 81 MILLIGRAM(S): at 08:19

## 2019-04-18 RX ADMIN — HEPARIN SODIUM 7500 UNIT(S): 5000 INJECTION INTRAVENOUS; SUBCUTANEOUS at 06:46

## 2019-04-18 RX ADMIN — FAMOTIDINE 20 MILLIGRAM(S): 10 INJECTION INTRAVENOUS at 08:19

## 2019-04-18 RX ADMIN — Medication 100 MILLIGRAM(S): at 21:12

## 2019-04-18 RX ADMIN — FAMOTIDINE 20 MILLIGRAM(S): 10 INJECTION INTRAVENOUS at 17:46

## 2019-04-18 RX ADMIN — HEPARIN SODIUM 7500 UNIT(S): 5000 INJECTION INTRAVENOUS; SUBCUTANEOUS at 17:46

## 2019-04-18 RX ADMIN — Medication 975 MILLIGRAM(S): at 17:15

## 2019-04-18 RX ADMIN — HUMAN INSULIN 14 UNIT(S): 100 INJECTION, SUSPENSION SUBCUTANEOUS at 22:36

## 2019-04-18 NOTE — CHART NOTE - NSCHARTNOTEFT_GEN_A_CORE
Nutrition Follow Up Note  Patient seen for: Length of stay.     Pt is a 39 y/o  admitted for placenta previa and 2nd vaginal bleed this pregnancy. Current EGA is 33 weeks, plan for delivery at 36 weeks. Pt had 3rd vaginal bleed this pregnancy last night. States her baby is measuring in the 38th percentile for weight but was 48th percentile for weight at last sonogram.    Source: patient, comprehensive chart review, RN Divya    Diet : CSTCHOGDM     Patient reports: good appetite, nausea at times but no vomiting and nausea does not affect PO intake. C/o constipation and asking for colace-RN aware.     PO intake : 100%     Source for PO intake: patient, staff    Daily Weight in k.2 ()  no new Wt to address.    Pertinent Medications: MEDICATIONS  (STANDING):  aspirin  chewable 162 milliGRAM(s) Oral <User Schedule>  aspirin  chewable 81 milliGRAM(s) Oral <User Schedule>  dextrose 5% + sodium chloride 0.9%. 1000 milliLiter(s) (125 mL/Hr) IV Continuous <Continuous>  dextrose 5%. 1000 milliLiter(s) (50 mL/Hr) IV Continuous <Continuous>  dextrose 50% Injectable 12.5 Gram(s) IV Push once  dextrose 50% Injectable 25 Gram(s) IV Push once  dextrose 50% Injectable 25 Gram(s) IV Push once  famotidine    Tablet 20 milliGRAM(s) Oral two times a day  heparin  Injectable 7500 Unit(s) SubCutaneous every 12 hours  hydroxychloroquine 200 milliGRAM(s) Oral every 12 hours  insulin NPH human recombinant 10 Unit(s) SubCutaneous at bedtime  levothyroxine 112 MICROGram(s) Oral daily  sodium chloride 0.9%. 1000 milliLiter(s) (125 mL/Hr) IV Continuous <Continuous>    MEDICATIONS  (PRN):  dextrose 40% Gel 15 Gram(s) Oral once PRN Blood Glucose LESS THAN 70 milliGRAM(s)/deciliter  diphenhydrAMINE 25 milliGRAM(s) Oral at bedtime PRN Insomnia  glucagon  Injectable 1 milliGRAM(s) IntraMuscular once PRN Glucose LESS THAN 70 milligrams/deciliter  hydrocortisone 1% Cream 1 Application(s) Topical three times a day PRN Rash and/or Itching    Pertinent Labs:   Finger Sticks:  POCT Blood Glucose.: 144 mg/dL (04-18 @ 10:23)  POCT Blood Glucose.: 116 mg/dL ( @ 06:54)  POCT Blood Glucose.: 125 mg/dL ( @ 23:06)  POCT Blood Glucose.: 109 mg/dL ( @ 16:45)      Skin per nursing documentation: intact.  Edema: none noted.    Estimated Needs:   [x] no change since previous assessment  [ ] recalculated:     Previous Nutrition Diagnosis: increased nutrient needs  Nutrition Diagnosis is: ongoing, Pt in third trimester of pregnancy.    New Nutrition Diagnosis: N/a     Interventions:     Recommend  1) Consider increasing NPH tonight  2) Reinforced Consistent Carbohydrate dietary recommendations, encouraged more protein intake and better snack options  3) Continue to encourage good po intake at meals   4) Continue to provide food preferences within diet restrictions as feasible   5) Encouraged Pt to have family bring in vegetarian, protein-rich foods from home she likes    Monitoring and Evaluation:     Continue to monitor Nutritional intake, Tolerance to diet prescription, weights, labs, skin integrity    RD remains available upon request and will follow up per protocol        Riley Huntley, RD, CDN, CDE. Pager: [phone]

## 2019-04-19 LAB
GLUCOSE BLDC GLUCOMTR-MCNC: 118 MG/DL — HIGH (ref 70–99)
GLUCOSE BLDC GLUCOMTR-MCNC: 177 MG/DL — HIGH (ref 70–99)
GLUCOSE BLDC GLUCOMTR-MCNC: 90 MG/DL — SIGNIFICANT CHANGE UP (ref 70–99)

## 2019-04-19 PROCEDURE — 99231 SBSQ HOSP IP/OBS SF/LOW 25: CPT

## 2019-04-19 RX ADMIN — FAMOTIDINE 20 MILLIGRAM(S): 10 INJECTION INTRAVENOUS at 18:14

## 2019-04-19 RX ADMIN — HEPARIN SODIUM 7500 UNIT(S): 5000 INJECTION INTRAVENOUS; SUBCUTANEOUS at 06:21

## 2019-04-19 RX ADMIN — Medication 112 MICROGRAM(S): at 06:21

## 2019-04-19 RX ADMIN — HUMAN INSULIN 14 UNIT(S): 100 INJECTION, SUSPENSION SUBCUTANEOUS at 22:06

## 2019-04-19 RX ADMIN — Medication 1 TABLET(S): at 00:00

## 2019-04-19 RX ADMIN — HEPARIN SODIUM 7500 UNIT(S): 5000 INJECTION INTRAVENOUS; SUBCUTANEOUS at 18:14

## 2019-04-19 RX ADMIN — Medication 1 TABLET(S): at 00:50

## 2019-04-19 RX ADMIN — Medication 200 MILLIGRAM(S): at 22:06

## 2019-04-19 RX ADMIN — FAMOTIDINE 20 MILLIGRAM(S): 10 INJECTION INTRAVENOUS at 06:21

## 2019-04-19 RX ADMIN — Medication 100 MILLIGRAM(S): at 06:21

## 2019-04-19 RX ADMIN — Medication 100 MILLIGRAM(S): at 18:14

## 2019-04-19 RX ADMIN — Medication 162 MILLIGRAM(S): at 08:42

## 2019-04-19 RX ADMIN — Medication 200 MILLIGRAM(S): at 12:23

## 2019-04-20 ENCOUNTER — TRANSCRIPTION ENCOUNTER (OUTPATIENT)
Age: 39
End: 2019-04-20

## 2019-04-20 LAB
APTT BLD: 31.3 SEC — SIGNIFICANT CHANGE UP (ref 27.5–36.3)
BASOPHILS # BLD AUTO: 0 K/UL — SIGNIFICANT CHANGE UP (ref 0–0.2)
BASOPHILS NFR BLD AUTO: 0.3 % — SIGNIFICANT CHANGE UP (ref 0–2)
BLD GP AB SCN SERPL QL: NEGATIVE — SIGNIFICANT CHANGE UP
EOSINOPHIL # BLD AUTO: 0.1 K/UL — SIGNIFICANT CHANGE UP (ref 0–0.5)
EOSINOPHIL NFR BLD AUTO: 0.7 % — SIGNIFICANT CHANGE UP (ref 0–6)
FIBRINOGEN PPP-MCNC: 694 MG/DL — HIGH (ref 350–510)
GLUCOSE BLDC GLUCOMTR-MCNC: 124 MG/DL — HIGH (ref 70–99)
GLUCOSE BLDC GLUCOMTR-MCNC: 131 MG/DL — HIGH (ref 70–99)
GLUCOSE BLDC GLUCOMTR-MCNC: 92 MG/DL — SIGNIFICANT CHANGE UP (ref 70–99)
GLUCOSE BLDC GLUCOMTR-MCNC: 95 MG/DL — SIGNIFICANT CHANGE UP (ref 70–99)
HCT VFR BLD CALC: 29.4 % — LOW (ref 34.5–45)
HGB BLD-MCNC: 10 G/DL — LOW (ref 11.5–15.5)
INR BLD: 0.97 RATIO — SIGNIFICANT CHANGE UP (ref 0.88–1.16)
LYMPHOCYTES # BLD AUTO: 29.1 % — SIGNIFICANT CHANGE UP (ref 13–44)
LYMPHOCYTES # BLD AUTO: 3.9 K/UL — HIGH (ref 1–3.3)
MCHC RBC-ENTMCNC: 24.1 PG — LOW (ref 27–34)
MCHC RBC-ENTMCNC: 34.2 GM/DL — SIGNIFICANT CHANGE UP (ref 32–36)
MCV RBC AUTO: 70.5 FL — LOW (ref 80–100)
MONOCYTES # BLD AUTO: 0.7 K/UL — SIGNIFICANT CHANGE UP (ref 0–0.9)
MONOCYTES NFR BLD AUTO: 5.4 % — SIGNIFICANT CHANGE UP (ref 2–14)
NEUTROPHILS # BLD AUTO: 8.5 K/UL — HIGH (ref 1.8–7.4)
NEUTROPHILS NFR BLD AUTO: 64.5 % — SIGNIFICANT CHANGE UP (ref 43–77)
PLATELET # BLD AUTO: 313 K/UL — SIGNIFICANT CHANGE UP (ref 150–400)
PROTHROM AB SERPL-ACNC: 11 SEC — SIGNIFICANT CHANGE UP (ref 10–12.9)
RBC # BLD: 4.17 M/UL — SIGNIFICANT CHANGE UP (ref 3.8–5.2)
RBC # FLD: 14.3 % — SIGNIFICANT CHANGE UP (ref 10.3–14.5)
RH IG SCN BLD-IMP: POSITIVE — SIGNIFICANT CHANGE UP
WBC # BLD: 13.2 K/UL — HIGH (ref 3.8–10.5)
WBC # FLD AUTO: 13.2 K/UL — HIGH (ref 3.8–10.5)

## 2019-04-20 RX ADMIN — Medication 975 MILLIGRAM(S): at 10:32

## 2019-04-20 RX ADMIN — FAMOTIDINE 20 MILLIGRAM(S): 10 INJECTION INTRAVENOUS at 17:28

## 2019-04-20 RX ADMIN — HUMAN INSULIN 14 UNIT(S): 100 INJECTION, SUSPENSION SUBCUTANEOUS at 22:08

## 2019-04-20 RX ADMIN — Medication 200 MILLIGRAM(S): at 09:08

## 2019-04-20 RX ADMIN — Medication 200 MILLIGRAM(S): at 22:09

## 2019-04-20 RX ADMIN — FAMOTIDINE 20 MILLIGRAM(S): 10 INJECTION INTRAVENOUS at 08:15

## 2019-04-20 RX ADMIN — Medication 81 MILLIGRAM(S): at 09:08

## 2019-04-20 RX ADMIN — HEPARIN SODIUM 7500 UNIT(S): 5000 INJECTION INTRAVENOUS; SUBCUTANEOUS at 17:28

## 2019-04-20 RX ADMIN — HEPARIN SODIUM 7500 UNIT(S): 5000 INJECTION INTRAVENOUS; SUBCUTANEOUS at 06:11

## 2019-04-20 RX ADMIN — Medication 975 MILLIGRAM(S): at 12:18

## 2019-04-20 RX ADMIN — Medication 25 MILLIGRAM(S): at 23:39

## 2019-04-20 RX ADMIN — Medication 112 MICROGRAM(S): at 06:11

## 2019-04-20 RX ADMIN — Medication 100 MILLIGRAM(S): at 17:29

## 2019-04-21 ENCOUNTER — RESULT REVIEW (OUTPATIENT)
Age: 39
End: 2019-04-21

## 2019-04-21 LAB
ANION GAP SERPL CALC-SCNC: 11 MMOL/L — SIGNIFICANT CHANGE UP (ref 5–17)
APTT BLD: 27.2 SEC — LOW (ref 27.5–36.3)
APTT BLD: 30.2 SEC — SIGNIFICANT CHANGE UP (ref 27.5–36.3)
BUN SERPL-MCNC: 4 MG/DL — LOW (ref 7–23)
CALCIUM SERPL-MCNC: 8.8 MG/DL — SIGNIFICANT CHANGE UP (ref 8.4–10.5)
CHLORIDE SERPL-SCNC: 106 MMOL/L — SIGNIFICANT CHANGE UP (ref 96–108)
CO2 SERPL-SCNC: 18 MMOL/L — LOW (ref 22–31)
CREAT SERPL-MCNC: <0.3 MG/DL — LOW (ref 0.5–1.3)
GLUCOSE BLDC GLUCOMTR-MCNC: 126 MG/DL — HIGH (ref 70–99)
GLUCOSE BLDC GLUCOMTR-MCNC: 151 MG/DL — HIGH (ref 70–99)
GLUCOSE BLDC GLUCOMTR-MCNC: 95 MG/DL — SIGNIFICANT CHANGE UP (ref 70–99)
GLUCOSE SERPL-MCNC: 103 MG/DL — HIGH (ref 70–99)
HCT VFR BLD CALC: 27 % — LOW (ref 34.5–45)
HCT VFR BLD CALC: 31.1 % — LOW (ref 34.5–45)
HGB BLD-MCNC: 10.7 G/DL — LOW (ref 11.5–15.5)
HGB BLD-MCNC: 9.5 G/DL — LOW (ref 11.5–15.5)
INR BLD: 0.94 RATIO — SIGNIFICANT CHANGE UP (ref 0.88–1.16)
INR BLD: 0.98 RATIO — SIGNIFICANT CHANGE UP (ref 0.88–1.16)
MCHC RBC-ENTMCNC: 24.3 PG — LOW (ref 27–34)
MCHC RBC-ENTMCNC: 24.4 PG — LOW (ref 27–34)
MCHC RBC-ENTMCNC: 34.4 GM/DL — SIGNIFICANT CHANGE UP (ref 32–36)
MCHC RBC-ENTMCNC: 35.2 GM/DL — SIGNIFICANT CHANGE UP (ref 32–36)
MCV RBC AUTO: 69.3 FL — LOW (ref 80–100)
MCV RBC AUTO: 70.8 FL — LOW (ref 80–100)
PLATELET # BLD AUTO: 306 K/UL — SIGNIFICANT CHANGE UP (ref 150–400)
PLATELET # BLD AUTO: 339 K/UL — SIGNIFICANT CHANGE UP (ref 150–400)
POTASSIUM SERPL-MCNC: 4 MMOL/L — SIGNIFICANT CHANGE UP (ref 3.5–5.3)
POTASSIUM SERPL-SCNC: 4 MMOL/L — SIGNIFICANT CHANGE UP (ref 3.5–5.3)
PROTHROM AB SERPL-ACNC: 10.7 SEC — SIGNIFICANT CHANGE UP (ref 10–12.9)
PROTHROM AB SERPL-ACNC: 11.2 SEC — SIGNIFICANT CHANGE UP (ref 10–12.9)
RBC # BLD: 3.9 M/UL — SIGNIFICANT CHANGE UP (ref 3.8–5.2)
RBC # BLD: 4.39 M/UL — SIGNIFICANT CHANGE UP (ref 3.8–5.2)
RBC # FLD: 13.7 % — SIGNIFICANT CHANGE UP (ref 10.3–14.5)
RBC # FLD: 14.3 % — SIGNIFICANT CHANGE UP (ref 10.3–14.5)
SODIUM SERPL-SCNC: 135 MMOL/L — SIGNIFICANT CHANGE UP (ref 135–145)
WBC # BLD: 13.4 K/UL — HIGH (ref 3.8–10.5)
WBC # BLD: 18.5 K/UL — HIGH (ref 3.8–10.5)
WBC # FLD AUTO: 13.4 K/UL — HIGH (ref 3.8–10.5)
WBC # FLD AUTO: 18.5 K/UL — HIGH (ref 3.8–10.5)

## 2019-04-21 PROCEDURE — 88307 TISSUE EXAM BY PATHOLOGIST: CPT | Mod: 26

## 2019-04-21 PROCEDURE — 59510 CESAREAN DELIVERY: CPT

## 2019-04-21 PROCEDURE — 49000 EXPLORATION OF ABDOMEN: CPT

## 2019-04-21 RX ORDER — TETANUS TOXOID, REDUCED DIPHTHERIA TOXOID AND ACELLULAR PERTUSSIS VACCINE, ADSORBED 5; 2.5; 8; 8; 2.5 [IU]/.5ML; [IU]/.5ML; UG/.5ML; UG/.5ML; UG/.5ML
0.5 SUSPENSION INTRAMUSCULAR ONCE
Qty: 0 | Refills: 0 | Status: COMPLETED | OUTPATIENT
Start: 2019-04-21

## 2019-04-21 RX ORDER — DIPHENHYDRAMINE HCL 50 MG
12.5 CAPSULE ORAL EVERY 4 HOURS
Qty: 0 | Refills: 0 | Status: DISCONTINUED | OUTPATIENT
Start: 2019-04-21 | End: 2019-04-23

## 2019-04-21 RX ORDER — SODIUM CHLORIDE 9 MG/ML
1000 INJECTION, SOLUTION INTRAVENOUS
Qty: 0 | Refills: 0 | Status: DISCONTINUED | OUTPATIENT
Start: 2019-04-21 | End: 2019-04-25

## 2019-04-21 RX ORDER — DOCUSATE SODIUM 100 MG
100 CAPSULE ORAL
Qty: 0 | Refills: 0 | Status: DISCONTINUED | OUTPATIENT
Start: 2019-04-21 | End: 2019-04-25

## 2019-04-21 RX ORDER — OXYTOCIN 10 UNIT/ML
41.67 VIAL (ML) INJECTION
Qty: 20 | Refills: 0 | Status: DISCONTINUED | OUTPATIENT
Start: 2019-04-21 | End: 2019-04-25

## 2019-04-21 RX ORDER — ONDANSETRON 8 MG/1
4 TABLET, FILM COATED ORAL EVERY 6 HOURS
Qty: 0 | Refills: 0 | Status: DISCONTINUED | OUTPATIENT
Start: 2019-04-21 | End: 2019-04-23

## 2019-04-21 RX ORDER — ACETAMINOPHEN 500 MG
975 TABLET ORAL EVERY 6 HOURS
Qty: 0 | Refills: 0 | Status: DISCONTINUED | OUTPATIENT
Start: 2019-04-21 | End: 2019-04-25

## 2019-04-21 RX ORDER — KETOROLAC TROMETHAMINE 30 MG/ML
30 SYRINGE (ML) INJECTION EVERY 6 HOURS
Qty: 0 | Refills: 0 | Status: COMPLETED | OUTPATIENT
Start: 2019-04-21 | End: 2019-04-23

## 2019-04-21 RX ORDER — SODIUM CHLORIDE 9 MG/ML
1000 INJECTION, SOLUTION INTRAVENOUS
Qty: 0 | Refills: 0 | Status: DISCONTINUED | OUTPATIENT
Start: 2019-04-21 | End: 2019-04-21

## 2019-04-21 RX ORDER — LANOLIN
1 OINTMENT (GRAM) TOPICAL
Qty: 0 | Refills: 0 | Status: DISCONTINUED | OUTPATIENT
Start: 2019-04-21 | End: 2019-04-25

## 2019-04-21 RX ORDER — OXYCODONE HYDROCHLORIDE 5 MG/1
5 TABLET ORAL EVERY 4 HOURS
Qty: 0 | Refills: 0 | Status: COMPLETED | OUTPATIENT
Start: 2019-04-21 | End: 2019-04-28

## 2019-04-21 RX ORDER — OXYCODONE HYDROCHLORIDE 5 MG/1
5 TABLET ORAL
Qty: 0 | Refills: 0 | Status: COMPLETED | OUTPATIENT
Start: 2019-04-21 | End: 2019-04-28

## 2019-04-21 RX ORDER — SIMETHICONE 80 MG/1
80 TABLET, CHEWABLE ORAL EVERY 4 HOURS
Qty: 0 | Refills: 0 | Status: DISCONTINUED | OUTPATIENT
Start: 2019-04-21 | End: 2019-04-25

## 2019-04-21 RX ORDER — DEXAMETHASONE 0.5 MG/5ML
4 ELIXIR ORAL EVERY 6 HOURS
Qty: 0 | Refills: 0 | Status: DISCONTINUED | OUTPATIENT
Start: 2019-04-21 | End: 2019-04-23

## 2019-04-21 RX ORDER — HEPARIN SODIUM 5000 [USP'U]/ML
5000 INJECTION INTRAVENOUS; SUBCUTANEOUS EVERY 12 HOURS
Qty: 0 | Refills: 0 | Status: DISCONTINUED | OUTPATIENT
Start: 2019-04-21 | End: 2019-04-23

## 2019-04-21 RX ORDER — NALOXONE HYDROCHLORIDE 4 MG/.1ML
0.1 SPRAY NASAL
Qty: 0 | Refills: 0 | Status: DISCONTINUED | OUTPATIENT
Start: 2019-04-21 | End: 2019-04-23

## 2019-04-21 RX ORDER — FENTANYL/BUPIVACAINE/NS/PF 2MCG/ML-.1
5 PLASTIC BAG, INJECTION (ML) INJECTION
Qty: 0 | Refills: 0 | Status: DISCONTINUED | OUTPATIENT
Start: 2019-04-21 | End: 2019-04-23

## 2019-04-21 RX ORDER — FERROUS SULFATE 325(65) MG
325 TABLET ORAL DAILY
Qty: 0 | Refills: 0 | Status: DISCONTINUED | OUTPATIENT
Start: 2019-04-21 | End: 2019-04-22

## 2019-04-21 RX ORDER — OXYTOCIN 10 UNIT/ML
333.33 VIAL (ML) INJECTION
Qty: 20 | Refills: 0 | Status: DISCONTINUED | OUTPATIENT
Start: 2019-04-21 | End: 2019-04-25

## 2019-04-21 RX ORDER — GLYCERIN ADULT
1 SUPPOSITORY, RECTAL RECTAL AT BEDTIME
Qty: 0 | Refills: 0 | Status: DISCONTINUED | OUTPATIENT
Start: 2019-04-21 | End: 2019-04-25

## 2019-04-21 RX ORDER — DIPHENHYDRAMINE HCL 50 MG
25 CAPSULE ORAL EVERY 6 HOURS
Qty: 0 | Refills: 0 | Status: DISCONTINUED | OUTPATIENT
Start: 2019-04-21 | End: 2019-04-25

## 2019-04-21 RX ORDER — DIPHENOXYLATE HCL/ATROPINE 2.5-.025MG
2 TABLET ORAL ONCE
Qty: 0 | Refills: 0 | Status: DISCONTINUED | OUTPATIENT
Start: 2019-04-21 | End: 2019-04-21

## 2019-04-21 RX ORDER — IBUPROFEN 200 MG
600 TABLET ORAL EVERY 6 HOURS
Qty: 0 | Refills: 0 | Status: COMPLETED | OUTPATIENT
Start: 2019-04-21 | End: 2020-03-19

## 2019-04-21 RX ADMIN — Medication 112 MICROGRAM(S): at 05:49

## 2019-04-21 RX ADMIN — Medication 100 MILLIGRAM(S): at 05:49

## 2019-04-21 RX ADMIN — FAMOTIDINE 20 MILLIGRAM(S): 10 INJECTION INTRAVENOUS at 08:27

## 2019-04-21 RX ADMIN — Medication 200 MILLIGRAM(S): at 10:55

## 2019-04-21 RX ADMIN — Medication 30 MILLIGRAM(S): at 23:07

## 2019-04-21 RX ADMIN — HEPARIN SODIUM 7500 UNIT(S): 5000 INJECTION INTRAVENOUS; SUBCUTANEOUS at 05:48

## 2019-04-21 RX ADMIN — Medication 162 MILLIGRAM(S): at 08:27

## 2019-04-21 RX ADMIN — Medication 975 MILLIGRAM(S): at 23:08

## 2019-04-21 RX ADMIN — Medication 2 TABLET(S): at 18:53

## 2019-04-21 RX ADMIN — Medication 200 MILLIGRAM(S): at 23:09

## 2019-04-21 NOTE — PRE-ANESTHESIA EVALUATION ADULT - NSANTHOSAYNRD_GEN_A_CORE
No. MASTER screening performed.  STOP BANG Legend: 0-2 = LOW Risk; 3-4 = INTERMEDIATE Risk; 5-8 = HIGH Risk

## 2019-04-21 NOTE — PRE-ANESTHESIA EVALUATION ADULT - NSANTHADDINFOFT_GEN_ALL_CORE
-2 b/l IV's  -4 u pRBC's on hold  -A-line  -possible CVC  -possible conversion to GETA  -requested case to occur in main OR @ 1430; per OB, request that case occur in L&D OR

## 2019-04-21 NOTE — PROVIDER CONTACT NOTE (OTHER) - ACTION/TREATMENT ORDERED:
MD Joaquin made aware. No changes in orders at this time. 10u NPH administered as ordered. Will continue to monitor.
transfer to &D

## 2019-04-21 NOTE — PRE-ANESTHESIA EVALUATION ADULT - NSANTHPMHFT_GEN_ALL_CORE
PMHx: frozen pelvis d/t h/o ruptured appendicitis, JEN(+), gDM, anxiety d/o, GERD     @ 33wga with h/o IUFD

## 2019-04-22 LAB
BASOPHILS # BLD AUTO: 0 K/UL — SIGNIFICANT CHANGE UP (ref 0–0.2)
BASOPHILS NFR BLD AUTO: 0 % — SIGNIFICANT CHANGE UP (ref 0–2)
EOSINOPHIL # BLD AUTO: 0 K/UL — SIGNIFICANT CHANGE UP (ref 0–0.5)
EOSINOPHIL NFR BLD AUTO: 0.1 % — SIGNIFICANT CHANGE UP (ref 0–6)
HCT VFR BLD CALC: 25.8 % — LOW (ref 34.5–45)
HGB BLD-MCNC: 8.8 G/DL — LOW (ref 11.5–15.5)
LYMPHOCYTES # BLD AUTO: 2 K/UL — SIGNIFICANT CHANGE UP (ref 1–3.3)
LYMPHOCYTES # BLD AUTO: 9.8 % — LOW (ref 13–44)
MCHC RBC-ENTMCNC: 24.1 PG — LOW (ref 27–34)
MCHC RBC-ENTMCNC: 33.9 GM/DL — SIGNIFICANT CHANGE UP (ref 32–36)
MCV RBC AUTO: 71.2 FL — LOW (ref 80–100)
MONOCYTES # BLD AUTO: 1.1 K/UL — HIGH (ref 0–0.9)
MONOCYTES NFR BLD AUTO: 5.6 % — SIGNIFICANT CHANGE UP (ref 2–14)
NEUTROPHILS # BLD AUTO: 17.2 K/UL — HIGH (ref 1.8–7.4)
NEUTROPHILS NFR BLD AUTO: 84.5 % — HIGH (ref 43–77)
PLATELET # BLD AUTO: 262 K/UL — SIGNIFICANT CHANGE UP (ref 150–400)
RBC # BLD: 3.62 M/UL — LOW (ref 3.8–5.2)
RBC # FLD: 14.4 % — SIGNIFICANT CHANGE UP (ref 10.3–14.5)
WBC # BLD: 20.3 K/UL — HIGH (ref 3.8–10.5)
WBC # FLD AUTO: 20.3 K/UL — HIGH (ref 3.8–10.5)

## 2019-04-22 RX ORDER — ASCORBIC ACID 60 MG
500 TABLET,CHEWABLE ORAL
Qty: 0 | Refills: 0 | Status: DISCONTINUED | OUTPATIENT
Start: 2019-04-22 | End: 2019-04-25

## 2019-04-22 RX ORDER — FERROUS SULFATE 325(65) MG
325 TABLET ORAL
Qty: 0 | Refills: 0 | Status: DISCONTINUED | OUTPATIENT
Start: 2019-04-22 | End: 2019-04-25

## 2019-04-22 RX ADMIN — FAMOTIDINE 20 MILLIGRAM(S): 10 INJECTION INTRAVENOUS at 08:15

## 2019-04-22 RX ADMIN — Medication 100 MILLIGRAM(S): at 12:12

## 2019-04-22 RX ADMIN — Medication 200 MILLIGRAM(S): at 20:46

## 2019-04-22 RX ADMIN — ONDANSETRON 4 MILLIGRAM(S): 8 TABLET, FILM COATED ORAL at 22:09

## 2019-04-22 RX ADMIN — Medication 112 MICROGRAM(S): at 05:49

## 2019-04-22 RX ADMIN — Medication 975 MILLIGRAM(S): at 05:49

## 2019-04-22 RX ADMIN — FAMOTIDINE 20 MILLIGRAM(S): 10 INJECTION INTRAVENOUS at 17:54

## 2019-04-22 RX ADMIN — Medication 975 MILLIGRAM(S): at 23:56

## 2019-04-22 RX ADMIN — Medication 30 MILLIGRAM(S): at 23:56

## 2019-04-22 RX ADMIN — Medication 30 MILLIGRAM(S): at 12:12

## 2019-04-22 RX ADMIN — SIMETHICONE 80 MILLIGRAM(S): 80 TABLET, CHEWABLE ORAL at 17:54

## 2019-04-22 RX ADMIN — Medication 200 MILLIGRAM(S): at 09:15

## 2019-04-22 RX ADMIN — Medication 325 MILLIGRAM(S): at 12:12

## 2019-04-22 RX ADMIN — Medication 500 MILLIGRAM(S): at 17:54

## 2019-04-22 RX ADMIN — Medication 30 MILLIGRAM(S): at 17:56

## 2019-04-22 RX ADMIN — Medication 30 MILLIGRAM(S): at 17:40

## 2019-04-22 RX ADMIN — Medication 30 MILLIGRAM(S): at 00:00

## 2019-04-22 RX ADMIN — Medication 975 MILLIGRAM(S): at 17:56

## 2019-04-22 RX ADMIN — SODIUM CHLORIDE 125 MILLILITER(S): 9 INJECTION, SOLUTION INTRAVENOUS at 12:23

## 2019-04-22 RX ADMIN — Medication 975 MILLIGRAM(S): at 12:19

## 2019-04-22 RX ADMIN — HEPARIN SODIUM 5000 UNIT(S): 5000 INJECTION INTRAVENOUS; SUBCUTANEOUS at 17:55

## 2019-04-22 RX ADMIN — Medication 30 MILLIGRAM(S): at 05:52

## 2019-04-22 RX ADMIN — ONDANSETRON 4 MILLIGRAM(S): 8 TABLET, FILM COATED ORAL at 02:08

## 2019-04-22 RX ADMIN — Medication 975 MILLIGRAM(S): at 17:40

## 2019-04-22 RX ADMIN — Medication 975 MILLIGRAM(S): at 00:00

## 2019-04-22 RX ADMIN — HEPARIN SODIUM 5000 UNIT(S): 5000 INJECTION INTRAVENOUS; SUBCUTANEOUS at 05:49

## 2019-04-22 RX ADMIN — Medication 25 MILLIGRAM(S): at 23:57

## 2019-04-22 RX ADMIN — Medication 30 MILLIGRAM(S): at 17:55

## 2019-04-22 RX ADMIN — Medication 325 MILLIGRAM(S): at 17:54

## 2019-04-22 RX ADMIN — ONDANSETRON 4 MILLIGRAM(S): 8 TABLET, FILM COATED ORAL at 14:14

## 2019-04-22 RX ADMIN — Medication 25 MILLIGRAM(S): at 02:08

## 2019-04-22 NOTE — PROGRESS NOTE ADULT - SUBJECTIVE AND OBJECTIVE BOX
Day 1 of Anesthesia Pain Management Service    SUBJECTIVE: I'm okay  Pain Scale Score:    [X] Refer to charted pain scores    THERAPY: Epidural Bupivacaine 0.01 % and Fentanyl 3 micrograms/mL     Demand Dose: 3 mL  Lockout: 15 minutes   Continuous Rate:  10 mL    MEDICATIONS  (STANDING):  acetaminophen   Tablet .. 975 milliGRAM(s) Oral every 6 hours  diphtheria/tetanus/pertussis (acellular) Vaccine (ADAcel) 0.5 milliLiter(s) IntraMuscular once  famotidine    Tablet 20 milliGRAM(s) Oral two times a day  fentaNYL (3 MICROgram(s)/mL) + BUpivacaine 0.01% in 0.9% Sodium Chloride PCEA 250 milliLiter(s) Epidural PCA Continuous  ferrous    sulfate 325 milliGRAM(s) Oral daily  heparin  Injectable 5000 Unit(s) SubCutaneous every 12 hours  hydroxychloroquine 200 milliGRAM(s) Oral every 12 hours  ibuprofen  Tablet. 600 milliGRAM(s) Oral every 6 hours  ketorolac   Injectable 30 milliGRAM(s) IV Push every 6 hours  lactated ringers. 1000 milliLiter(s) (125 mL/Hr) IV Continuous <Continuous>  levothyroxine 112 MICROGram(s) Oral daily  oxyCODONE    IR 5 milliGRAM(s) Oral every 3 hours  oxytocin Infusion 41.667 milliUNIT(s)/Min (125 mL/Hr) IV Continuous <Continuous>  oxytocin Infusion 333.333 milliUNIT(s)/Min (1000 mL/Hr) IV Continuous <Continuous>  oxytocin Infusion 41.667 milliUNIT(s)/Min (125 mL/Hr) IV Continuous <Continuous>  prenatal multivitamin 1 Tablet(s) Oral daily    MEDICATIONS  (PRN):  dexamethasone  Injectable 4 milliGRAM(s) IV Push every 6 hours PRN Nausea, IF ondansetron is ineffective after 30 - 60 minutes  diphenhydrAMINE 25 milliGRAM(s) Oral every 6 hours PRN Itching  diphenhydrAMINE   Injectable 12.5 milliGRAM(s) IV Push every 4 hours PRN Pruritus  docusate sodium 100 milliGRAM(s) Oral two times a day PRN Stool Softening  fentaNYL (3 MICROgram(s)/mL) + BUpivacaine 0.01% in 0.9% Sodium Chloride PCEA Rescue Clinician Bolus 5 milliLiter(s) Epidural every 15 minutes PRN Severe Pain (7 - 10)  glycerin Suppository - Adult 1 Suppository(s) Rectal at bedtime PRN Constipation  lanolin Ointment 1 Application(s) Topical every 3 hours PRN Sore Nipples  naloxone Injectable 0.1 milliGRAM(s) IV Push every 3 minutes PRN For ANY of the following changes in patient status:  A. RR LESS THAN 10 breaths per minute, B. Oxygen saturation LESS THAN 90%, C. Sedation score of 6  ondansetron Injectable 4 milliGRAM(s) IV Push every 6 hours PRN Nausea  oxyCODONE    IR 5 milliGRAM(s) Oral every 4 hours PRN Severe Pain (7 - 10)  simethicone 80 milliGRAM(s) Chew every 4 hours PRN Gas      OBJECTIVE:    Assessment of Epidural Catheter Site: 	    [X] Dressing changed to tegaderm	[X] Site non-tender	[X] Site without erythema, discharge, edema  [X] Epidural tubing and connection checked	[X] Gross neurological exam within normal limits  [ ] Catheter removed – tip intact		    PT/INR - ( 21 Apr 2019 21:53 )   PT: 10.7 sec;   INR: 0.94 ratio         PTT - ( 21 Apr 2019 21:53 )  PTT:27.2 sec                      8.8    20.3  )-----------( 262      ( 22 Apr 2019 06:31 )             25.8     Vital Signs Last 24 Hrs  T(C): 37 (04-22-19 @ 05:22), Max: 37 (04-22-19 @ 05:22)  T(F): 98.6 (04-22-19 @ 05:22), Max: 98.6 (04-22-19 @ 05:22)  HR: 98 (04-22-19 @ 05:22) (80 - 106)  BP: 96/64 (04-22-19 @ 05:22) (86/48 - 108/57)  BP(mean): 77 (04-21-19 @ 21:20) (63 - 78)  RR: 18 (04-22-19 @ 05:22) (18 - 18)  SpO2: 96% (04-22-19 @ 05:22) (95% - 100%)      Sedation Score:	[X] Alert	[ ] Drowsy	[ ] Arousable  [ ] Asleep  [ ] Unresponsive    Side Effects:	[X] None	[ ] Nausea	[ ] Vomiting  [ ] Pruritus  		[ ] Weakness  [ ] Numbness  [ ] Other:    ASSESSMENT/ PLAN:    Therapy:                         [X] Continue   [ ] Discontinue   [ ] Change to PRN Analgesics    Documentation and Verification of current medications:  [X] Done	[ ] Not done, not eligible, reason:    Comments: Day 1 of Anesthesia Pain Management Service    SUBJECTIVE: I'm okay  Pain Scale Score:    [X] Refer to charted pain scores    THERAPY: Epidural Bupivacaine 0.01 % and Fentanyl 3 micrograms/mL     Demand Dose: 3 mL  Lockout: 15 minutes   Continuous Rate:  10 mL    MEDICATIONS  (STANDING):  acetaminophen   Tablet .. 975 milliGRAM(s) Oral every 6 hours  diphtheria/tetanus/pertussis (acellular) Vaccine (ADAcel) 0.5 milliLiter(s) IntraMuscular once  famotidine    Tablet 20 milliGRAM(s) Oral two times a day  fentaNYL (3 MICROgram(s)/mL) + BUpivacaine 0.01% in 0.9% Sodium Chloride PCEA 250 milliLiter(s) Epidural PCA Continuous  ferrous    sulfate 325 milliGRAM(s) Oral daily  heparin  Injectable 5000 Unit(s) SubCutaneous every 12 hours  hydroxychloroquine 200 milliGRAM(s) Oral every 12 hours  ibuprofen  Tablet. 600 milliGRAM(s) Oral every 6 hours  ketorolac   Injectable 30 milliGRAM(s) IV Push every 6 hours  lactated ringers. 1000 milliLiter(s) (125 mL/Hr) IV Continuous <Continuous>  levothyroxine 112 MICROGram(s) Oral daily  oxyCODONE    IR 5 milliGRAM(s) Oral every 3 hours  oxytocin Infusion 41.667 milliUNIT(s)/Min (125 mL/Hr) IV Continuous <Continuous>  oxytocin Infusion 333.333 milliUNIT(s)/Min (1000 mL/Hr) IV Continuous <Continuous>  oxytocin Infusion 41.667 milliUNIT(s)/Min (125 mL/Hr) IV Continuous <Continuous>  prenatal multivitamin 1 Tablet(s) Oral daily    MEDICATIONS  (PRN):  dexamethasone  Injectable 4 milliGRAM(s) IV Push every 6 hours PRN Nausea, IF ondansetron is ineffective after 30 - 60 minutes  diphenhydrAMINE 25 milliGRAM(s) Oral every 6 hours PRN Itching  diphenhydrAMINE   Injectable 12.5 milliGRAM(s) IV Push every 4 hours PRN Pruritus  docusate sodium 100 milliGRAM(s) Oral two times a day PRN Stool Softening  fentaNYL (3 MICROgram(s)/mL) + BUpivacaine 0.01% in 0.9% Sodium Chloride PCEA Rescue Clinician Bolus 5 milliLiter(s) Epidural every 15 minutes PRN Severe Pain (7 - 10)  glycerin Suppository - Adult 1 Suppository(s) Rectal at bedtime PRN Constipation  lanolin Ointment 1 Application(s) Topical every 3 hours PRN Sore Nipples  naloxone Injectable 0.1 milliGRAM(s) IV Push every 3 minutes PRN For ANY of the following changes in patient status:  A. RR LESS THAN 10 breaths per minute, B. Oxygen saturation LESS THAN 90%, C. Sedation score of 6  ondansetron Injectable 4 milliGRAM(s) IV Push every 6 hours PRN Nausea  oxyCODONE    IR 5 milliGRAM(s) Oral every 4 hours PRN Severe Pain (7 - 10)  simethicone 80 milliGRAM(s) Chew every 4 hours PRN Gas      OBJECTIVE:    Assessment of Epidural Catheter Site: 	    [X] Dressing changed to tegaderm	[X] Site non-tender	[X] Site without erythema, discharge, edema  [X] Epidural tubing and connection checked	[X] Gross neurological exam within normal limits  [ ] Catheter removed – tip intact		    PT/INR - ( 21 Apr 2019 21:53 )   PT: 10.7 sec;   INR: 0.94 ratio         PTT - ( 21 Apr 2019 21:53 )  PTT:27.2 sec                      8.8    20.3  )-----------( 262      ( 22 Apr 2019 06:31 )             25.8     Vital Signs Last 24 Hrs  T(C): 37 (04-22-19 @ 05:22), Max: 37 (04-22-19 @ 05:22)  T(F): 98.6 (04-22-19 @ 05:22), Max: 98.6 (04-22-19 @ 05:22)  HR: 98 (04-22-19 @ 05:22) (80 - 106)  BP: 96/64 (04-22-19 @ 05:22) (86/48 - 108/57)  BP(mean): 77 (04-21-19 @ 21:20) (63 - 78)  RR: 18 (04-22-19 @ 05:22) (18 - 18)  SpO2: 96% (04-22-19 @ 05:22) (95% - 100%)      Sedation Score:	[X] Alert	[ ] Drowsy	[ ] Arousable  [ ] Asleep  [ ] Unresponsive    Side Effects:	[  ] None	[ ] Nausea	[ ] Vomiting  [X ] Pruritus  		[ ] Weakness  [ ] Numbness  [ ] Other:    ASSESSMENT/ PLAN:    Therapy:                         [X] Continue   [ ] Discontinue   [ ] Change to PRN Analgesics    Documentation and Verification of current medications:  [X] Done	[ ] Not done, not eligible, reason:    Comments: Increase continuous to 12mL. Reeducated to use

## 2019-04-22 NOTE — PROGRESS NOTE ADULT - SUBJECTIVE AND OBJECTIVE BOX
Pain Management Attending Addendum    SUBJECTIVE: Patient doing well with PCEA    Therapy:    [X] PCEA    OBJECTIVE:   [X] Pain appropriately controlled    [ ] Other:    Side Effects:  [X] None	             [ ] Nausea              [ ] Pruritis        [ ] Weakness          [ ] Numbness        	[ ] Other:    ASSESSMENT/PLAN:    [X] Continue current therapy    [ ] Therapy changed to:    [ ] PRN Analgesics   [ ] IV PCA    Comments: INCREASE PCEA BOLUS DOSAGE

## 2019-04-22 NOTE — PROGRESS NOTE ADULT - SUBJECTIVE AND OBJECTIVE BOX
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 Ambulating without difficulty  Pt is tolerating regular diet. Pain well controlled.  She denies nausea and vomiting.  Carpio removed and voiding  She reports normal postpartum bleeding  Using the breast pump    Physical exam:    Vital Signs Last 24 Hrs  T(C): 36.9 (2019 09:00), Max: 37 (2019 05:22)  T(F): 98.4 (2019 09:00), Max: 98.6 (2019 05:22)  HR: 98 (2019 09:00) (80 - 106)  BP: 101/61 (2019 09:00) (86/48 - 108/57)  BP(mean): 77 (2019 21:20) (63 - 78)  RR: 18 (2019 09:00) (18 - 18)  SpO2: 98% (2019 09:00) (96% - 100%)    Gen: NAD  Breast: Soft, nontender, not engorged.  Abdomen: Soft, nontender, no distension , firm uterine fundus at umbilicus.  Incision: Clean, dry, and intact, vertical midline skin incision.   Pelvic: Normal lochia noted  Ext: No calf tenderness, no hyper reflexia       LABS:                        8.8    20.3  )-----------( 262      ( 2019 06:31 )             25.8                         9.5    18.5  )-----------( 306      ( 2019 20:59 )             27.0                         10.7   13.4  )-----------( 339      ( 2019 14:31 )             31.1       19 @ 20:59      135  |  106  |  4<L>  ----------------------------<  103<H>  4.0   |  18<L>  |  <0.30<L>        Ca    8.8      2019 20:59          Allergies    latex (Rash)  No Known Drug Allergies    Intolerances      MEDICATIONS  (STANDING):  acetaminophen   Tablet .. 975 milliGRAM(s) Oral every 6 hours  diphtheria/tetanus/pertussis (acellular) Vaccine (ADAcel) 0.5 milliLiter(s) IntraMuscular once  famotidine    Tablet 20 milliGRAM(s) Oral two times a day  fentaNYL (3 MICROgram(s)/mL) + BUpivacaine 0.01% in 0.9% Sodium Chloride PCEA 250 milliLiter(s) Epidural PCA Continuous  ferrous    sulfate 325 milliGRAM(s) Oral daily  heparin  Injectable 5000 Unit(s) SubCutaneous every 12 hours  hydroxychloroquine 200 milliGRAM(s) Oral every 12 hours  ibuprofen  Tablet. 600 milliGRAM(s) Oral every 6 hours  ketorolac   Injectable 30 milliGRAM(s) IV Push every 6 hours  lactated ringers. 1000 milliLiter(s) (125 mL/Hr) IV Continuous <Continuous>  levothyroxine 112 MICROGram(s) Oral daily  oxyCODONE    IR 5 milliGRAM(s) Oral every 3 hours  oxytocin Infusion 41.667 milliUNIT(s)/Min (125 mL/Hr) IV Continuous <Continuous>  oxytocin Infusion 333.333 milliUNIT(s)/Min (1000 mL/Hr) IV Continuous <Continuous>  oxytocin Infusion 41.667 milliUNIT(s)/Min (125 mL/Hr) IV Continuous <Continuous>  prenatal multivitamin 1 Tablet(s) Oral daily    MEDICATIONS  (PRN):  dexamethasone  Injectable 4 milliGRAM(s) IV Push every 6 hours PRN Nausea, IF ondansetron is ineffective after 30 - 60 minutes  diphenhydrAMINE 25 milliGRAM(s) Oral every 6 hours PRN Itching  diphenhydrAMINE   Injectable 12.5 milliGRAM(s) IV Push every 4 hours PRN Pruritus  docusate sodium 100 milliGRAM(s) Oral two times a day PRN Stool Softening  fentaNYL (3 MICROgram(s)/mL) + BUpivacaine 0.01% in 0.9% Sodium Chloride PCEA Rescue Clinician Bolus 5 milliLiter(s) Epidural every 15 minutes PRN Severe Pain (7 - 10)  glycerin Suppository - Adult 1 Suppository(s) Rectal at bedtime PRN Constipation  lanolin Ointment 1 Application(s) Topical every 3 hours PRN Sore Nipples  naloxone Injectable 0.1 milliGRAM(s) IV Push every 3 minutes PRN For ANY of the following changes in patient status:  A. RR LESS THAN 10 breaths per minute, B. Oxygen saturation LESS THAN 90%, C. Sedation score of 6  ondansetron Injectable 4 milliGRAM(s) IV Push every 6 hours PRN Nausea  oxyCODONE    IR 5 milliGRAM(s) Oral every 4 hours PRN Severe Pain (7 - 10)  simethicone 80 milliGRAM(s) Chew every 4 hours PRN Gas        Assessment and Plan  POD # 1  s/p  section.   38y old  female, s/P C/S POD#1 for complete previa, + frozen pelvis, Hx of SLE/APLS on plaquanil   Patient stable, baby is in the NICU  Encourage ambulation  Continue with PCEA/PCA  Start lovenox for postpartum prophylaxsis x 6 weeks, start 12 hours after epidural removal.   Carpio removed.   Regular diet as tolerated  Follow up CBC    Gina Rivera MD  Office Number [phone]

## 2019-04-22 NOTE — PROGRESS NOTE ADULT - SUBJECTIVE AND OBJECTIVE BOX
OB Progress Note:  Delivery, POD#2    S: 37yo POD#2 s/p HTCS at 33-3 for placenta previa in the setting of vaginal bleeding, c/b maternal hx of endometirosis, SLE, APLS, and hypothyroidism. EBL 1200. Her pain is well controlled. She is tolerating clears but not yet passing flatus. Will try regular diet this am. Denies N/V. Denies CP/SOB/lightheadedness/dizziness.   She is ambulating without difficulty.   Voiding spontaneously.     O:   Vital Signs Last 24 Hrs  T(C): 37 (2019 05:22), Max: 37 (2019 05:22)  T(F): 98.6 (2019 05:22), Max: 98.6 (2019 05:22)  HR: 98 (2019 05:22) (80 - 106)  BP: 96/64 (2019 05:22) (86/48 - 108/57)  BP(mean): 77 (2019 21:20) (63 - 78)  RR: 18 (2019 05:22) (18 - 18)  SpO2: 96% (2019 05:22) (95% - 100%)    PE:  General: NAD  Abdomen: Mildly distended, appropriately tender, incision c/d/i, Dressing in place, fundus firm.  Extremities: NTTP, no erythema, no pitting edema  Pelvic: Minimal lochia    Labs:  Blood type: B Positive  Rubella IgG: RPR: Negative                          9.5<L>   18.5<H> >-----------< 306    (  @ 20:59 )             27.0<L>                        10.7<L>   13.4<H> >-----------< 339    (  @ 14:31 )             31.1<L>                        10.0<L>   13.2<H> >-----------< 313    (  @ 07:25 )             29.4<L>    19 @ 20:59      135  |  106  |  4<L>  ----------------------------<  103<H>  4.0   |  18<L>  |  <0.30<L>        Ca    8.8      2019 20:59

## 2019-04-23 ENCOUNTER — APPOINTMENT (OUTPATIENT)
Dept: OBGYN | Facility: CLINIC | Age: 39
End: 2019-04-23

## 2019-04-23 LAB
BLD GP AB SCN SERPL QL: NEGATIVE — SIGNIFICANT CHANGE UP
HCT VFR BLD CALC: 22.1 % — LOW (ref 34.5–45)
HCT VFR BLD CALC: 26.2 % — LOW (ref 34.5–45)
HGB BLD-MCNC: 7.6 G/DL — LOW (ref 11.5–15.5)
HGB BLD-MCNC: 9.1 G/DL — LOW (ref 11.5–15.5)
MCHC RBC-ENTMCNC: 23.7 PG — LOW (ref 27–34)
MCHC RBC-ENTMCNC: 24.5 PG — LOW (ref 27–34)
MCHC RBC-ENTMCNC: 34.4 GM/DL — SIGNIFICANT CHANGE UP (ref 32–36)
MCHC RBC-ENTMCNC: 34.7 GM/DL — SIGNIFICANT CHANGE UP (ref 32–36)
MCV RBC AUTO: 68.4 FL — LOW (ref 80–100)
MCV RBC AUTO: 71.1 FL — LOW (ref 80–100)
PLATELET # BLD AUTO: 276 K/UL — SIGNIFICANT CHANGE UP (ref 150–400)
PLATELET # BLD AUTO: 330 K/UL — SIGNIFICANT CHANGE UP (ref 150–400)
RBC # BLD: 3.1 M/UL — LOW (ref 3.8–5.2)
RBC # BLD: 3.83 M/UL — SIGNIFICANT CHANGE UP (ref 3.8–5.2)
RBC # FLD: 14.4 % — SIGNIFICANT CHANGE UP (ref 10.3–14.5)
RBC # FLD: 15.9 % — HIGH (ref 10.3–14.5)
RH IG SCN BLD-IMP: POSITIVE — SIGNIFICANT CHANGE UP
WBC # BLD: 18.3 K/UL — HIGH (ref 3.8–10.5)
WBC # BLD: 19.8 K/UL — HIGH (ref 3.8–10.5)
WBC # FLD AUTO: 18.3 K/UL — HIGH (ref 3.8–10.5)
WBC # FLD AUTO: 19.8 K/UL — HIGH (ref 3.8–10.5)

## 2019-04-23 RX ORDER — ACETAMINOPHEN 500 MG
975 TABLET ORAL ONCE
Qty: 0 | Refills: 0 | Status: COMPLETED | OUTPATIENT
Start: 2019-04-23 | End: 2019-04-23

## 2019-04-23 RX ORDER — ONDANSETRON 8 MG/1
4 TABLET, FILM COATED ORAL ONCE
Qty: 0 | Refills: 0 | Status: COMPLETED | OUTPATIENT
Start: 2019-04-23 | End: 2019-04-23

## 2019-04-23 RX ORDER — DIPHENHYDRAMINE HCL 50 MG
25 CAPSULE ORAL ONCE
Qty: 0 | Refills: 0 | Status: COMPLETED | OUTPATIENT
Start: 2019-04-23 | End: 2019-04-23

## 2019-04-23 RX ORDER — ENOXAPARIN SODIUM 100 MG/ML
40 INJECTION SUBCUTANEOUS DAILY
Qty: 0 | Refills: 0 | Status: DISCONTINUED | OUTPATIENT
Start: 2019-04-23 | End: 2019-04-25

## 2019-04-23 RX ORDER — OXYCODONE HYDROCHLORIDE 5 MG/1
5 TABLET ORAL
Qty: 0 | Refills: 0 | Status: DISCONTINUED | OUTPATIENT
Start: 2019-04-23 | End: 2019-04-25

## 2019-04-23 RX ORDER — IBUPROFEN 200 MG
600 TABLET ORAL EVERY 6 HOURS
Qty: 0 | Refills: 0 | Status: DISCONTINUED | OUTPATIENT
Start: 2019-04-23 | End: 2019-04-25

## 2019-04-23 RX ORDER — OXYCODONE HYDROCHLORIDE 5 MG/1
5 TABLET ORAL EVERY 4 HOURS
Qty: 0 | Refills: 0 | Status: DISCONTINUED | OUTPATIENT
Start: 2019-04-23 | End: 2019-04-25

## 2019-04-23 RX ADMIN — ONDANSETRON 4 MILLIGRAM(S): 8 TABLET, FILM COATED ORAL at 13:30

## 2019-04-23 RX ADMIN — Medication 600 MILLIGRAM(S): at 16:00

## 2019-04-23 RX ADMIN — SIMETHICONE 80 MILLIGRAM(S): 80 TABLET, CHEWABLE ORAL at 07:46

## 2019-04-23 RX ADMIN — Medication 200 MILLIGRAM(S): at 21:53

## 2019-04-23 RX ADMIN — OXYCODONE HYDROCHLORIDE 5 MILLIGRAM(S): 5 TABLET ORAL at 15:28

## 2019-04-23 RX ADMIN — Medication 100 MILLIGRAM(S): at 21:54

## 2019-04-23 RX ADMIN — SIMETHICONE 80 MILLIGRAM(S): 80 TABLET, CHEWABLE ORAL at 21:54

## 2019-04-23 RX ADMIN — OXYCODONE HYDROCHLORIDE 5 MILLIGRAM(S): 5 TABLET ORAL at 18:01

## 2019-04-23 RX ADMIN — Medication 500 MILLIGRAM(S): at 18:02

## 2019-04-23 RX ADMIN — OXYCODONE HYDROCHLORIDE 5 MILLIGRAM(S): 5 TABLET ORAL at 21:13

## 2019-04-23 RX ADMIN — FAMOTIDINE 20 MILLIGRAM(S): 10 INJECTION INTRAVENOUS at 21:13

## 2019-04-23 RX ADMIN — Medication 600 MILLIGRAM(S): at 22:00

## 2019-04-23 RX ADMIN — Medication 975 MILLIGRAM(S): at 06:30

## 2019-04-23 RX ADMIN — Medication 25 MILLIGRAM(S): at 15:22

## 2019-04-23 RX ADMIN — Medication 30 MILLIGRAM(S): at 00:30

## 2019-04-23 RX ADMIN — Medication 600 MILLIGRAM(S): at 15:28

## 2019-04-23 RX ADMIN — OXYCODONE HYDROCHLORIDE 5 MILLIGRAM(S): 5 TABLET ORAL at 19:22

## 2019-04-23 RX ADMIN — Medication 200 MILLIGRAM(S): at 11:31

## 2019-04-23 RX ADMIN — Medication 975 MILLIGRAM(S): at 18:01

## 2019-04-23 RX ADMIN — HEPARIN SODIUM 5000 UNIT(S): 5000 INJECTION INTRAVENOUS; SUBCUTANEOUS at 06:29

## 2019-04-23 RX ADMIN — Medication 325 MILLIGRAM(S): at 18:01

## 2019-04-23 RX ADMIN — OXYCODONE HYDROCHLORIDE 5 MILLIGRAM(S): 5 TABLET ORAL at 12:17

## 2019-04-23 RX ADMIN — Medication 975 MILLIGRAM(S): at 19:22

## 2019-04-23 RX ADMIN — FAMOTIDINE 20 MILLIGRAM(S): 10 INJECTION INTRAVENOUS at 07:46

## 2019-04-23 RX ADMIN — SIMETHICONE 80 MILLIGRAM(S): 80 TABLET, CHEWABLE ORAL at 18:02

## 2019-04-23 RX ADMIN — OXYCODONE HYDROCHLORIDE 5 MILLIGRAM(S): 5 TABLET ORAL at 10:00

## 2019-04-23 RX ADMIN — OXYCODONE HYDROCHLORIDE 5 MILLIGRAM(S): 5 TABLET ORAL at 16:00

## 2019-04-23 RX ADMIN — OXYCODONE HYDROCHLORIDE 5 MILLIGRAM(S): 5 TABLET ORAL at 22:00

## 2019-04-23 RX ADMIN — OXYCODONE HYDROCHLORIDE 5 MILLIGRAM(S): 5 TABLET ORAL at 09:46

## 2019-04-23 RX ADMIN — Medication 975 MILLIGRAM(S): at 00:30

## 2019-04-23 RX ADMIN — Medication 112 MICROGRAM(S): at 05:58

## 2019-04-23 RX ADMIN — Medication 500 MILLIGRAM(S): at 05:57

## 2019-04-23 RX ADMIN — ENOXAPARIN SODIUM 40 MILLIGRAM(S): 100 INJECTION SUBCUTANEOUS at 18:01

## 2019-04-23 RX ADMIN — Medication 30 MILLIGRAM(S): at 05:58

## 2019-04-23 RX ADMIN — Medication 975 MILLIGRAM(S): at 13:00

## 2019-04-23 RX ADMIN — OXYCODONE HYDROCHLORIDE 5 MILLIGRAM(S): 5 TABLET ORAL at 13:27

## 2019-04-23 RX ADMIN — SIMETHICONE 80 MILLIGRAM(S): 80 TABLET, CHEWABLE ORAL at 12:17

## 2019-04-23 RX ADMIN — Medication 30 MILLIGRAM(S): at 06:30

## 2019-04-23 RX ADMIN — Medication 975 MILLIGRAM(S): at 05:58

## 2019-04-23 RX ADMIN — Medication 975 MILLIGRAM(S): at 12:16

## 2019-04-23 RX ADMIN — Medication 600 MILLIGRAM(S): at 21:14

## 2019-04-23 RX ADMIN — Medication 100 MILLIGRAM(S): at 05:57

## 2019-04-23 RX ADMIN — Medication 325 MILLIGRAM(S): at 05:57

## 2019-04-23 NOTE — PROGRESS NOTE ADULT - SUBJECTIVE AND OBJECTIVE BOX
Day 2 of Anesthesia Pain Management Service    SUBJECTIVE: I'm okay  Pain Scale Score:    [X] Refer to charted pain scores    THERAPY: Epidural Bupivacaine 0.01 % and Fentanyl 3 micrograms/mL     Demand Dose: 3 mL  Lockout: 15 minutes   Continuous Rate:  12 mL    MEDICATIONS  (STANDING):  acetaminophen   Tablet .. 975 milliGRAM(s) Oral every 6 hours  ascorbic acid 500 milliGRAM(s) Oral two times a day  diphtheria/tetanus/pertussis (acellular) Vaccine (ADAcel) 0.5 milliLiter(s) IntraMuscular once  famotidine    Tablet 20 milliGRAM(s) Oral two times a day  ferrous    sulfate 325 milliGRAM(s) Oral two times a day  heparin  Injectable 5000 Unit(s) SubCutaneous every 12 hours  hydroxychloroquine 200 milliGRAM(s) Oral every 12 hours  ibuprofen  Tablet. 600 milliGRAM(s) Oral every 6 hours  ketorolac   Injectable 30 milliGRAM(s) IV Push every 6 hours  lactated ringers. 1000 milliLiter(s) (125 mL/Hr) IV Continuous <Continuous>  levothyroxine 112 MICROGram(s) Oral daily  oxyCODONE    IR 5 milliGRAM(s) Oral every 3 hours  oxytocin Infusion 41.667 milliUNIT(s)/Min (125 mL/Hr) IV Continuous <Continuous>  oxytocin Infusion 333.333 milliUNIT(s)/Min (1000 mL/Hr) IV Continuous <Continuous>  oxytocin Infusion 41.667 milliUNIT(s)/Min (125 mL/Hr) IV Continuous <Continuous>  prenatal multivitamin 1 Tablet(s) Oral daily    MEDICATIONS  (PRN):  diphenhydrAMINE 25 milliGRAM(s) Oral every 6 hours PRN Itching  docusate sodium 100 milliGRAM(s) Oral two times a day PRN Stool Softening  glycerin Suppository - Adult 1 Suppository(s) Rectal at bedtime PRN Constipation  lanolin Ointment 1 Application(s) Topical every 3 hours PRN Sore Nipples  oxyCODONE    IR 5 milliGRAM(s) Oral every 4 hours PRN Severe Pain (7 - 10)  simethicone 80 milliGRAM(s) Chew every 4 hours PRN Gas      OBJECTIVE:    Assessment of Epidural Catheter Site: 	    [ ] Dressing intact	[X] Site non-tender	[X] Site without erythema, discharge, edema  [ ] Epidural tubing and connection checked	[X] Gross neurological exam within normal limits  [X] Catheter removed    PT/INR - ( 21 Apr 2019 21:53 )   PT: 10.7 sec;   INR: 0.94 ratio         PTT - ( 21 Apr 2019 21:53 )  PTT:27.2 sec                      8.8    20.3  )-----------( 262      ( 22 Apr 2019 06:31 )             25.8     Vital Signs Last 24 Hrs  T(C): 36.8 (04-23-19 @ 05:41), Max: 37.2 (04-22-19 @ 12:41)  T(F): 98.2 (04-23-19 @ 05:41), Max: 98.9 (04-22-19 @ 12:41)  HR: 98 (04-23-19 @ 05:41) (94 - 110)  BP: 97/62 (04-23-19 @ 05:41) (97/57 - 113/76)  BP(mean): --  RR: 18 (04-23-19 @ 05:41) (17 - 18)  SpO2: 96% (04-23-19 @ 05:41) (96% - 99%)      Sedation Score:	[X] Alert	[ ] Drowsy	[ ] Arousable  [ ] Asleep  [ ] Unresponsive    Side Effects:	[X] None	[ ] Nausea	[ ] Vomiting  [ ] Pruritus  		[ ] Weakness  [ ] Numbness  [ ] Other:    ASSESSMENT/ PLAN:    Therapy:                         [ ] Continue   [X] Discontinue   [X] Change to PRN Analgesics    Documentation and Verification of current medications:  [X] Done	[ ] Not done, not eligible, reason:    Comments:

## 2019-04-23 NOTE — PROGRESS NOTE ADULT - SUBJECTIVE AND OBJECTIVE BOX
OB Progress Note:  Delivery, POD#2    S: 39yo POD#2 s/p LTCS . Patient reports multiple episodes of nausea yesterday. Reports upon getting up out of bed to walk around she feels lightheaded, and becomes nauseated. Denies any vomitting. Is still able to tolerate a regular diet and is passing flatus. Otherwise, her pain is well controlled. Denies CP/SOB/.   She is ambulating without difficulty.   Voiding spontaneously.     O:   Vital Signs Last 24 Hrs  T(C): 36.8 (2019 05:41), Max: 37.2 (2019 12:41)  T(F): 98.2 (2019 05:41), Max: 98.9 (2019 12:41)  HR: 98 (2019 05:41) (94 - 110)  BP: 97/62 (2019 05:41) (97/57 - 113/76)  BP(mean): --  RR: 18 (2019 05:41) (17 - 18)  SpO2: 96% (2019 05:41) (96% - 99%)    PE:  General: NAD  Abdomen: Mildly distended, appropriately tender, incision c/d/i, fundus firm.  Extremities: NTTP, no erythema, no pitting edema  Pelvic: Minimal lochia    Labs:  Blood type: B Positive  Rubella IgG: RPR: Negative                          8.8<L>   20.3<H> >-----------< 262    (  @ 06:31 )             25.8<L>                        9.5<L>   18.5<H> >-----------< 306    (  @ 20:59 )             27.0<L>                        10.7<L>   13.4<H> >-----------< 339    (  @ 14:31 )             31.1<L>    19 @ 20:59      135  |  106  |  4<L>  ----------------------------<  103<H>  4.0   |  18<L>  |  <0.30<L>        Ca    8.8      2019 20:59

## 2019-04-23 NOTE — CHART NOTE - NSCHARTNOTEFT_GEN_A_CORE
Nutrition Follow Up Note  Patient seen for: Nutrition consult for postpartum h/o GDM.    Pt is a 37 y/o  s/p cesarian delivery at 33.3 weeks gestation after 4th vaginal bleed form placenta previa.    Source: patient, comprehensive chart review, RN    Diet : regular    Patient reports: nausea and dizziness, team aware. Had a BM this morning. Reports fair appetite. Plans on breast pumping for  male.     PO intake : 50-75%     Source for PO intake: patient    No new Wt to address.    Pertinent Medications: MEDICATIONS  (STANDING):  acetaminophen   Tablet .. 975 milliGRAM(s) Oral every 6 hours  ascorbic acid 500 milliGRAM(s) Oral two times a day  diphtheria/tetanus/pertussis (acellular) Vaccine (ADAcel) 0.5 milliLiter(s) IntraMuscular once  enoxaparin Injectable 40 milliGRAM(s) SubCutaneous daily  famotidine    Tablet 20 milliGRAM(s) Oral two times a day  ferrous    sulfate 325 milliGRAM(s) Oral two times a day  hydroxychloroquine 200 milliGRAM(s) Oral every 12 hours  ibuprofen  Tablet. 600 milliGRAM(s) Oral every 6 hours  ketorolac   Injectable 30 milliGRAM(s) IV Push every 6 hours  lactated ringers. 1000 milliLiter(s) (125 mL/Hr) IV Continuous <Continuous>  levothyroxine 112 MICROGram(s) Oral daily  oxyCODONE    IR 5 milliGRAM(s) Oral every 3 hours  oxytocin Infusion 41.667 milliUNIT(s)/Min (125 mL/Hr) IV Continuous <Continuous>  oxytocin Infusion 333.333 milliUNIT(s)/Min (1000 mL/Hr) IV Continuous <Continuous>  oxytocin Infusion 41.667 milliUNIT(s)/Min (125 mL/Hr) IV Continuous <Continuous>  prenatal multivitamin 1 Tablet(s) Oral daily    MEDICATIONS  (PRN):  diphenhydrAMINE 25 milliGRAM(s) Oral every 6 hours PRN Itching  docusate sodium 100 milliGRAM(s) Oral two times a day PRN Stool Softening  glycerin Suppository - Adult 1 Suppository(s) Rectal at bedtime PRN Constipation  lanolin Ointment 1 Application(s) Topical every 3 hours PRN Sore Nipples  ondansetron    Tablet 4 milliGRAM(s) Oral once PRN Nausea and/or Vomiting  oxyCODONE    IR 5 milliGRAM(s) Oral every 4 hours PRN Severe Pain (7 - 10)  simethicone 80 milliGRAM(s) Chew every 4 hours PRN Gas    Pertinent Labs: N/A      Skin per nursing documentation: surgical incision noted.  Edema: none noted.    Estimated Needs:   [s] no change since previous assessment  [ ] recalculated:     Previous Nutrition Diagnosis: increased nutrient needs  Nutrition Diagnosis is: ongoing, Pt plans on breastfeeding     New Nutrition Diagnosis: Food and Nutrition-related knowledge deficit  Related to: Lack of prior nutrition-related education regarding postpartum recommendations   As evidenced by: h/o of GDM now s/p delivery with questions on how to decrease risk of developing T2DM      Interventions:     Recommend  Encourage po intake w/ snacks ordered at meals.   Provide food preferences as feasible.   Pt educated on postpartum dietary recommendations including: risk of development of T2DM, ways to reduce risk of developing T2DM and reinforced importance of DM screening 4-12 weeks postpartum. Pt verbalized good understanding. Written materials left at bedside.   continue prenatal multivitamin     Monitoring and Evaluation:     Continue to monitor Nutritional intake, Tolerance to diet prescription, weights, labs, skin integrity    RD remains available upon request and will follow up per protocol      Riley Huntley RD, CDN, CDE. Pager: [phone]

## 2019-04-23 NOTE — PROGRESS NOTE ADULT - ATTENDING COMMENTS
ATTG Note    Postoperative day #2  36.4  88  102/70  18    Abdomen soft and nontender  Incision clean dry and intact  Extremities no pain  Lochia within normal limits    19.8/7.6/22.1/276 ()    Post operative day #2 status post  delivery at 33-3/7 weeks for complete previa    Postoperative anemia - options for possible transfusion were discussed with patient    Lupus - on plaquenil 200 mg p.o. b.i.d. (previously on baby aspirin q.d.)  Hypothyroidism on Synthroid 112  Antiphospholipid syndrome - Lovenox 40 mg q.d.  GDM A2 previously on NPH q.h.s.    Continue routine postoperative care

## 2019-04-24 LAB
BASOPHILS # BLD AUTO: 0.1 K/UL — SIGNIFICANT CHANGE UP (ref 0–0.2)
EOSINOPHIL # BLD AUTO: 0.2 K/UL — SIGNIFICANT CHANGE UP (ref 0–0.5)
EOSINOPHIL NFR BLD AUTO: 3 % — SIGNIFICANT CHANGE UP (ref 0–6)
HCT VFR BLD CALC: 29.1 % — LOW (ref 34.5–45)
HGB BLD-MCNC: 9.8 G/DL — LOW (ref 11.5–15.5)
LYMPHOCYTES # BLD AUTO: 11 % — LOW (ref 13–44)
LYMPHOCYTES # BLD AUTO: 3.2 K/UL — SIGNIFICANT CHANGE UP (ref 1–3.3)
MCHC RBC-ENTMCNC: 23.5 PG — LOW (ref 27–34)
MCHC RBC-ENTMCNC: 33.8 GM/DL — SIGNIFICANT CHANGE UP (ref 32–36)
MCV RBC AUTO: 69.3 FL — LOW (ref 80–100)
MONOCYTES # BLD AUTO: 0.6 K/UL — SIGNIFICANT CHANGE UP (ref 0–0.9)
MONOCYTES NFR BLD AUTO: 1 % — LOW (ref 2–14)
NEUTROPHILS # BLD AUTO: 11.9 K/UL — HIGH (ref 1.8–7.4)
NEUTROPHILS NFR BLD AUTO: 80 % — HIGH (ref 43–77)
PLATELET # BLD AUTO: 353 K/UL — SIGNIFICANT CHANGE UP (ref 150–400)
RBC # BLD: 4.2 M/UL — SIGNIFICANT CHANGE UP (ref 3.8–5.2)
RBC # FLD: 16.4 % — HIGH (ref 10.3–14.5)
WBC # BLD: 15.9 K/UL — HIGH (ref 3.8–10.5)
WBC # FLD AUTO: 15.9 K/UL — HIGH (ref 3.8–10.5)

## 2019-04-24 RX ADMIN — Medication 600 MILLIGRAM(S): at 06:27

## 2019-04-24 RX ADMIN — Medication 975 MILLIGRAM(S): at 15:07

## 2019-04-24 RX ADMIN — OXYCODONE HYDROCHLORIDE 5 MILLIGRAM(S): 5 TABLET ORAL at 12:40

## 2019-04-24 RX ADMIN — Medication 600 MILLIGRAM(S): at 19:03

## 2019-04-24 RX ADMIN — OXYCODONE HYDROCHLORIDE 5 MILLIGRAM(S): 5 TABLET ORAL at 13:20

## 2019-04-24 RX ADMIN — Medication 600 MILLIGRAM(S): at 18:33

## 2019-04-24 RX ADMIN — Medication 325 MILLIGRAM(S): at 18:34

## 2019-04-24 RX ADMIN — Medication 200 MILLIGRAM(S): at 12:37

## 2019-04-24 RX ADMIN — OXYCODONE HYDROCHLORIDE 5 MILLIGRAM(S): 5 TABLET ORAL at 19:03

## 2019-04-24 RX ADMIN — OXYCODONE HYDROCHLORIDE 5 MILLIGRAM(S): 5 TABLET ORAL at 21:00

## 2019-04-24 RX ADMIN — Medication 600 MILLIGRAM(S): at 05:13

## 2019-04-24 RX ADMIN — Medication 975 MILLIGRAM(S): at 01:00

## 2019-04-24 RX ADMIN — OXYCODONE HYDROCHLORIDE 5 MILLIGRAM(S): 5 TABLET ORAL at 09:14

## 2019-04-24 RX ADMIN — Medication 100 MILLIGRAM(S): at 18:34

## 2019-04-24 RX ADMIN — OXYCODONE HYDROCHLORIDE 5 MILLIGRAM(S): 5 TABLET ORAL at 06:27

## 2019-04-24 RX ADMIN — Medication 600 MILLIGRAM(S): at 13:20

## 2019-04-24 RX ADMIN — Medication 975 MILLIGRAM(S): at 21:00

## 2019-04-24 RX ADMIN — OXYCODONE HYDROCHLORIDE 5 MILLIGRAM(S): 5 TABLET ORAL at 15:37

## 2019-04-24 RX ADMIN — Medication 975 MILLIGRAM(S): at 05:13

## 2019-04-24 RX ADMIN — Medication 500 MILLIGRAM(S): at 18:35

## 2019-04-24 RX ADMIN — OXYCODONE HYDROCHLORIDE 5 MILLIGRAM(S): 5 TABLET ORAL at 00:23

## 2019-04-24 RX ADMIN — Medication 600 MILLIGRAM(S): at 00:23

## 2019-04-24 RX ADMIN — Medication 1 TABLET(S): at 12:38

## 2019-04-24 RX ADMIN — Medication 600 MILLIGRAM(S): at 12:37

## 2019-04-24 RX ADMIN — Medication 600 MILLIGRAM(S): at 01:00

## 2019-04-24 RX ADMIN — Medication 200 MILLIGRAM(S): at 21:01

## 2019-04-24 RX ADMIN — OXYCODONE HYDROCHLORIDE 5 MILLIGRAM(S): 5 TABLET ORAL at 05:13

## 2019-04-24 RX ADMIN — ENOXAPARIN SODIUM 40 MILLIGRAM(S): 100 INJECTION SUBCUTANEOUS at 18:36

## 2019-04-24 RX ADMIN — Medication 325 MILLIGRAM(S): at 05:13

## 2019-04-24 RX ADMIN — OXYCODONE HYDROCHLORIDE 5 MILLIGRAM(S): 5 TABLET ORAL at 08:44

## 2019-04-24 RX ADMIN — Medication 500 MILLIGRAM(S): at 05:13

## 2019-04-24 RX ADMIN — Medication 975 MILLIGRAM(S): at 15:37

## 2019-04-24 RX ADMIN — Medication 112 MICROGRAM(S): at 05:13

## 2019-04-24 RX ADMIN — OXYCODONE HYDROCHLORIDE 5 MILLIGRAM(S): 5 TABLET ORAL at 01:00

## 2019-04-24 RX ADMIN — Medication 975 MILLIGRAM(S): at 00:23

## 2019-04-24 RX ADMIN — OXYCODONE HYDROCHLORIDE 5 MILLIGRAM(S): 5 TABLET ORAL at 15:06

## 2019-04-24 RX ADMIN — OXYCODONE HYDROCHLORIDE 5 MILLIGRAM(S): 5 TABLET ORAL at 21:30

## 2019-04-24 RX ADMIN — Medication 975 MILLIGRAM(S): at 21:30

## 2019-04-24 RX ADMIN — OXYCODONE HYDROCHLORIDE 5 MILLIGRAM(S): 5 TABLET ORAL at 18:34

## 2019-04-24 NOTE — PROGRESS NOTE ADULT - ATTENDING COMMENTS
Pt seen and evaluated.  Stable POD#3 s/p 33+ wk mid transverse C/S for bleeding placenta previa.  Pt feels better s/p 1U PRBC's last night for symptomatic anemia  Check CBC  Routine PP Care  D/C planning for tomorrow.

## 2019-04-24 NOTE — PROGRESS NOTE ADULT - SUBJECTIVE AND OBJECTIVE BOX
OB Progress Note:  Delivery, POD#3    S: 37yo POD#3 s/p MTCS for VB in the setting of placenta previa c/b GDMA2 and maternal APLS, SLE, and endometriosis. EBL 1200. Is s/p 2upRBCs. States she is feeling much improved from yesterday. Nausea has since diminished and lightheadedness has resolved completely. Her pain is well controlled. She is tolerating a regular diet and passing flatus. Denies N/V. Denies CP/SOB/lightheadedness/dizziness.   She is ambulating without difficulty.   Voiding spontaneously.     O:   Vital Signs Last 24 Hrs  T(C): 36.7 (2019 04:45), Max: 36.8 (2019 13:53)  T(F): 98.1 (2019 04:45), Max: 98.2 (2019 13:53)  HR: 78 (2019 04:45) (78 - 88)  BP: 100/65 (2019 04:45) (95/66 - 108/77)  BP(mean): --  RR: 16 (2019 04:45) (16 - 18)  SpO2: 99% (2019 22:07) (97% - 99%)      PE:  General: NAD  Abdomen: Mildly distended, appropriately tender, incision c/d/i, fundus firm.  Extremities: NTTP, no erythema, no pitting edema  Pelvic: moderate lochia    Labs:  Blood type: B Positive  Rubella IgG: RPR: Negative                          9.1<L>   18.3<H> >-----------< 330    (  @ 23:05 )             26.2<L>                        7.6<L>   19.8<H> >-----------< 276    (  @ 08:55 )             22.1<L>                        8.8<L>   20.3<H> >-----------< 262    (  @ 06:31 )             25.8<L>                        9.5<L>   18.5<H> >-----------< 306    (  @ 20:59 )             27.0<L>                        10.7<L>   13.4<H> >-----------< 339    (  @ 14:31 )             31.1<L>    19 @ 20:59      135  |  106  |  4<L>  ----------------------------<  103<H>  4.0   |  18<L>  |  <0.30<L>        Ca    8.8      2019 20:59

## 2019-04-25 ENCOUNTER — TRANSCRIPTION ENCOUNTER (OUTPATIENT)
Age: 39
End: 2019-04-25

## 2019-04-25 VITALS
TEMPERATURE: 98 F | RESPIRATION RATE: 18 BRPM | OXYGEN SATURATION: 98 % | DIASTOLIC BLOOD PRESSURE: 70 MMHG | SYSTOLIC BLOOD PRESSURE: 104 MMHG | HEART RATE: 62 BPM

## 2019-04-25 DIAGNOSIS — O99.810 ABNORMAL GLUCOSE COMPLICATING PREGNANCY: ICD-10-CM

## 2019-04-25 DIAGNOSIS — Z34.90 ENCOUNTER FOR SUPERVISION OF NORMAL PREGNANCY, UNSPECIFIED, UNSPECIFIED TRIMESTER: ICD-10-CM

## 2019-04-25 DIAGNOSIS — Z87.440 PERSONAL HISTORY OF URINARY (TRACT) INFECTIONS: ICD-10-CM

## 2019-04-25 PROCEDURE — 86780 TREPONEMA PALLIDUM: CPT

## 2019-04-25 PROCEDURE — 85730 THROMBOPLASTIN TIME PARTIAL: CPT

## 2019-04-25 PROCEDURE — 59025 FETAL NON-STRESS TEST: CPT

## 2019-04-25 PROCEDURE — 90715 TDAP VACCINE 7 YRS/> IM: CPT

## 2019-04-25 PROCEDURE — 80048 BASIC METABOLIC PNL TOTAL CA: CPT

## 2019-04-25 PROCEDURE — P9016: CPT

## 2019-04-25 PROCEDURE — C1889: CPT

## 2019-04-25 PROCEDURE — 85027 COMPLETE CBC AUTOMATED: CPT

## 2019-04-25 PROCEDURE — 86850 RBC ANTIBODY SCREEN: CPT

## 2019-04-25 PROCEDURE — G0463: CPT

## 2019-04-25 PROCEDURE — 88307 TISSUE EXAM BY PATHOLOGIST: CPT

## 2019-04-25 PROCEDURE — 81003 URINALYSIS AUTO W/O SCOPE: CPT

## 2019-04-25 PROCEDURE — 82962 GLUCOSE BLOOD TEST: CPT

## 2019-04-25 PROCEDURE — 85610 PROTHROMBIN TIME: CPT

## 2019-04-25 PROCEDURE — 85384 FIBRINOGEN ACTIVITY: CPT

## 2019-04-25 PROCEDURE — 86923 COMPATIBILITY TEST ELECTRIC: CPT

## 2019-04-25 PROCEDURE — 59050 FETAL MONITOR W/REPORT: CPT

## 2019-04-25 PROCEDURE — 86900 BLOOD TYPING SEROLOGIC ABO: CPT

## 2019-04-25 PROCEDURE — 36430 TRANSFUSION BLD/BLD COMPNT: CPT

## 2019-04-25 PROCEDURE — 86901 BLOOD TYPING SEROLOGIC RH(D): CPT

## 2019-04-25 RX ORDER — FERROUS SULFATE 325(65) MG
1 TABLET ORAL
Qty: 0 | Refills: 0 | DISCHARGE
Start: 2019-04-25

## 2019-04-25 RX ORDER — LEVOTHYROXINE SODIUM 125 MCG
1 TABLET ORAL
Qty: 0 | Refills: 0 | DISCHARGE
Start: 2019-04-25

## 2019-04-25 RX ORDER — ASPIRIN/CALCIUM CARB/MAGNESIUM 324 MG
0 TABLET ORAL
Qty: 0 | Refills: 0 | DISCHARGE
Start: 2019-04-25

## 2019-04-25 RX ORDER — ACETAMINOPHEN 500 MG
3 TABLET ORAL
Qty: 0 | Refills: 0 | DISCHARGE
Start: 2019-04-25

## 2019-04-25 RX ORDER — HYDROXYCHLOROQUINE SULFATE 200 MG
0 TABLET ORAL
Qty: 0 | Refills: 0 | COMMUNITY

## 2019-04-25 RX ORDER — DOCUSATE SODIUM 100 MG
1 CAPSULE ORAL
Qty: 0 | Refills: 0 | DISCHARGE
Start: 2019-04-25

## 2019-04-25 RX ORDER — ONDANSETRON 8 MG/1
4 TABLET, FILM COATED ORAL ONCE
Qty: 0 | Refills: 0 | Status: COMPLETED | OUTPATIENT
Start: 2019-04-25 | End: 2019-04-25

## 2019-04-25 RX ORDER — ASCORBIC ACID 60 MG
2 TABLET,CHEWABLE ORAL
Qty: 0 | Refills: 0 | DISCHARGE
Start: 2019-04-25

## 2019-04-25 RX ORDER — HYDROXYCHLOROQUINE SULFATE 200 MG
1 TABLET ORAL
Qty: 0 | Refills: 0 | DISCHARGE
Start: 2019-04-25

## 2019-04-25 RX ORDER — IBUPROFEN 200 MG
3 TABLET ORAL
Qty: 0 | Refills: 0 | DISCHARGE
Start: 2019-04-25

## 2019-04-25 RX ORDER — TETANUS TOXOID, REDUCED DIPHTHERIA TOXOID AND ACELLULAR PERTUSSIS VACCINE, ADSORBED 5; 2.5; 8; 8; 2.5 [IU]/.5ML; [IU]/.5ML; UG/.5ML; UG/.5ML; UG/.5ML
0.5 SUSPENSION INTRAMUSCULAR ONCE
Qty: 0 | Refills: 0 | Status: COMPLETED | OUTPATIENT
Start: 2019-04-25 | End: 2019-04-25

## 2019-04-25 RX ORDER — OXYCODONE HYDROCHLORIDE 5 MG/1
1 TABLET ORAL
Qty: 0 | Refills: 0 | DISCHARGE
Start: 2019-04-25

## 2019-04-25 RX ADMIN — Medication 500 MILLIGRAM(S): at 19:26

## 2019-04-25 RX ADMIN — Medication 325 MILLIGRAM(S): at 19:26

## 2019-04-25 RX ADMIN — OXYCODONE HYDROCHLORIDE 5 MILLIGRAM(S): 5 TABLET ORAL at 06:55

## 2019-04-25 RX ADMIN — Medication 600 MILLIGRAM(S): at 00:39

## 2019-04-25 RX ADMIN — Medication 200 MILLIGRAM(S): at 20:51

## 2019-04-25 RX ADMIN — Medication 500 MILLIGRAM(S): at 06:55

## 2019-04-25 RX ADMIN — Medication 975 MILLIGRAM(S): at 13:12

## 2019-04-25 RX ADMIN — Medication 600 MILLIGRAM(S): at 01:09

## 2019-04-25 RX ADMIN — Medication 975 MILLIGRAM(S): at 12:42

## 2019-04-25 RX ADMIN — Medication 600 MILLIGRAM(S): at 16:20

## 2019-04-25 RX ADMIN — Medication 975 MILLIGRAM(S): at 06:54

## 2019-04-25 RX ADMIN — Medication 325 MILLIGRAM(S): at 06:55

## 2019-04-25 RX ADMIN — ONDANSETRON 4 MILLIGRAM(S): 8 TABLET, FILM COATED ORAL at 10:40

## 2019-04-25 RX ADMIN — Medication 975 MILLIGRAM(S): at 19:56

## 2019-04-25 RX ADMIN — Medication 600 MILLIGRAM(S): at 11:20

## 2019-04-25 RX ADMIN — Medication 600 MILLIGRAM(S): at 15:51

## 2019-04-25 RX ADMIN — OXYCODONE HYDROCHLORIDE 5 MILLIGRAM(S): 5 TABLET ORAL at 01:09

## 2019-04-25 RX ADMIN — Medication 112 MICROGRAM(S): at 06:57

## 2019-04-25 RX ADMIN — Medication 975 MILLIGRAM(S): at 19:26

## 2019-04-25 RX ADMIN — Medication 600 MILLIGRAM(S): at 21:21

## 2019-04-25 RX ADMIN — TETANUS TOXOID, REDUCED DIPHTHERIA TOXOID AND ACELLULAR PERTUSSIS VACCINE, ADSORBED 0.5 MILLILITER(S): 5; 2.5; 8; 8; 2.5 SUSPENSION INTRAMUSCULAR at 06:55

## 2019-04-25 RX ADMIN — ENOXAPARIN SODIUM 40 MILLIGRAM(S): 100 INJECTION SUBCUTANEOUS at 19:27

## 2019-04-25 RX ADMIN — Medication 600 MILLIGRAM(S): at 20:51

## 2019-04-25 RX ADMIN — FAMOTIDINE 20 MILLIGRAM(S): 10 INJECTION INTRAVENOUS at 19:30

## 2019-04-25 RX ADMIN — Medication 200 MILLIGRAM(S): at 10:41

## 2019-04-25 RX ADMIN — Medication 100 MILLIGRAM(S): at 19:27

## 2019-04-25 RX ADMIN — OXYCODONE HYDROCHLORIDE 5 MILLIGRAM(S): 5 TABLET ORAL at 00:39

## 2019-04-25 RX ADMIN — Medication 600 MILLIGRAM(S): at 10:41

## 2019-04-25 RX ADMIN — FAMOTIDINE 20 MILLIGRAM(S): 10 INJECTION INTRAVENOUS at 10:40

## 2019-04-25 NOTE — DISCHARGE NOTE OB - CARE PROVIDER_API CALL
Santo Calderon)  Obstetrics and Gynecology  [address]  [place] [address]  Phone: [phone]  Fax: [phone]  Follow Up Time:

## 2019-04-25 NOTE — PROGRESS NOTE ADULT - ASSESSMENT
A/P: 39yo POD#3 s/p pMTCS with midline incision due to previa, EBL 1200 s/p 1upRBC. Patient doing well post-operatively.
A/P: 37yo POD#2 s/p LTCS HTCS at 33-3 for placenta previa in the setting of vaginal bleeding, c/b maternal hx of endometriosis, SLE, APLS, and hypothyroidism. [id number]. Patient is stable and doing well post-operatively.
A/P: 37yo POD#3 s/p MTCS c/b GDMA2 and maternal APLS, SLE, hypothyroidism and endometriosis.  Patient is stable and doing well post-operatively.
A/P: 39yo POD#2 s/p LTCS c/b EBL 1200 and maternal history of SLE.  Postoperative course complicated by nausea and lightheadedness

## 2019-04-25 NOTE — DISCHARGE NOTE OB - HOSPITAL COURSE
Patient admitted at 32 weeks with known placenta previa. Patient had bleeding on 4/21 and was delivered. She was anemic post partum and received one unit of PRB cells. Her The patient's hemoglobin and hematocrit were stable . She is discharged home on lovenox secondary to lupus/APA syndrome. She will follow up with Dr. Calderon in 2 weeks.

## 2019-04-25 NOTE — DISCHARGE NOTE OB - MEDICATION SUMMARY - MEDICATIONS TO STOP TAKING
I will STOP taking the medications listed below when I get home from the hospital:    metoclopramide 10 mg oral tablet  -- 1 tab(s) by mouth every 6 hours  -- It is very important that you take or use this exactly as directed.  Do not skip doses or discontinue unless directed by your doctor.  May cause drowsiness.  Alcohol may intensify this effect.  Use care when operating dangerous machinery.  Take medication on an empty stomach 1 hour before or 2 to 3 hours after a meal unless otherwise directed by your doctor.    acetaminophen 325 mg oral tablet  -- 3 tab(s) by mouth every 6 hours, As needed, Moderate Pain (4 - 6)    doxylamine-pyridoxine 10 mg-10 mg oral delayed release tablet  -- 1 tab(s) by mouth once a day (at bedtime)    aspirin 81 mg oral delayed release tablet  -- 1 tab(s) by mouth once a day    insulin isophane (NPH) 100 units/mL human recombinant subcutaneous suspension  -- 10 unit(s) subcutaneous once a day (at bedtime)

## 2019-04-25 NOTE — CHART NOTE - NSCHARTNOTEFT_GEN_A_CORE
Patient evaluated bedside for nausea and lightheadedness. Otherwise doing well. Denies HA, blurring vision, vomiting, CP, SOB. Passing flatus and  tolerating regular diet. Voiding spontaneously.    VS  T(C): 36.7 (19 @ 08:49)  HR: 68 (19 @ 08:49)  BP: 119/81 (19 @ 08:49)  RR: 18 (19 @ 08:49)  SpO2: 96% (19 @ 08:49)    Physical Exam:  General: NAD  CV: S1/S2+, RRR  Lungs: CTA Jose L  Abdomen: Soft, NTTP, BS+  Incision: CDI  Ext: No pain or swelling    A&P: Patient is a 37yo  POD#4 s/p pMTCS c/o nausea and lightheadedness. VSS and wnl. Is tolerating regular diet and passing flatus. PE benign. For zofran. Encourage PO hydration and increased PO ingestion. For discharge planning.     Linnea Power, PGY1  D/W Dr. Cedillo

## 2019-04-25 NOTE — PROGRESS NOTE ADULT - SUBJECTIVE AND OBJECTIVE BOX
OB Postpartum Note:  Delivery, POD#4    S: 37yo POD#4 s/p pMTCS, midline incision. Baby is still in NICU. Her pain is well controlled. She is tolerating a regular diet and passing flatus. Voiding spontaneously. Denies N/V. Denies CP/SOB/lightheadedness/dizziness.     O:   Vitals:  Vital Signs Last 24 Hrs  T(C): 36.7 (2019 04:53), Max: 36.9 (2019 17:44)  T(F): 98.1 (2019 04:53), Max: 98.4 (2019 17:44)  HR: 69 (2019 04:53) (69 - 81)  BP: 126/82 (2019 04:53) (91/61 - 126/84)  BP(mean): --  RR: 18 (2019 04:53) (16 - 18)  SpO2: 98% (2019 04:53) (96% - 99%)    MEDICATIONS  (STANDING):  acetaminophen   Tablet .. 975 milliGRAM(s) Oral every 6 hours  ascorbic acid 500 milliGRAM(s) Oral two times a day  enoxaparin Injectable 40 milliGRAM(s) SubCutaneous daily  famotidine    Tablet 20 milliGRAM(s) Oral two times a day  ferrous    sulfate 325 milliGRAM(s) Oral two times a day  hydroxychloroquine 200 milliGRAM(s) Oral every 12 hours  ibuprofen  Tablet. 600 milliGRAM(s) Oral every 6 hours  lactated ringers. 1000 milliLiter(s) (125 mL/Hr) IV Continuous <Continuous>  levothyroxine 112 MICROGram(s) Oral daily  oxyCODONE    IR 5 milliGRAM(s) Oral every 3 hours  oxytocin Infusion 41.667 milliUNIT(s)/Min (125 mL/Hr) IV Continuous <Continuous>  oxytocin Infusion 333.333 milliUNIT(s)/Min (1000 mL/Hr) IV Continuous <Continuous>  oxytocin Infusion 41.667 milliUNIT(s)/Min (125 mL/Hr) IV Continuous <Continuous>  prenatal multivitamin 1 Tablet(s) Oral daily    MEDICATIONS  (PRN):  diphenhydrAMINE 25 milliGRAM(s) Oral every 6 hours PRN Itching  docusate sodium 100 milliGRAM(s) Oral two times a day PRN Stool Softening  glycerin Suppository - Adult 1 Suppository(s) Rectal at bedtime PRN Constipation  lanolin Ointment 1 Application(s) Topical every 3 hours PRN Sore Nipples  oxyCODONE    IR 5 milliGRAM(s) Oral every 4 hours PRN Severe Pain (7 - 10)  simethicone 80 milliGRAM(s) Chew every 4 hours PRN Gas      Labs:  Blood type: B Positive  Rubella IgG: RPR: Negative                          9.8<L>   15.9<H> >-----------< 353    (  @ 13:18 )             29.1<L>                        9.1<L>   18.3<H> >-----------< 330    (  @ 23:05 )             26.2<L>                        7.6<L>   19.8<H> >-----------< 276    (  @ 08:55 )             22.1<L>          PE:  General: NAD  Abdomen: mildly distended,appropriately tender, incision c/d/i.  Extremities: SCDs in place, no erythema

## 2019-04-25 NOTE — PROGRESS NOTE ADULT - PROBLEM SELECTOR PLAN 1
- H/o APLS: c/w Lovenox 40mg QD x6 weeks   - H/o SLE: c/w Plaquinil 200 BID  - Continue regular diet.  - Increase ambulation.  - Continue motrin, tylenol, oxycodone PRN for pain control.  - Discharge planning.
-Am cbc  -Continue lovenox 40qd x 6wks postpartum  -Continue plaquinil and synthroid  - Continue regular diet.  - Increase ambulation.  - Continue motrin, tylenol, oxycodone PRN for pain control.   -Discharge planning    Linnea Power PGY-1
-Continue plaquenil.  -Continue lovenox for 6 weeks postpartum  - VSS and wnl. Orthostatics negative this am.  - Given elevated EBL, likely behind in fluid repletion v. anemia secondary to blood loss. Will repeat cbc this am.  - Continue regular diet.  - Increase ambulation.  - D/C PCEA. For motrin, tylenol, oxycodone PRN for pain control.     Linnea Power PGY-1
-Continue plaquinil for SLE  -COntinue HSQ    - For regular diet.  - Increase ambulation.  - D/C PCEA. For motrin, tylenol, oxycodone PRN for pain control.     Linnea Power PGY-1

## 2019-04-25 NOTE — DISCHARGE NOTE OB - CARE PLAN
Principal Discharge DX:	 delivery delivered  Goal:	rest and recuperation  Assessment and plan of treatment:	Return to office in 2 weeks

## 2019-04-25 NOTE — DISCHARGE NOTE OB - MEDICATION SUMMARY - MEDICATIONS TO TAKE
I will START or STAY ON the medications listed below when I get home from the hospital:    oxyCODONE 5 mg oral tablet  -- 1 tab(s) by mouth every 3 hours  -- Indication: For pain     enoxaparin  -- 40 unit(s) intramuscular once a day  -- Indication: For lupus I will START or STAY ON the medications listed below when I get home from the hospital:    oxyCODONE 5 mg oral tablet  -- 1 tab(s) by mouth every 3 hours  -- Indication: For pain     aspirin 81 mg oral delayed release tablet  --  by mouth   -- Indication: For lupus    acetaminophen 325 mg oral tablet  -- 3 tab(s) by mouth every 6 hours, As Needed  -- Indication: For pain    ibuprofen 200 mg oral tablet  -- 3 tab(s) by mouth every 6 hours, As Needed  -- Indication: For pain    enoxaparin  -- 40 unit(s) intramuscular once a day  -- Indication: For lupus     hydroxychloroquine 200 mg oral tablet  -- 1 tab(s) by mouth every 12 hours  -- Indication: For lupus    ferrous sulfate 325 mg (65 mg elemental iron) oral tablet  -- 1 tab(s) by mouth 3 times a day  -- Indication: For anemia    Prenatal Multivitamins with Folic Acid 1 mg oral tablet  -- 1 tab(s) by mouth once a day  -- Indication: For vitamin    docusate sodium 100 mg oral capsule  -- 1 cap(s) by mouth 2 times a day  -- Indication: For Stool sofenter    levothyroxine 112 mcg (0.112 mg) oral tablet  -- 1 tab(s) by mouth once a day  -- Indication: For hypothyroidism    ascorbic acid 500 mg oral tablet  -- 2 tab(s) by mouth once a day  -- Indication: For vitamin

## 2019-04-25 NOTE — DISCHARGE NOTE OB - PATIENT PORTAL LINK FT
You can access the [facility] Patient Portal, offered by [facility], by registering with the following website: [email or web address]

## 2019-04-28 ENCOUNTER — EMERGENCY (EMERGENCY)
Facility: HOSPITAL | Age: 39
LOS: 1 days | Discharge: HOME | End: 2019-04-28
Attending: EMERGENCY MEDICINE
Payer: COMMERCIAL

## 2019-04-28 VITALS
WEIGHT: 149.91 LBS | OXYGEN SATURATION: 98 % | TEMPERATURE: 98 F | RESPIRATION RATE: 18 BRPM | DIASTOLIC BLOOD PRESSURE: 81 MMHG | HEART RATE: 77 BPM | SYSTOLIC BLOOD PRESSURE: 119 MMHG

## 2019-04-28 VITALS
TEMPERATURE: 98 F | HEART RATE: 77 BPM | SYSTOLIC BLOOD PRESSURE: 108 MMHG | OXYGEN SATURATION: 100 % | RESPIRATION RATE: 18 BRPM | DIASTOLIC BLOOD PRESSURE: 70 MMHG

## 2019-04-28 DIAGNOSIS — Z98.890 OTHER SPECIFIED POSTPROCEDURAL STATES: ICD-10-CM

## 2019-04-28 DIAGNOSIS — Z90.722 ACQUIRED ABSENCE OF OVARIES, BILATERAL: Chronic | ICD-10-CM

## 2019-04-28 DIAGNOSIS — Z98.890 OTHER SPECIFIED POSTPROCEDURAL STATES: Chronic | ICD-10-CM

## 2019-04-28 DIAGNOSIS — Z90.49 ACQUIRED ABSENCE OF OTHER SPECIFIED PARTS OF DIGESTIVE TRACT: Chronic | ICD-10-CM

## 2019-04-28 LAB
ALBUMIN SERPL ELPH-MCNC: 3.2 G/DL — LOW (ref 3.3–5)
ALP SERPL-CCNC: 91 U/L — SIGNIFICANT CHANGE UP (ref 40–120)
ALT FLD-CCNC: 12 U/L — SIGNIFICANT CHANGE UP (ref 10–45)
ANION GAP SERPL CALC-SCNC: 13 MMOL/L — SIGNIFICANT CHANGE UP (ref 5–17)
ANISOCYTOSIS BLD QL: SLIGHT — SIGNIFICANT CHANGE UP
APTT BLD: 27.9 SEC — SIGNIFICANT CHANGE UP (ref 27.5–36.3)
AST SERPL-CCNC: 16 U/L — SIGNIFICANT CHANGE UP (ref 10–40)
BASE EXCESS BLDV CALC-SCNC: -0.9 MMOL/L — SIGNIFICANT CHANGE UP (ref -2–2)
BASOPHILS # BLD AUTO: 0.1 K/UL — SIGNIFICANT CHANGE UP (ref 0–0.2)
BILIRUB SERPL-MCNC: 0.2 MG/DL — SIGNIFICANT CHANGE UP (ref 0.2–1.2)
BUN SERPL-MCNC: 6 MG/DL — LOW (ref 7–23)
CA-I SERPL-SCNC: 1.3 MMOL/L — SIGNIFICANT CHANGE UP (ref 1.12–1.3)
CALCIUM SERPL-MCNC: 9.2 MG/DL — SIGNIFICANT CHANGE UP (ref 8.4–10.5)
CHLORIDE BLDV-SCNC: 109 MMOL/L — HIGH (ref 96–108)
CHLORIDE SERPL-SCNC: 108 MMOL/L — SIGNIFICANT CHANGE UP (ref 96–108)
CO2 BLDV-SCNC: 24 MMOL/L — SIGNIFICANT CHANGE UP (ref 22–30)
CO2 SERPL-SCNC: 19 MMOL/L — LOW (ref 22–31)
CREAT SERPL-MCNC: 0.31 MG/DL — LOW (ref 0.5–1.3)
EOSINOPHIL # BLD AUTO: 0.1 K/UL — SIGNIFICANT CHANGE UP (ref 0–0.5)
GAS PNL BLDV: 139 MMOL/L — SIGNIFICANT CHANGE UP (ref 136–145)
GAS PNL BLDV: SIGNIFICANT CHANGE UP
GLUCOSE BLDV-MCNC: 90 MG/DL — SIGNIFICANT CHANGE UP (ref 70–99)
GLUCOSE SERPL-MCNC: 87 MG/DL — SIGNIFICANT CHANGE UP (ref 70–99)
HCO3 BLDV-SCNC: 23 MMOL/L — SIGNIFICANT CHANGE UP (ref 21–29)
HCT VFR BLD CALC: 35.2 % — SIGNIFICANT CHANGE UP (ref 34.5–45)
HCT VFR BLDA CALC: 36 % — LOW (ref 39–50)
HGB BLD CALC-MCNC: 11.8 G/DL — SIGNIFICANT CHANGE UP (ref 11.5–15.5)
HGB BLD-MCNC: 11.8 G/DL — SIGNIFICANT CHANGE UP (ref 11.5–15.5)
HYPOCHROMIA BLD QL: SLIGHT — SIGNIFICANT CHANGE UP
INR BLD: 0.99 RATIO — SIGNIFICANT CHANGE UP (ref 0.88–1.16)
LACTATE BLDV-MCNC: 1.3 MMOL/L — SIGNIFICANT CHANGE UP (ref 0.7–2)
LIDOCAIN IGE QN: 24 U/L — SIGNIFICANT CHANGE UP (ref 7–60)
LYMPHOCYTES # BLD AUTO: 24 % — SIGNIFICANT CHANGE UP (ref 13–44)
LYMPHOCYTES # BLD AUTO: 3.7 K/UL — HIGH (ref 1–3.3)
MCHC RBC-ENTMCNC: 23.6 PG — LOW (ref 27–34)
MCHC RBC-ENTMCNC: 33.4 GM/DL — SIGNIFICANT CHANGE UP (ref 32–36)
MCV RBC AUTO: 70.6 FL — LOW (ref 80–100)
METAMYELOCYTES # FLD: 4 % — HIGH (ref 0–0)
MICROCYTES BLD QL: SIGNIFICANT CHANGE UP
MONOCYTES # BLD AUTO: 0.6 K/UL — SIGNIFICANT CHANGE UP (ref 0–0.9)
MONOCYTES NFR BLD AUTO: 4 % — SIGNIFICANT CHANGE UP (ref 2–14)
NEUTROPHILS # BLD AUTO: 8.2 K/UL — HIGH (ref 1.8–7.4)
NEUTROPHILS NFR BLD AUTO: 67 % — SIGNIFICANT CHANGE UP (ref 43–77)
PCO2 BLDV: 37 MMHG — SIGNIFICANT CHANGE UP (ref 35–50)
PH BLDV: 7.41 — SIGNIFICANT CHANGE UP (ref 7.35–7.45)
PLAT MORPH BLD: NORMAL — SIGNIFICANT CHANGE UP
PLATELET # BLD AUTO: 361 K/UL — SIGNIFICANT CHANGE UP (ref 150–400)
PO2 BLDV: 56 MMHG — HIGH (ref 25–45)
POTASSIUM BLDV-SCNC: 3.8 MMOL/L — SIGNIFICANT CHANGE UP (ref 3.5–5.3)
POTASSIUM SERPL-MCNC: 4 MMOL/L — SIGNIFICANT CHANGE UP (ref 3.5–5.3)
POTASSIUM SERPL-SCNC: 4 MMOL/L — SIGNIFICANT CHANGE UP (ref 3.5–5.3)
PROT SERPL-MCNC: 6.2 G/DL — SIGNIFICANT CHANGE UP (ref 6–8.3)
PROTHROM AB SERPL-ACNC: 11.4 SEC — SIGNIFICANT CHANGE UP (ref 10–12.9)
RBC # BLD: 4.99 M/UL — SIGNIFICANT CHANGE UP (ref 3.8–5.2)
RBC # FLD: 17 % — HIGH (ref 10.3–14.5)
RBC BLD AUTO: ABNORMAL
SAO2 % BLDV: 88 % — SIGNIFICANT CHANGE UP (ref 67–88)
SODIUM SERPL-SCNC: 140 MMOL/L — SIGNIFICANT CHANGE UP (ref 135–145)
SURGICAL PATHOLOGY STUDY: SIGNIFICANT CHANGE UP
VARIANT LYMPHS # BLD: 1 % — SIGNIFICANT CHANGE UP (ref 0–6)
WBC # BLD: 12.6 K/UL — HIGH (ref 3.8–10.5)
WBC # FLD AUTO: 12.6 K/UL — HIGH (ref 3.8–10.5)

## 2019-04-28 PROCEDURE — 85610 PROTHROMBIN TIME: CPT

## 2019-04-28 PROCEDURE — 82947 ASSAY GLUCOSE BLOOD QUANT: CPT

## 2019-04-28 PROCEDURE — 82803 BLOOD GASES ANY COMBINATION: CPT

## 2019-04-28 PROCEDURE — 84295 ASSAY OF SERUM SODIUM: CPT

## 2019-04-28 PROCEDURE — 96374 THER/PROPH/DIAG INJ IV PUSH: CPT

## 2019-04-28 PROCEDURE — 85014 HEMATOCRIT: CPT

## 2019-04-28 PROCEDURE — 85027 COMPLETE CBC AUTOMATED: CPT

## 2019-04-28 PROCEDURE — 99243 OFF/OP CNSLTJ NEW/EST LOW 30: CPT

## 2019-04-28 PROCEDURE — 82330 ASSAY OF CALCIUM: CPT

## 2019-04-28 PROCEDURE — 99284 EMERGENCY DEPT VISIT MOD MDM: CPT | Mod: 25

## 2019-04-28 PROCEDURE — 84132 ASSAY OF SERUM POTASSIUM: CPT

## 2019-04-28 PROCEDURE — 83690 ASSAY OF LIPASE: CPT

## 2019-04-28 PROCEDURE — 85730 THROMBOPLASTIN TIME PARTIAL: CPT

## 2019-04-28 PROCEDURE — 82435 ASSAY OF BLOOD CHLORIDE: CPT

## 2019-04-28 PROCEDURE — 83605 ASSAY OF LACTIC ACID: CPT

## 2019-04-28 PROCEDURE — 80053 COMPREHEN METABOLIC PANEL: CPT

## 2019-04-28 PROCEDURE — 99284 EMERGENCY DEPT VISIT MOD MDM: CPT

## 2019-04-28 RX ORDER — ACETAMINOPHEN 500 MG
650 TABLET ORAL ONCE
Qty: 0 | Refills: 0 | Status: COMPLETED | OUTPATIENT
Start: 2019-04-28 | End: 2019-04-28

## 2019-04-28 RX ORDER — SODIUM CHLORIDE 9 MG/ML
1000 INJECTION INTRAMUSCULAR; INTRAVENOUS; SUBCUTANEOUS ONCE
Qty: 0 | Refills: 0 | Status: COMPLETED | OUTPATIENT
Start: 2019-04-28 | End: 2019-04-28

## 2019-04-28 RX ORDER — MORPHINE SULFATE 50 MG/1
2 CAPSULE, EXTENDED RELEASE ORAL ONCE
Qty: 0 | Refills: 0 | Status: DISCONTINUED | OUTPATIENT
Start: 2019-04-28 | End: 2019-04-28

## 2019-04-28 RX ORDER — IBUPROFEN 200 MG
600 TABLET ORAL ONCE
Qty: 0 | Refills: 0 | Status: COMPLETED | OUTPATIENT
Start: 2019-04-28 | End: 2019-04-28

## 2019-04-28 RX ORDER — ACETAMINOPHEN 500 MG
975 TABLET ORAL ONCE
Qty: 0 | Refills: 0 | Status: COMPLETED | OUTPATIENT
Start: 2019-04-28 | End: 2019-04-28

## 2019-04-28 RX ADMIN — Medication 600 MILLIGRAM(S): at 15:32

## 2019-04-28 RX ADMIN — Medication 975 MILLIGRAM(S): at 14:50

## 2019-04-28 RX ADMIN — Medication 600 MILLIGRAM(S): at 14:49

## 2019-04-28 RX ADMIN — MORPHINE SULFATE 2 MILLIGRAM(S): 50 CAPSULE, EXTENDED RELEASE ORAL at 15:55

## 2019-04-28 RX ADMIN — MORPHINE SULFATE 2 MILLIGRAM(S): 50 CAPSULE, EXTENDED RELEASE ORAL at 17:33

## 2019-04-28 RX ADMIN — SODIUM CHLORIDE 1000 MILLILITER(S): 9 INJECTION INTRAMUSCULAR; INTRAVENOUS; SUBCUTANEOUS at 14:50

## 2019-04-28 RX ADMIN — Medication 975 MILLIGRAM(S): at 15:32

## 2019-04-28 RX ADMIN — Medication 650 MILLIGRAM(S): at 19:56

## 2019-04-28 NOTE — ED ADULT NURSE NOTE - OBJECTIVE STATEMENT
Patient pres.ents to Ed with c/o discharge from  site and pain. Patient reports giving birth by  last   and has been experiencing abd pain with foul vaginal discharge and no surgical site is oozing pus. Patient denies any fever   reports vomiting and chills.

## 2019-04-28 NOTE — CONSULT NOTE ADULT - ATTENDING COMMENTS
Pt seen in ER w GYN team.  POD 8 S/p C/S w vertical skin incision due to extensive PSHx.  Pt presented to ER due to drainage from incision.  Pt reports having abd pain.  Took 3 tylenol this AM and no meds since that time.  Took advil and tylenol yesterday w 2 doses of oxycodone during day.  Denies vomiting or dysuria.  Tolerated breakfast this AM and now feels hungry.  Afebrile  Incision healing well;  <1cm skin separation draining serous fluid; no surrounding erythema;  incision probed and subcut and fascia intact  Abd- soft; appropriately tender post op state  Spec exam-  light/mod lochia, nl cervix, no CMT  WBC 12  No evidence of infection  Advised warm compresses to incision  F/U in office this wk  Advised tylenol, advil and oxycodone PRN pain

## 2019-04-28 NOTE — ED PROVIDER NOTE - PHYSICAL EXAMINATION
Gen.  mild discomfort from pain. no acute resp distress  HEENT:  perrl eomi   Lungs:  b/l BS. no retractions  CVS: S1S2   Abd;  soft, no guarding no distention. + ttp diffusely, greater over surgical scar. dressing with yellow / brown discharge in dressing. no active bleeding.   Ext: no pitting edema  Neuro: aaox3  MSK:  strength 5/5 b/l upper and lower ext.

## 2019-04-28 NOTE — ED PROVIDER NOTE - PROGRESS NOTE DETAILS
Ag pgy2: Patient states she feels much improved. Had extensive SDm conversation with her regarding CT. Patient does not want CT and will look out for signs/symptoms to return to the ER. Patient is non toxic appearing. OBGYN evaluated patient and do not recommend imaging at this time. No leukocytosis, no obvious pus draining from wound. Will d/c home with return precautions and close obgyn f/u. Patient has appointment with obgyn on tuesday.

## 2019-04-28 NOTE — CONSULT NOTE ADULT - PROBLEM SELECTOR RECOMMENDATION 9
-WBC 12.6 from 15.9 at discharge  -small serous drainage noted from lower portion of the incision  -no fevers  -no sign of abscess formation or infection  -post operative pain appropriate considering vertical skin incision  -recommend continuing tylenol and motrin standing and oxycodone prn at home  -pt to follow up in the office this week    Pt seen and examined with Dr. Martinez and Dr. Thurston PGY4  Tarun Cota MD PGY2

## 2019-04-28 NOTE — ED PROVIDER NOTE - NSFOLLOWUPINSTRUCTIONS_ED_ALL_ED_FT
Please return to the ER if you develop worsening symptoms, fevers, chills, vomiting, bloody stool, weakness, chest pain, shortness of breath, or have any other concerns. Please follow up with your obgyn on tuesday.

## 2019-04-28 NOTE — ED PROVIDER NOTE - OBJECTIVE STATEMENT
38F recent  recent csection vertical POD7 otherwise uncomplicated p/w abd pain. Gradually worsening at surgical site now oozing pus today. ASsociated with vomiting/chills and foul smelling vaginal discharge. Denies urinary symptoms, change in bowel movements, cp, sob, headaches, vision changes, no other complaints. Of note, patient is breastfeeding.

## 2019-04-28 NOTE — CONSULT NOTE ADULT - SUBJECTIVE AND OBJECTIVE BOX
38y  POD#7 status post elective primary  section with vertical skin incision due to stage 4 endometriosis with severe adhesions who presents with drainage from her incision x2 days. She notes that she noted a small amount of drainage from the incision over the last two days. She also notes that she has had increased vaginal pain when passing clots as well as a brown discharge from her vagina. She notes increased pain today, but she has only taken tylenol. She had been taking tylenol, motrin and oxycodone and had adequate pain control on that regimen. She notes chills since last night. She has been having increased abdominal painShe denies any fevers, vomiting, chest pain, shortness of breath, diarrhea, and dysuria.      Last Menstrual Period        OB/GYN HISTORY: prior induction of labor for 26 demise  PAST MEDICAL & SURGICAL HISTORY:  Pregnant  Lupus  Lupus  Hashimoto's disease  Hypothyroid  Endometriosis: Stage 4  Migraine  History of appendectomy: ruptured  h/o laparoscopy x3 for endometriosis    Allergies    latex (Rash)  No Known Drug Allergies    Intolerances      MEDICATIONS  (STANDING):  morphine  - Injectable 2 milliGRAM(s) IV Push Once    MEDICATIONS  (PRN):    FAMILY HISTORY:  No pertinent family history in first degree relatives    SOCIAL HISTORY: denies tobacco, alcohol and illicit drug use    Name of GYN Physician: Yumiko     Vital Signs Last 24 Hrs  T(C): 36.8 (2019 12:56), Max: 36.8 (2019 12:56)  T(F): 98.2 (2019 12:56), Max: 98.2 (2019 12:56)  HR: 77 (2019 12:56) (77 - 77)  BP: 119/81 (2019 12:56) (119/81 - 119/81)  BP(mean): --  RR: 18 (2019 12:56) (18 - 18)  SpO2: 98% (2019 12:56) (98% - 98%)    PHYSICAL EXAM:      Constitutional: alert and oriented x 3    Respiratory: clear    Cardiovascular: regular rate and rhythm    Gastrointestinal: soft, appropriately tender, + bowel sounds. No hepatosplenomegaly, no palpable masses  Incision: midline vertical skin incision extending from above the umbilicus to the pubic symphysis, healing appropriately, small sub centimeter defect in the incision with serous drainage, probed with a swab, no fascial defect noted    SSE: cervix appears normal, small old blood noted in the vault  SVE: mild tenderness over the uterus    Extremities: no tenderness in swelling in b/l LE    LABS:                        11.8   12.6  )-----------( 361      ( 2019 15:35 )             35.2                 Blood Type: B Positive      RADIOLOGY & ADDITIONAL STUDIES:

## 2019-04-28 NOTE — ED ADULT NURSE NOTE - NSIMPLEMENTINTERV_GEN_ALL_ED
Implemented All Universal Safety Interventions:  [place] to call system. Call bell, personal items and telephone within reach. Instruct patient to call for assistance. Room bathroom lighting operational. Non-slip footwear when patient is off stretcher. Physically safe environment: no spills, clutter or unnecessary equipment. Stretcher in lowest position, wheels locked, appropriate side rails in place.

## 2019-04-28 NOTE — ED PROVIDER NOTE - PMH
Endometriosis  Stage 4  Hashimoto's disease    Hypothyroid    Lupus    Lupus    Migraine    Pregnant

## 2019-04-28 NOTE — ED PROVIDER NOTE - NSFOLLOWUPCLINICS_GEN_ALL_ED_FT
[facility] Gynecology and Obstetrics  Gynceology/OB  [address] [place] [address]  Phone: [phone]  Fax:   Follow Up Time:

## 2019-04-28 NOTE — ED PROVIDER NOTE - CLINICAL SUMMARY MEDICAL DECISION MAKING FREE TEXT BOX
Concern for post op infection, retained fetal parts. hemodynamically stable. Will image, fluids, ob consult, reassess. Concern for post op infection, retained fetal parts. hemodynamically stable. Will image, fluids, ob consult, reassess.  ATTG: : abd pain pod #7, concern for infection / retained products, obstruction, will check labs, check US, check urinalysis, consider ct, re eval for dispo.

## 2019-04-28 NOTE — CONSULT NOTE ADULT - ASSESSMENT
38y  POD#7 status post elective primary  section with vertical skin incision due to stage 4 endometriosis with severe adhesions who presents with drainage from her incision x2 days.

## 2019-04-29 ENCOUNTER — RESULT REVIEW (OUTPATIENT)
Age: 39
End: 2019-04-29

## 2019-04-30 ENCOUNTER — APPOINTMENT (OUTPATIENT)
Dept: OBGYN | Facility: CLINIC | Age: 39
End: 2019-04-30
Payer: COMMERCIAL

## 2019-04-30 VITALS — HEIGHT: 62 IN | DIASTOLIC BLOOD PRESSURE: 83 MMHG | HEART RATE: 97 BPM | SYSTOLIC BLOOD PRESSURE: 118 MMHG

## 2019-04-30 PROCEDURE — 99213 OFFICE O/P EST LOW 20 MIN: CPT

## 2019-04-30 NOTE — ED PROVIDER NOTE - NS ED ATTENDING NAME FT
Eyes with no visual disturbances.  Ears clean and dry and no hearing difficulties. Nose with pink mucosa and no drainage.  Mouth mucous membranes moist and pink.  No tenderness or swelling to throat or neck. Dr Sneed

## 2019-04-30 NOTE — CHIEF COMPLAINT
[FreeTextEntry1] : s/p primary vertical skin mid transverse C/S 4/21; wound  <1cm superficially in mid portion. Wound cleaned with H2O2. It is mildly-moderately tender on palpation but not red/hard. Will treat with Keflex 500q6 X7d and see pt next week.

## 2019-05-08 ENCOUNTER — APPOINTMENT (OUTPATIENT)
Dept: SURGERY | Facility: HOSPITAL | Age: 39
End: 2019-05-08

## 2019-05-08 ENCOUNTER — APPOINTMENT (OUTPATIENT)
Dept: OBGYN | Facility: CLINIC | Age: 39
End: 2019-05-08

## 2019-05-11 DIAGNOSIS — Z87.09 PERSONAL HISTORY OF OTHER DISEASES OF THE RESPIRATORY SYSTEM: ICD-10-CM

## 2019-05-14 ENCOUNTER — APPOINTMENT (OUTPATIENT)
Dept: OBGYN | Facility: CLINIC | Age: 39
End: 2019-05-14
Payer: COMMERCIAL

## 2019-05-14 VITALS
HEIGHT: 62 IN | DIASTOLIC BLOOD PRESSURE: 82 MMHG | SYSTOLIC BLOOD PRESSURE: 114 MMHG | WEIGHT: 148 LBS | BODY MASS INDEX: 27.23 KG/M2

## 2019-05-14 PROCEDURE — 0503F POSTPARTUM CARE VISIT: CPT

## 2019-05-14 NOTE — HISTORY OF PRESENT ILLNESS
[Postpartum Follow Up] : postpartum follow up [Delivery Date: ___] : on [unfilled] [Complications:___] : no complications [Primary C/S] : delivered by  section [Male] : Delivery History: baby boy [Wt. ___] : weighing [unfilled] [NICU: ___] : NICU: [unfilled] [Rubella Vaccine] : Rubella vaccine was not administered [Rhogam] : Rhogam was not administered [BTL] : no tubal ligation [Pertussis Vaccine] : Pertussis vaccine administered [Breastfeeding] : currently nursing [BF with Difficulty] : nursing without difficulty [Resumed Menses] : has not resumed her menses [Intended Contraception] : the patient does not intended to use contraception postpartum [Resumed [place]] : has not resumed intercourse [S/Sx PP Depression] : no signs/symptoms of postpartum depression [Clean/Dry/Intact] : clean, dry and intact [Erythema] : not erythematous [Swelling] : not swollen [Dehiscence] : not dehisced [Healed] : healed [Mild] : mild vaginal bleeding [Back to Normal] : is still enlarged [Normal] : the vagina was normal [Cervix Sample Taken] : cervical sample not taken for a Pap smear [Not Done] : Examination of breasts not done [Doing Well] : is doing well [No Sign of Infection] : is showing no signs of infection [Excellent Pain Control] : has excellent pain control [None] : None [FreeTextEntry8] : s/p Primary C/S 4/21 for placenta previa with 3rd bleed; Hillary Shepard; Breast and bottle feeding; Lochia scant; No complaints re wound; Moods OK

## 2019-05-15 ENCOUNTER — APPOINTMENT (OUTPATIENT)
Dept: INTERNAL MEDICINE | Facility: CLINIC | Age: 39
End: 2019-05-15
Payer: COMMERCIAL

## 2019-05-15 VITALS
BODY MASS INDEX: 26.68 KG/M2 | SYSTOLIC BLOOD PRESSURE: 120 MMHG | HEIGHT: 62 IN | DIASTOLIC BLOOD PRESSURE: 80 MMHG | RESPIRATION RATE: 14 BRPM | HEART RATE: 118 BPM | OXYGEN SATURATION: 98 % | WEIGHT: 145 LBS

## 2019-05-15 DIAGNOSIS — G43.909 MIGRAINE, UNSPECIFIED, NOT INTRACTABLE, W/OUT STATUS MIGRAINOSUS: ICD-10-CM

## 2019-05-15 DIAGNOSIS — D50.0 IRON DEFICIENCY ANEMIA SECONDARY TO BLOOD LOSS (CHRONIC): ICD-10-CM

## 2019-05-15 PROCEDURE — 99214 OFFICE O/P EST MOD 30 MIN: CPT

## 2019-05-19 RX ORDER — OSELTAMIVIR PHOSPHATE 75 MG/1
75 CAPSULE ORAL TWICE DAILY
Qty: 10 | Refills: 0 | Status: COMPLETED | COMMUNITY
Start: 2019-05-11 | End: 2019-05-19

## 2019-05-19 RX ORDER — CEPHALEXIN 500 MG/1
500 CAPSULE ORAL 4 TIMES DAILY
Qty: 28 | Refills: 0 | Status: COMPLETED | COMMUNITY
Start: 2019-04-30 | End: 2019-05-19

## 2019-05-19 RX ORDER — AMPICILLIN 500 MG/1
500 CAPSULE ORAL
Qty: 28 | Refills: 0 | Status: COMPLETED | COMMUNITY
Start: 2019-01-22 | End: 2019-05-19

## 2019-05-19 RX ORDER — OXYCODONE HYDROCHLORIDE 5 MG/1
5 CAPSULE ORAL
Qty: 30 | Refills: 0 | Status: COMPLETED | COMMUNITY
Start: 2019-04-25 | End: 2019-05-19

## 2019-05-19 RX ORDER — NITROFURANTOIN (MONOHYDRATE/MACROCRYSTALS) 25; 75 MG/1; MG/1
100 CAPSULE ORAL TWICE DAILY
Qty: 14 | Refills: 0 | Status: COMPLETED | COMMUNITY
Start: 2019-01-28 | End: 2019-05-19

## 2019-05-19 RX ORDER — CEPHALEXIN 500 MG/1
500 TABLET ORAL
Qty: 28 | Refills: 0 | Status: COMPLETED | COMMUNITY
Start: 2019-03-27 | End: 2019-05-19

## 2019-05-19 RX ORDER — AMOXICILLIN AND CLAVULANATE POTASSIUM 875; 125 MG/1; MG/1
875-125 TABLET, COATED ORAL
Qty: 14 | Refills: 0 | Status: COMPLETED | COMMUNITY
Start: 2019-02-01 | End: 2019-05-19

## 2019-05-19 NOTE — PHYSICAL EXAM
[Well Nourished] : well nourished [Well Developed] : well developed [No Acute Distress] : no acute distress [PERRL] : pupils equal round and reactive to light [Well-Appearing] : well-appearing [No JVD] : no jugular venous distention [EOMI] : extraocular movements intact [Clear to Auscultation] : lungs were clear to auscultation bilaterally [Supple] : supple [No Respiratory Distress] : no respiratory distress  [No Accessory Muscle Use] : no accessory muscle use [Normal Percussion] : the chest was normal to percussion [Normal S1, S2] : normal S1 and S2 [Regular Rhythm] : with a regular rhythm [Normal Rate] : normal rate  [No Carotid Bruits] : no carotid bruits [No Edema] : there was no peripheral edema [No Murmur] : no murmur heard [No Extremity Clubbing/Cyanosis] : no extremity clubbing/cyanosis [No Focal Deficits] : no focal deficits

## 2019-05-19 NOTE — HISTORY OF PRESENT ILLNESS
[FreeTextEntry8] : Here because having headaches since successful delivery of baby boy.  Describes them as her migraines, just lighter - gets them around L eye and forehead, has less nausea than usual with them, they respond after a few hours to tylenol and rest/lying down.  She is hesitant to take anything else because of breastfeeding

## 2019-05-19 NOTE — REVIEW OF SYSTEMS
[Discharge] : no discharge [Pain] : no pain [Redness] : no redness [Negative] : Respiratory [de-identified] : see hpi

## 2019-05-19 NOTE — ASSESSMENT
[FreeTextEntry1] : 1) migraines - reviewed rx options; she'd like to stick to tylenol/resting when they come for now, as they have been less severe than her usual headaches.  Discussed lactation w the abortive meds - she used to require sumatriptan for them, and researching it together we saw that if she were to need it again, she could consider pumping some feeds and using those as not breastfeeding for ~10 hours after a dose is recommended.  2) has been on Fe since delivery, wanted to know if she could back off or come off - will check her Fe indices, h/h; also asking if she can check her thyroid levels postpartum; happens to be w/o food x 7-8 hours, will get lipids/glc also.

## 2019-05-20 ENCOUNTER — TRANSCRIPTION ENCOUNTER (OUTPATIENT)
Age: 39
End: 2019-05-20

## 2019-06-12 LAB
ANION GAP SERPL CALC-SCNC: 14 MMOL/L
BASOPHILS # BLD AUTO: 0.05 K/UL
BASOPHILS NFR BLD AUTO: 0.6 %
BUN SERPL-MCNC: 4 MG/DL
CALCIUM SERPL-MCNC: 9.8 MG/DL
CHLORIDE SERPL-SCNC: 107 MMOL/L
CHOLEST SERPL-MCNC: 235 MG/DL
CHOLEST/HDLC SERPL: 5 RATIO
CO2 SERPL-SCNC: 24 MMOL/L
CREAT SERPL-MCNC: 0.46 MG/DL
EOSINOPHIL # BLD AUTO: 0.07 K/UL
EOSINOPHIL NFR BLD AUTO: 0.9 %
FERRITIN SERPL-MCNC: 63 NG/ML
GLUCOSE SERPL-MCNC: 99 MG/DL
HCT VFR BLD CALC: 39.3 %
HDLC SERPL-MCNC: 47 MG/DL
HGB BLD-MCNC: 11.9 G/DL
IMM GRANULOCYTES NFR BLD AUTO: 0.9 %
IRON SATN MFR SERPL: 11 %
IRON SERPL-MCNC: 37 UG/DL
LDLC SERPL CALC-MCNC: 138 MG/DL
LYMPHOCYTES # BLD AUTO: 2.47 K/UL
LYMPHOCYTES NFR BLD AUTO: 30.6 %
MAN DIFF?: NORMAL
MCHC RBC-ENTMCNC: 22.5 PG
MCHC RBC-ENTMCNC: 30.3 GM/DL
MCV RBC AUTO: 74.4 FL
MONOCYTES # BLD AUTO: 0.37 K/UL
MONOCYTES NFR BLD AUTO: 4.6 %
NEUTROPHILS # BLD AUTO: 5.03 K/UL
NEUTROPHILS NFR BLD AUTO: 62.4 %
PLATELET # BLD AUTO: 377 K/UL
POTASSIUM SERPL-SCNC: 4.5 MMOL/L
RBC # BLD: 5.28 M/UL
RBC # FLD: 19.2 %
SODIUM SERPL-SCNC: 144 MMOL/L
T4 FREE SERPL-MCNC: 1.8 NG/DL
TIBC SERPL-MCNC: 348 UG/DL
TRIGL SERPL-MCNC: 250 MG/DL
TSH SERPL-ACNC: 0.11 UIU/ML
UIBC SERPL-MCNC: 311 UG/DL
WBC # FLD AUTO: 8.06 K/UL

## 2019-06-14 ENCOUNTER — CHART COPY (OUTPATIENT)
Age: 39
End: 2019-06-14

## 2019-06-26 LAB
GLUCOSE 2H P 100 G GLC PO SERPL-MCNC: 87 MG/DL
GLUCOSE BS SERPL-MCNC: 86 MG/DL

## 2019-07-02 ENCOUNTER — APPOINTMENT (OUTPATIENT)
Dept: PLASTIC SURGERY | Facility: CLINIC | Age: 39
End: 2019-07-02
Payer: COMMERCIAL

## 2019-07-02 VITALS — BODY MASS INDEX: 28.52 KG/M2 | HEIGHT: 62 IN | WEIGHT: 155 LBS

## 2019-07-02 PROCEDURE — 99241 OFFICE CONSULTATION NEW/ESTAB PATIENT 15 MIN: CPT

## 2019-07-03 NOTE — REASON FOR VISIT
[Consultation] : a consultation visit [FreeTextEntry1] : Pt presents here at the request of Dr. Jackson(Dermatologist) regarding sunspot on the right cheek. Pt states sun spot appeared over 10 yrs ago, and was very light. Pt states it was originally small, however during pregnancy, it has grown larger, and become darker. Pt states she is not using sunscreen.

## 2019-07-03 NOTE — HISTORY OF PRESENT ILLNESS
[FreeTextEntry1] : Saba is a 38 year old female who presents for an initial evaluation today.  patient reports a dark brown spot on her right cheek, which has been present for approximately 10 years.  She feels it has progressively gotten larger and darker during the course of her recent pregnancy.  She states the site was biopsied and was benign.  She is currently breast feeding her 10 week old son.  Saba reports intermittently wearing sunscreen.

## 2019-07-03 NOTE — PHYSICAL EXAM
[de-identified] : alert, calm, cooperative  [de-identified] : hyperpigmented lesion to the right cheek. Borders are irregular and lesion measures approximately 1 cm in maximal diameter.   [de-identified] : respirations are even and unlabored\par

## 2019-07-11 ENCOUNTER — APPOINTMENT (OUTPATIENT)
Dept: MATERNAL FETAL MEDICINE | Facility: CLINIC | Age: 39
End: 2019-07-11

## 2019-08-05 ENCOUNTER — CLINICAL ADVICE (OUTPATIENT)
Age: 39
End: 2019-08-05

## 2019-08-06 ENCOUNTER — OUTPATIENT (OUTPATIENT)
Dept: OUTPATIENT SERVICES | Facility: HOSPITAL | Age: 39
LOS: 1 days | Discharge: HOME | End: 2019-08-06

## 2019-08-06 ENCOUNTER — OTHER (OUTPATIENT)
Age: 39
End: 2019-08-06

## 2019-08-06 DIAGNOSIS — Z90.722 ACQUIRED ABSENCE OF OVARIES, BILATERAL: Chronic | ICD-10-CM

## 2019-08-06 DIAGNOSIS — Z98.890 OTHER SPECIFIED POSTPROCEDURAL STATES: Chronic | ICD-10-CM

## 2019-08-06 DIAGNOSIS — Z90.49 ACQUIRED ABSENCE OF OTHER SPECIFIED PARTS OF DIGESTIVE TRACT: Chronic | ICD-10-CM

## 2019-08-07 DIAGNOSIS — F41.9 ANXIETY DISORDER, UNSPECIFIED: ICD-10-CM

## 2019-08-20 ENCOUNTER — OUTPATIENT (OUTPATIENT)
Dept: OUTPATIENT SERVICES | Facility: HOSPITAL | Age: 39
LOS: 1 days | Discharge: LEFT BEFORE DISCHARGE | End: 2019-08-20
Payer: COMMERCIAL

## 2019-08-20 DIAGNOSIS — Z90.49 ACQUIRED ABSENCE OF OTHER SPECIFIED PARTS OF DIGESTIVE TRACT: Chronic | ICD-10-CM

## 2019-08-20 DIAGNOSIS — Z90.722 ACQUIRED ABSENCE OF OVARIES, BILATERAL: Chronic | ICD-10-CM

## 2019-08-20 DIAGNOSIS — Z98.890 OTHER SPECIFIED POSTPROCEDURAL STATES: Chronic | ICD-10-CM

## 2019-08-22 ENCOUNTER — OTHER (OUTPATIENT)
Age: 39
End: 2019-08-22

## 2019-09-05 DIAGNOSIS — F33.1 MAJOR DEPRESSIVE DISORDER, RECURRENT, MODERATE: ICD-10-CM

## 2019-09-24 RX ORDER — SUMATRIPTAN 50 MG/1
50 TABLET, FILM COATED ORAL
Qty: 1 | Refills: 1 | Status: ACTIVE | COMMUNITY
Start: 2019-09-24 | End: 1900-01-01

## 2019-11-07 ENCOUNTER — APPOINTMENT (OUTPATIENT)
Dept: INTERNAL MEDICINE | Facility: CLINIC | Age: 39
End: 2019-11-07

## 2019-11-30 ENCOUNTER — RX RENEWAL (OUTPATIENT)
Age: 39
End: 2019-11-30

## 2019-12-12 ENCOUNTER — APPOINTMENT (OUTPATIENT)
Dept: INTERNAL MEDICINE | Facility: CLINIC | Age: 39
End: 2019-12-12
Payer: COMMERCIAL

## 2019-12-12 VITALS
SYSTOLIC BLOOD PRESSURE: 102 MMHG | OXYGEN SATURATION: 99 % | WEIGHT: 145 LBS | BODY MASS INDEX: 26.52 KG/M2 | HEART RATE: 94 BPM | DIASTOLIC BLOOD PRESSURE: 74 MMHG

## 2019-12-12 VITALS — DIASTOLIC BLOOD PRESSURE: 70 MMHG | SYSTOLIC BLOOD PRESSURE: 102 MMHG

## 2019-12-12 PROCEDURE — 99214 OFFICE O/P EST MOD 30 MIN: CPT

## 2019-12-12 RX ORDER — PEN NEEDLE, DIABETIC 29 G X1/2"
32G X 4 MM NEEDLE, DISPOSABLE MISCELLANEOUS
Qty: 1 | Refills: 0 | Status: DISCONTINUED | COMMUNITY
Start: 2019-03-06 | End: 2019-12-12

## 2019-12-12 RX ORDER — DOXYLAMINE SUCCINATE AND PYRIDOXINE HYDROCHLORIDE 10; 10 MG/1; MG/1
10-10 TABLET, DELAYED RELEASE ORAL
Qty: 90 | Refills: 1 | Status: DISCONTINUED | COMMUNITY
Start: 2018-12-04 | End: 2019-12-12

## 2019-12-12 RX ORDER — SUMATRIPTAN 50 MG/1
50 TABLET, FILM COATED ORAL ONCE
Qty: 7 | Refills: 0 | Status: DISCONTINUED | COMMUNITY
Start: 2019-08-22 | End: 2019-12-12

## 2019-12-12 RX ORDER — ONDANSETRON 8 MG/1
8 TABLET, ORALLY DISINTEGRATING ORAL EVERY 8 HOURS
Qty: 30 | Refills: 2 | Status: DISCONTINUED | COMMUNITY
Start: 2018-12-04 | End: 2019-12-12

## 2019-12-12 RX ORDER — HYDROCORTISONE 25 MG/G
2.5 CREAM TOPICAL
Qty: 1 | Refills: 0 | Status: DISCONTINUED | COMMUNITY
Start: 2019-02-13 | End: 2019-12-12

## 2019-12-12 RX ORDER — ONDANSETRON 8 MG/1
8 TABLET, ORALLY DISINTEGRATING ORAL EVERY 8 HOURS
Qty: 30 | Refills: 2 | Status: DISCONTINUED | COMMUNITY
Start: 2018-11-06 | End: 2019-12-12

## 2019-12-12 RX ORDER — LANCETS 30 GAUGE
EACH MISCELLANEOUS
Qty: 1 | Refills: 4 | Status: DISCONTINUED | COMMUNITY
Start: 2019-02-26 | End: 2019-12-12

## 2019-12-12 RX ORDER — BUTALBITAL, ACETAMINOPHEN AND CAFFEINE 300; 50; 40 MG/1; MG/1; MG/1
50-300-40 CAPSULE ORAL
Qty: 30 | Refills: 0 | Status: DISCONTINUED | COMMUNITY
Start: 2018-12-04 | End: 2019-12-12

## 2019-12-12 RX ORDER — FREMANEZUMAB-VFRM 225 MG/1.5ML
225 INJECTION SUBCUTANEOUS
Refills: 0 | Status: ACTIVE | COMMUNITY
Start: 2019-12-12

## 2019-12-12 RX ORDER — INSULIN HUMAN 100 [IU]/ML
100 INJECTION, SUSPENSION SUBCUTANEOUS
Qty: 2 | Refills: 3 | Status: DISCONTINUED | COMMUNITY
Start: 2019-03-06 | End: 2019-12-12

## 2019-12-12 RX ORDER — 70%ISOPROPYL ALCOHOL 0.7 ML/ML
70 SWAB TOPICAL
Qty: 1 | Refills: 0 | Status: DISCONTINUED | COMMUNITY
Start: 2019-03-06 | End: 2019-12-12

## 2019-12-12 RX ORDER — LEVOTHYROXINE SODIUM 100 UG/1
100 TABLET ORAL
Refills: 0 | Status: DISCONTINUED | COMMUNITY
Start: 2018-11-06 | End: 2019-12-12

## 2019-12-12 RX ORDER — BLOOD SUGAR DIAGNOSTIC
STRIP MISCELLANEOUS
Qty: 1 | Refills: 4 | Status: DISCONTINUED | COMMUNITY
Start: 2019-02-26 | End: 2019-12-12

## 2019-12-12 RX ORDER — ENOXAPARIN SODIUM 100 MG/ML
40 INJECTION SUBCUTANEOUS DAILY
Qty: 30 | Refills: 9 | Status: DISCONTINUED | COMMUNITY
Start: 2018-11-08 | End: 2019-12-12

## 2019-12-12 RX ORDER — URINE ACETONE TEST STRIPS
STRIP MISCELLANEOUS
Qty: 1 | Refills: 1 | Status: DISCONTINUED | COMMUNITY
Start: 2019-02-26 | End: 2019-12-12

## 2019-12-13 LAB
ANION GAP SERPL CALC-SCNC: 11 MMOL/L
BASOPHILS # BLD AUTO: 0.05 K/UL
BASOPHILS NFR BLD AUTO: 0.5 %
BUN SERPL-MCNC: 8 MG/DL
CALCIUM SERPL-MCNC: 9.7 MG/DL
CHLORIDE SERPL-SCNC: 104 MMOL/L
CO2 SERPL-SCNC: 27 MMOL/L
CREAT SERPL-MCNC: 0.61 MG/DL
EOSINOPHIL # BLD AUTO: 0.07 K/UL
EOSINOPHIL NFR BLD AUTO: 0.7 %
GLUCOSE SERPL-MCNC: 71 MG/DL
HCT VFR BLD CALC: 39.3 %
HGB BLD-MCNC: 12.1 G/DL
IMM GRANULOCYTES NFR BLD AUTO: 0.4 %
LYMPHOCYTES # BLD AUTO: 2.68 K/UL
LYMPHOCYTES NFR BLD AUTO: 25.2 %
MAN DIFF?: NORMAL
MCHC RBC-ENTMCNC: 23.1 PG
MCHC RBC-ENTMCNC: 30.8 GM/DL
MCV RBC AUTO: 75.1 FL
MONOCYTES # BLD AUTO: 0.5 K/UL
MONOCYTES NFR BLD AUTO: 4.7 %
NEUTROPHILS # BLD AUTO: 7.3 K/UL
NEUTROPHILS NFR BLD AUTO: 68.5 %
PLATELET # BLD AUTO: 334 K/UL
POTASSIUM SERPL-SCNC: 4.1 MMOL/L
RBC # BLD: 5.23 M/UL
RBC # FLD: 15.6 %
SODIUM SERPL-SCNC: 142 MMOL/L
TSH SERPL-ACNC: 1.45 UIU/ML
WBC # FLD AUTO: 10.64 K/UL

## 2019-12-13 NOTE — HISTORY OF PRESENT ILLNESS
[FreeTextEntry8] : 40 y/o F here for acute visit\par c/o generalized weakness, dizziness on/off for the past 2 mos. \par She delivered her son 7 months ago. \par Would like her TSH checked\par C.o right sided chest pain\par No SOB, no palpitations\par Stopped breast feeding 4-5 months ago\par Sleeps 5 hours each night, continuous; sometimes less\par Mom helps with baby\par Works as an  as well

## 2019-12-13 NOTE — ASSESSMENT
[FreeTextEntry1] : 38 y/o F here for acute visit\par Not orthostatic, but advised increased hydration. Avoid skipping meals. Encouraged to eat small portions of a healthy snack every 2 hours in between meals. Encouraged to try to get more sleep at nights. Check cbc, bmp. \par CHeck TSH\par Atypical chest pain: Reassurance given. Advil with food (not breast feeding). Warm compresses\par Psych: f/u therapist\par HCM: had flu shot\par f/u prn

## 2019-12-13 NOTE — ADDENDUM
[FreeTextEntry1] : Reviewed labs, s.w pt.\par TSH wnl, c/w regimen. CBC- mildly elevated WBC count, but lower than past. H/H wnl\par BMP wnl. Reinforced discussion we had during visit.

## 2019-12-13 NOTE — PHYSICAL EXAM
[No Acute Distress] : no acute distress [Well Nourished] : well nourished [Well Developed] : well developed [No Respiratory Distress] : no respiratory distress  [No JVD] : no jugular venous distention [Clear to Auscultation] : lungs were clear to auscultation bilaterally [No Accessory Muscle Use] : no accessory muscle use [Normal Rate] : normal rate  [Regular Rhythm] : with a regular rhythm [Normal S1, S2] : normal S1 and S2 [Soft] : abdomen soft [Non Tender] : non-tender [No HSM] : no HSM [No Spinal Tenderness] : no spinal tenderness [No CVA Tenderness] : no CVA  tenderness [de-identified] : tenderness to palpation along right costochondral area

## 2020-02-06 NOTE — ED ADULT TRIAGE NOTE - ESI TRIAGE ACUITY LEVEL, MLM
Attempted to call pt for PVP. No answer. Left voicemail to remind of appt.     Sidney Ramirez on 2/6/2020 at 1:48 PM  EMT - [facility] Guide  [email or web address]  [phone]      
3

## 2020-03-05 ENCOUNTER — APPOINTMENT (OUTPATIENT)
Dept: PAIN MANAGEMENT | Facility: CLINIC | Age: 40
End: 2020-03-05

## 2020-03-18 ENCOUNTER — RX RENEWAL (OUTPATIENT)
Age: 40
End: 2020-03-18

## 2020-03-18 RX ORDER — LEVOTHYROXINE SODIUM 0.11 MG/1
112 TABLET ORAL DAILY
Qty: 90 | Refills: 0 | Status: ACTIVE | COMMUNITY
Start: 2019-01-17 | End: 1900-01-01

## 2020-05-05 NOTE — DIETITIAN INITIAL EVALUATION ADULT. - POUNDS LOST/GAINED
Chief Complaint: \"Crying\"    SUBJECTIVE:   Mandy Brandon is a 29 year old female who had Vaginal spontaneous on 4 weeks ago and presents today with symptoms of possible postpartum depression.  Her symptoms occur throughout the day,  and began 2 weeks  ago.      She does not  has thoughts of suicide of homicide.  She admits depressed mood, loss of interests/pleasure, loss of energy, lots of anger.  She denies homicidal ideations and suicidal ideations.  .    Risk factors for postpartum depression include: covid stay at home orders,  cannot help with kids since he is exposed, no help with toddler      Pt was seen today for a peds appt for her  and scored high on the EPDS.  Pt states she is very stressed and angry right now.  Her toddler cannot play outside and follows her around.  Her  is not staying with them because he could be exposed to covid daily at work so he is living in the basement away from the kids.  She did okay initially because she was living with her mother after discharge who could help with her toddler.      Patient's last menstrual period was 2019 (approximate).   OB History    Para Term  AB Living   3 2 2   1 2   SAB TAB Ectopic Molar Multiple Live Births             2      # Outcome Date GA Lbr Wilian/2nd Weight Sex Delivery Anes PTL Lv   3 Term 20 39w6d / 00:13 3.595 kg (7 lb 14.8 oz) F Vag-Spont EPI N POPEYE   2 Term 16 40w5d 13:48 / 03:47 3.92 kg (8 lb 10.3 oz) M Vag-Spont EPI N POPEYE      Birth Comments: cleft lip   1 AB 2009 8w0d              [place]  Depression Scale: 14    Social History:   Social History     Socioeconomic History   • Marital status: /Civil Union     Spouse name: Not on file   • Number of children: Not on file   • Years of education: Not on file   • Highest education level: Not on file   Occupational History   • Occupation:      Comment: [employer]   Social Needs   • Financial resource strain: Not on  file   • Food insecurity:     Worry: Not on file     Inability: Not on file   • Transportation needs:     Medical: Not on file     Non-medical: Not on file   Tobacco Use   • Smoking status: Never Smoker   • Smokeless tobacco: Never Used   Substance and Sexual Activity   • Alcohol use: Not Currently   • Drug use: Never   • Sexual activity: Yes     Partners: Male   Lifestyle   • Physical activity:     Days per week: Not on file     Minutes per session: Not on file   • Stress: Not on file   Relationships   • Social connections:     Talks on phone: Not on file     Gets together: Not on file     Attends [employer] service: Not on file     Active member of club or organization: Not on file     Attends meetings of clubs or organizations: Not on file     Relationship status: Not on file   • Intimate partner violence:     Fear of current or ex partner: Not on file     Emotionally abused: Not on file     Physically abused: Not on file     Forced sexual activity: Not on file   Other Topics Concern   • Not on file   Social History Narrative   • Not on file     Past Medical History:   Diagnosis Date   • Acquired hypothyroidism 8/30/2019   • Chicken pox    • Mastodynia 06/28/2012   • Nipple discharge 06/28/2012   • Pelvic pain in female 12/16/2010   • Urinary incontinence 12/22/2011     History reviewed. No pertinent surgical history.  Family History:   Family History   Problem Relation Age of Onset   • Cancer, Breast Mother    • Thyroid Father        ALLERGIES:  No Known Allergies      Current Outpatient Medications   Medication Sig Dispense Refill   • Prenatal Vit-Fe Fumarate-FA (PRENATAL PO)      • levothyroxine (SYNTHROID) 75 MCG tablet Take 75 mcg by mouth.       No current facility-administered medications for this visit.        ROS  Constitution: + fatigue   No headaches, no unexplained chest pain, dyspnea, SOB, abdominal pain, bladder or bowel complaints.  All other systems reviewed are negative.    OBJECTIVE  Physical  Exam  Vitals:    05/05/20 1327   BP: 104/72   Pulse: 72   Resp: 16   Temp: 96.7 °F (35.9 °C)       CONSTITUTIONAL:    Appearance: well-nourished, well developed, alert, in no acute distress    NECK    Thyroid: gland size normal, nontender, no nodules or masses present on palpation    CHEST   Respiratory effort: breathing unlabored    NEUROLOGIC/PSYCHIATRIC   Orientation: grossly oriented to person, place and time   Judgment and Insight: judgment and insight intact   Mood and Affect: mood sad, affect appropriate   Thought Processes: abstract reasoning intact, computations normal, reasoning logical   Attention Span:attention span normal   Associations: associations normal   Thought Content: no delusions present, no hallucinations   Coordination: coordination normal   Gait and Station: normal gait    ASSESSMENT  Postpartum Depression    PLAN  Reviewed pt's symptoms  Discussed that a lot of this may be situational due to covid.  Discussed options for helping with mood.  Will start zoloft 50mg daily - risks reviewed  Also discussed counseling but pt states she does not have the time  Suggested she may want to move back in with her mother again so she has some help  Pt to keep 6wk postpartum visit in 2wks.  Pt may not return to work for at least 4wks  RTC 2wks    More than 50% of visit spent counseling, approximately 25 minutes    Mandy repeated all of the instructions and states she understands the plan of care.       10

## 2020-07-05 ENCOUNTER — EMERGENCY (EMERGENCY)
Facility: HOSPITAL | Age: 40
LOS: 1 days | Discharge: HOME | End: 2020-07-05
Attending: EMERGENCY MEDICINE | Admitting: EMERGENCY MEDICINE
Payer: COMMERCIAL

## 2020-07-05 VITALS
OXYGEN SATURATION: 97 % | TEMPERATURE: 99 F | SYSTOLIC BLOOD PRESSURE: 115 MMHG | DIASTOLIC BLOOD PRESSURE: 76 MMHG | RESPIRATION RATE: 16 BRPM | HEART RATE: 87 BPM

## 2020-07-05 DIAGNOSIS — Z90.722 ACQUIRED ABSENCE OF OVARIES, BILATERAL: Chronic | ICD-10-CM

## 2020-07-05 DIAGNOSIS — Z98.890 OTHER SPECIFIED POSTPROCEDURAL STATES: Chronic | ICD-10-CM

## 2020-07-05 DIAGNOSIS — Z90.49 ACQUIRED ABSENCE OF OTHER SPECIFIED PARTS OF DIGESTIVE TRACT: Chronic | ICD-10-CM

## 2020-07-05 PROCEDURE — 73562 X-RAY EXAM OF KNEE 3: CPT | Mod: 26,RT

## 2020-07-05 PROCEDURE — 99283 EMERGENCY DEPT VISIT LOW MDM: CPT

## 2020-07-05 NOTE — ED PROVIDER NOTE - NSFOLLOWUPINSTRUCTIONS_ED_ALL_ED_FT
Advance activity as tolerated.  Continue all previously prescribed medications as directed unless otherwise instructed.  Take Tylenol 650mg (Two 325 mg pills) every 4-6 hours as needed for pain or fevers.  Keep extremity elevated and wrapped.  Apply cool compresses to affected area for 15 minutes, 3-4 times per day.  Follow up with your primary care physician in 48-72 hours- bring copies of your results.  Return to the ER for worsening or persistent symptoms, including but not limited to worsening/persistent pain, redness, difficulty walking, falls, numbness, weakness, and/or ANY NEW OR CONCERNING SYMPTOMS. If you have issues obtaining follow up, please call: [phone] to obtain a doctor or specialist who takes your insurance in your area.  You may call [phone] to make an appointment with the internal medicine clinic.

## 2020-07-05 NOTE — ED PROVIDER NOTE - CLINICAL SUMMARY MEDICAL DECISION MAKING FREE TEXT BOX
Pt is a 40 y/o F nonsmoker PMHx lupus, h/o appendectomy,  p/w knee pain 3 days ago -- likely contusion, r/o fracture -- xray, RICE, pain control

## 2020-07-05 NOTE — ED ADULT TRIAGE NOTE - CHIEF COMPLAINT QUOTE
Pt. c/o right knee pain s/p trip and fall. c/o pain and swelling. Some relief with Motrin and Tylenol. Denies LOC or head trauma. pmhx: Lupus

## 2020-07-05 NOTE — ED PROVIDER NOTE - OBJECTIVE STATEMENT
Pt is a 40 y/o F nonsmoker PMHx lupus, h/o appendectomy,  p/w knee pain 3 days ago.  Pt states she tripped over her child's toy, causing her to land onto floor onto bilateral knees.  Pt notes minimal pain at left anterior knee which occurs at times when walking and moderate pain at right anterior knee which worsens with walking and palpation.  Pt notes she is able to walk without assistance.  She notes pain improves with tylenol and motrin.  She reports no head trauma, loc, neck pain, back pain, hip pain, chest pain, lightheadedness, dizziness, numbness, weakness, use of blood thinners, or any other specific complaints.

## 2020-07-05 NOTE — ED PROVIDER NOTE - PROGRESS NOTE DETAILS
MARCO Corral:  Xrays reviewed; no acute emergent findings.  ACE bandage applied. Pt notes improvement in pain.  Pt ambulatory without difficulty without assistance.  Strict return precautions given.  Reassessment performed and plan for discharge discussed with Dr. Frye who agrees with disposition and discharge plan.

## 2020-07-22 ENCOUNTER — APPOINTMENT (OUTPATIENT)
Dept: ORTHOPEDIC SURGERY | Facility: CLINIC | Age: 40
End: 2020-07-22
Payer: COMMERCIAL

## 2020-07-22 VITALS
HEART RATE: 84 BPM | HEIGHT: 62 IN | DIASTOLIC BLOOD PRESSURE: 64 MMHG | SYSTOLIC BLOOD PRESSURE: 98 MMHG | WEIGHT: 145 LBS | BODY MASS INDEX: 26.68 KG/M2

## 2020-07-22 VITALS — TEMPERATURE: 98.4 F

## 2020-07-22 PROCEDURE — 73564 X-RAY EXAM KNEE 4 OR MORE: CPT | Mod: RT

## 2020-07-22 PROCEDURE — 99204 OFFICE O/P NEW MOD 45 MIN: CPT

## 2020-07-22 RX ORDER — CERTOLIZUMAB PEGOL 400 MG
2 X 200 KIT SUBCUTANEOUS
Refills: 0 | Status: ACTIVE | COMMUNITY

## 2020-07-22 RX ORDER — LEVOTHYROXINE SODIUM 137 UG/1
TABLET ORAL
Refills: 0 | Status: ACTIVE | COMMUNITY

## 2020-07-24 NOTE — PHYSICAL EXAM
[de-identified] : Physical examination of the right knee discloses mild tenderness to the inferior aspect of the patella, although active motion is intact, there is mild tenderness with resisted extension of the knee [de-identified] : X-rays taken of the knee and AP lateral skyline and open notch views are nonspecific, very minimal medial joint line narrowing is present

## 2020-07-24 NOTE — HISTORY OF PRESENT ILLNESS
[Stable] : stable [___ wks] : [unfilled] week(s) ago [4] : a minimum pain level of 4/10 [9] : a maximum pain level of 9/10 [Standing] : standing [Walking] : worsened by walking [Intermit.] : ~He/She~ states the symptoms seem to be intermittent [Bending] : worsened by bending [Knee Flexion] : worsened with knee flexion [Rest] : relieved by rest [Ice] : relieved by ice [de-identified] : Pt presents for initial evaluation with pain in her right knee. Evidently on 7/2/2020 pt fell at home and landed on both knees, pt was seen in ER on 7/5/2020 xray taken. Pt is experiencing buckling, is taking Tylenol and Advil for pain which is helpful.Pt states she cannot kneel. Pt has a 14 month old baby, is on hydroxychloroquine. Pt stats she has lupus.. [de-identified] : certain movements  stairs

## 2020-07-24 NOTE — DISCUSSION/SUMMARY
[de-identified] : Patient was informed of her findings and shown her x-rays.  She was placed on meloxicam was advised on rest and ice it if symptoms persist further treatment recommendations will include physical therapy

## 2020-09-30 ENCOUNTER — APPOINTMENT (OUTPATIENT)
Dept: INTERNAL MEDICINE | Facility: CLINIC | Age: 40
End: 2020-09-30
Payer: COMMERCIAL

## 2020-09-30 DIAGNOSIS — M32.9 SYSTEMIC LUPUS ERYTHEMATOSUS, UNSPECIFIED: ICD-10-CM

## 2020-09-30 DIAGNOSIS — E03.9 HYPOTHYROIDISM, UNSPECIFIED: ICD-10-CM

## 2020-09-30 PROCEDURE — 99214 OFFICE O/P EST MOD 30 MIN: CPT | Mod: 95

## 2020-09-30 NOTE — HISTORY OF PRESENT ILLNESS
[Home] : at home, [unfilled] , at the time of the visit. [Medical Office: ([facility])___] : at the medical office located in  [Verbal consent obtained from patient] : the patient, [unfilled] [FreeTextEntry8] : 40F with hypothyroidism, lupus on plaquenil, iron deficiency anemia, migraine on ajovy, anxiety on sertraline who presents for telehealth visit for weakness. \par \par Ms. Martinez tells me she has been feeling weak and light headed for the past two months, she has never fainted and sometimes she feels like she is crashing and she needs to take a nap. She sometimes feels light headed when she gets up quickly. Sometimes she feels nauseated and like throwing up. She has a baby at home and he is 16 months old. She works from home and she is too tired to do what she needs. Nothing has changed since her symptoms started. She is stuck at home and she does not go out unless to the back yard. She wonders if being indoors and the inactivity contributed to this. \par \par She has lupus and takes plaquenil and she sees a doctor at [facility]. She last had lab work in March and things were well controlled and her symptoms started after that. She also uses Cimzia. She is taking levothyroxine as well. She also uses a medication for migraines. She cannot remember her last lupus flare but does not think what she is dealing with now is related to lupus. \par \par No fever or chills, cough or shortness of breath, no chest pain. She has stomach pain at times due to gas. Sometimes it gets better with food. She has been throwing up when her stomach bothers her. It is never bloody and never has coffee ground like material. \par \par Diet: Eats bread with nut butter in the morning, berries. Lunch she eats rice and robles. Dinner is roti with stew or something like that.\par Physical activity: tries to lie down, tries to do yoga stretches - nothing high impact. \par \par She does not really feel that stress is related to her symptoms. Working from home is not really new to her. \par \par She fell and hit her knee three months ago. She couldn't walk and she has been less active. She thinks that this cycle of inactivity might be what is causing her fatigue. Of note she has a history of iron deficiency anemia and is not currently taking iron supplements .

## 2020-09-30 NOTE — ASSESSMENT
[FreeTextEntry1] : 40F with lupus, migraine, hypothyroidism presents with fatigue for two months that started after a fall onto her knees leading to her becoming less active and now feeling fatigued possibly to being stuck inside with limited chances to participate in exercise/physical activity. \par \par 1. fatigue\par -check TSH; patient reported some weight gain. Check labs to see if medication needs adjustment\par -recommended daily walks even for 5 minutes as a start. Discussed how knee soft tissues have a hard time healing without continued movement and that physical activity and strengthening muscles might actually improve pain and mobility.\par -check CBC; history of iron deficiency anemia not currently on iron\par -lupus; follows regularly with rheumatologist at [facility] certain this is unrelated to lupus however if no improvement with current plan would recommend earlier f/u with rheumatologist\par -patient requested check covid ab and will check a1c, lipid profile, and cmp since we are drawing blood and no recent blood work in the cart. \par \par Patient agreeable to above plan; will f/u labs and if no improvement will come for in person visit. Will make appt on RN schedule to get flu shot as well.

## 2020-09-30 NOTE — PHYSICAL EXAM
[No Acute Distress] : no acute distress [Well Nourished] : well nourished [Well Developed] : well developed [Well-Appearing] : well-appearing [Normal Sclera/Conjunctiva] : normal sclera/conjunctiva [Normal Outer Ear/Nose] : the outer ears and nose were normal in appearance [No Respiratory Distress] : no respiratory distress  [No Accessory Muscle Use] : no accessory muscle use [Normal Affect] : the affect was normal [Alert and Oriented x3] : oriented to person, place, and time [Normal Mood] : the mood was normal [Normal Insight/Judgement] : insight and judgment were intact [de-identified] : Limited phsycial exam as format of visit was via telehealth [de-identified] : Speaking full sentences without any signs of respiratory distress

## 2020-09-30 NOTE — REVIEW OF SYSTEMS
[Nausea] : nausea [Vomiting] : vomiting [Joint Pain] : joint pain [Dizziness] : dizziness [Fever] : no fever [Chills] : no chills [Chest Pain] : no chest pain [Shortness Of Breath] : no shortness of breath [Cough] : no cough [Dyspnea on Exertion] : no dyspnea on exertion [Abdominal Pain] : no abdominal pain [Fainting] : no fainting [Anxiety] : no anxiety [Depression] : no depression [FreeTextEntry9] : Knee pain see HPI

## 2020-10-06 LAB
ALBUMIN SERPL ELPH-MCNC: 4.1 G/DL
ALP BLD-CCNC: 86 U/L
ALT SERPL-CCNC: 16 U/L
ANION GAP SERPL CALC-SCNC: 12 MMOL/L
AST SERPL-CCNC: 13 U/L
BASOPHILS # BLD AUTO: 0.04 K/UL
BASOPHILS NFR BLD AUTO: 0.6 %
BILIRUB SERPL-MCNC: 0.3 MG/DL
BUN SERPL-MCNC: 10 MG/DL
CALCIUM SERPL-MCNC: 9.2 MG/DL
CHLORIDE SERPL-SCNC: 104 MMOL/L
CHOLEST SERPL-MCNC: 195 MG/DL
CHOLEST/HDLC SERPL: 4.7 RATIO
CO2 SERPL-SCNC: 24 MMOL/L
CREAT SERPL-MCNC: 0.67 MG/DL
EOSINOPHIL # BLD AUTO: 0.1 K/UL
EOSINOPHIL NFR BLD AUTO: 1.6 %
ESTIMATED AVERAGE GLUCOSE: 100 MG/DL
GLUCOSE SERPL-MCNC: 97 MG/DL
HBA1C MFR BLD HPLC: 5.1 %
HCT VFR BLD CALC: 39.3 %
HDLC SERPL-MCNC: 41 MG/DL
HGB BLD-MCNC: 11.9 G/DL
IMM GRANULOCYTES NFR BLD AUTO: 0.3 %
LDLC SERPL CALC-MCNC: 112 MG/DL
LYMPHOCYTES # BLD AUTO: 2.16 K/UL
LYMPHOCYTES NFR BLD AUTO: 34.2 %
MAN DIFF?: NORMAL
MCHC RBC-ENTMCNC: 22.3 PG
MCHC RBC-ENTMCNC: 30.3 GM/DL
MCV RBC AUTO: 73.7 FL
MONOCYTES # BLD AUTO: 0.37 K/UL
MONOCYTES NFR BLD AUTO: 5.9 %
NEUTROPHILS # BLD AUTO: 3.63 K/UL
NEUTROPHILS NFR BLD AUTO: 57.4 %
PLATELET # BLD AUTO: 330 K/UL
POTASSIUM SERPL-SCNC: 4.5 MMOL/L
PROT SERPL-MCNC: 6.8 G/DL
RBC # BLD: 5.33 M/UL
RBC # FLD: 16.3 %
SARS-COV-2 IGG SERPL IA-ACNC: 0.1 INDEX
SARS-COV-2 IGG SERPL QL IA: NEGATIVE
SODIUM SERPL-SCNC: 141 MMOL/L
TRIGL SERPL-MCNC: 210 MG/DL
TSH SERPL-ACNC: 3 UIU/ML
WBC # FLD AUTO: 6.32 K/UL

## 2020-10-29 ENCOUNTER — APPOINTMENT (OUTPATIENT)
Dept: INTERNAL MEDICINE | Facility: CLINIC | Age: 40
End: 2020-10-29
Payer: COMMERCIAL

## 2020-10-29 PROCEDURE — 90686 IIV4 VACC NO PRSV 0.5 ML IM: CPT

## 2020-10-29 PROCEDURE — 99072 ADDL SUPL MATRL&STAF TM PHE: CPT

## 2020-10-29 PROCEDURE — G0008: CPT

## 2020-12-21 PROBLEM — Z87.09 HISTORY OF UPPER RESPIRATORY INFECTION: Status: RESOLVED | Noted: 2019-05-11 | Resolved: 2020-12-21

## 2021-07-13 ENCOUNTER — OUTPATIENT (OUTPATIENT)
Dept: OUTPATIENT SERVICES | Facility: HOSPITAL | Age: 41
LOS: 1 days | Discharge: STILL ADMITTED OR ADMINISTRATIVE | End: 2021-07-13

## 2021-07-13 DIAGNOSIS — Z98.890 OTHER SPECIFIED POSTPROCEDURAL STATES: Chronic | ICD-10-CM

## 2021-07-13 DIAGNOSIS — Z90.49 ACQUIRED ABSENCE OF OTHER SPECIFIED PARTS OF DIGESTIVE TRACT: Chronic | ICD-10-CM

## 2021-07-13 DIAGNOSIS — Z90.722 ACQUIRED ABSENCE OF OVARIES, BILATERAL: Chronic | ICD-10-CM

## 2021-07-14 DIAGNOSIS — F33.1 MAJOR DEPRESSIVE DISORDER, RECURRENT, MODERATE: ICD-10-CM

## 2021-07-14 DIAGNOSIS — F41.1 GENERALIZED ANXIETY DISORDER: ICD-10-CM

## 2021-07-22 ENCOUNTER — APPOINTMENT (OUTPATIENT)
Dept: PSYCHIATRY | Facility: CLINIC | Age: 41
End: 2021-07-22

## 2021-08-10 ENCOUNTER — OUTPATIENT (OUTPATIENT)
Dept: OUTPATIENT SERVICES | Facility: HOSPITAL | Age: 41
LOS: 1 days | Discharge: HOME | End: 2021-08-10

## 2021-08-10 DIAGNOSIS — Z90.49 ACQUIRED ABSENCE OF OTHER SPECIFIED PARTS OF DIGESTIVE TRACT: Chronic | ICD-10-CM

## 2021-08-10 DIAGNOSIS — Z98.890 OTHER SPECIFIED POSTPROCEDURAL STATES: Chronic | ICD-10-CM

## 2021-08-10 DIAGNOSIS — Z90.722 ACQUIRED ABSENCE OF OVARIES, BILATERAL: Chronic | ICD-10-CM

## 2021-08-12 PROCEDURE — 90792 PSYCH DIAG EVAL W/MED SRVCS: CPT | Mod: 95

## 2021-09-10 DIAGNOSIS — F33.1 MAJOR DEPRESSIVE DISORDER, RECURRENT, MODERATE: ICD-10-CM

## 2021-09-10 DIAGNOSIS — F41.1 GENERALIZED ANXIETY DISORDER: ICD-10-CM

## 2021-09-26 ENCOUNTER — EMERGENCY (EMERGENCY)
Facility: HOSPITAL | Age: 41
LOS: 1 days | Discharge: HOME | End: 2021-09-26
Attending: EMERGENCY MEDICINE | Admitting: EMERGENCY MEDICINE
Payer: COMMERCIAL

## 2021-09-26 VITALS
HEIGHT: 62 IN | TEMPERATURE: 98 F | SYSTOLIC BLOOD PRESSURE: 122 MMHG | DIASTOLIC BLOOD PRESSURE: 83 MMHG | HEART RATE: 87 BPM | RESPIRATION RATE: 17 BRPM | OXYGEN SATURATION: 100 %

## 2021-09-26 VITALS
SYSTOLIC BLOOD PRESSURE: 123 MMHG | DIASTOLIC BLOOD PRESSURE: 82 MMHG | OXYGEN SATURATION: 97 % | RESPIRATION RATE: 16 BRPM | HEART RATE: 80 BPM | TEMPERATURE: 98 F

## 2021-09-26 DIAGNOSIS — Z90.722 ACQUIRED ABSENCE OF OVARIES, BILATERAL: Chronic | ICD-10-CM

## 2021-09-26 DIAGNOSIS — Z98.890 OTHER SPECIFIED POSTPROCEDURAL STATES: Chronic | ICD-10-CM

## 2021-09-26 DIAGNOSIS — Z90.49 ACQUIRED ABSENCE OF OTHER SPECIFIED PARTS OF DIGESTIVE TRACT: Chronic | ICD-10-CM

## 2021-09-26 LAB
APPEARANCE UR: CLEAR — SIGNIFICANT CHANGE UP
BILIRUB UR-MCNC: NEGATIVE — SIGNIFICANT CHANGE UP
COLOR SPEC: COLORLESS — SIGNIFICANT CHANGE UP
DIFF PNL FLD: NEGATIVE — SIGNIFICANT CHANGE UP
GLUCOSE UR QL: NEGATIVE — SIGNIFICANT CHANGE UP
KETONES UR-MCNC: NEGATIVE — SIGNIFICANT CHANGE UP
LEUKOCYTE ESTERASE UR-ACNC: NEGATIVE — SIGNIFICANT CHANGE UP
NITRITE UR-MCNC: NEGATIVE — SIGNIFICANT CHANGE UP
PH UR: 6.5 — SIGNIFICANT CHANGE UP (ref 5–8)
PROT UR-MCNC: NEGATIVE — SIGNIFICANT CHANGE UP
SP GR SPEC: 1.01 — SIGNIFICANT CHANGE UP (ref 1–1.05)
UROBILINOGEN FLD QL: SIGNIFICANT CHANGE UP

## 2021-09-26 PROCEDURE — 99284 EMERGENCY DEPT VISIT MOD MDM: CPT

## 2021-09-26 PROCEDURE — 73502 X-RAY EXAM HIP UNI 2-3 VIEWS: CPT | Mod: 26,LT

## 2021-09-26 RX ORDER — POLYETHYLENE GLYCOL 3350 17 G/17G
7 POWDER, FOR SOLUTION ORAL
Qty: 35 | Refills: 0
Start: 2021-09-26 | End: 2021-09-30

## 2021-09-26 RX ORDER — OXYCODONE HYDROCHLORIDE 5 MG/1
1 TABLET ORAL
Qty: 15 | Refills: 0
Start: 2021-09-26

## 2021-09-26 RX ORDER — POLYETHYLENE GLYCOL 3350 17 G/17G
17 POWDER, FOR SOLUTION ORAL
Qty: 170 | Refills: 0
Start: 2021-09-26 | End: 2021-10-05

## 2021-09-26 RX ORDER — CYCLOBENZAPRINE HYDROCHLORIDE 10 MG/1
5 TABLET, FILM COATED ORAL ONCE
Refills: 0 | Status: COMPLETED | OUTPATIENT
Start: 2021-09-26 | End: 2021-09-26

## 2021-09-26 RX ORDER — OXYCODONE HYDROCHLORIDE 5 MG/1
1 TABLET ORAL
Qty: 15 | Refills: 0
Start: 2021-09-26 | End: 2021-09-30

## 2021-09-26 RX ORDER — IBUPROFEN 200 MG
400 TABLET ORAL ONCE
Refills: 0 | Status: COMPLETED | OUTPATIENT
Start: 2021-09-26 | End: 2021-09-26

## 2021-09-26 RX ORDER — ACETAMINOPHEN 500 MG
650 TABLET ORAL EVERY 6 HOURS
Refills: 0 | Status: DISCONTINUED | OUTPATIENT
Start: 2021-09-26 | End: 2021-09-30

## 2021-09-26 RX ORDER — LIDOCAINE 4 G/100G
1 CREAM TOPICAL ONCE
Refills: 0 | Status: COMPLETED | OUTPATIENT
Start: 2021-09-26 | End: 2021-09-26

## 2021-09-26 RX ORDER — OXYCODONE HYDROCHLORIDE 5 MG/1
5 TABLET ORAL ONCE
Refills: 0 | Status: DISCONTINUED | OUTPATIENT
Start: 2021-09-26 | End: 2021-09-26

## 2021-09-26 RX ADMIN — Medication 400 MILLIGRAM(S): at 14:30

## 2021-09-26 RX ADMIN — OXYCODONE HYDROCHLORIDE 5 MILLIGRAM(S): 5 TABLET ORAL at 15:50

## 2021-09-26 RX ADMIN — CYCLOBENZAPRINE HYDROCHLORIDE 5 MILLIGRAM(S): 10 TABLET, FILM COATED ORAL at 14:30

## 2021-09-26 RX ADMIN — Medication 650 MILLIGRAM(S): at 14:30

## 2021-09-26 RX ADMIN — LIDOCAINE 1 PATCH: 4 CREAM TOPICAL at 14:30

## 2021-09-26 NOTE — ED PROVIDER NOTE - PATIENT PORTAL LINK FT
You can access the FollowMyHealth Patient Portal offered by [facility] by registering at the following website: [email or web address]. By joining [facility]’s FollowMyHealth portal, you will also be able to view your health information using other applications (apps) compatible with our system.

## 2021-09-26 NOTE — ED ADULT NURSE NOTE - OBJECTIVE STATEMENT
41 year old female received to intake 10C AAOx4 ambulatory. Pt c/o left lower back pain with gradual onset and worsening today prompting ED visit. Pt denies radiation to lower extremities. Pt rates pain 7/10 at rest, 9/10 with movement. Pt denies fall/trauma. Pt denies headache, dizziness, cp, sob, n/v/d, fever, chills, numbness, tingling. Respirations even and unlabored. Skin intact warm and dry. VS as noted. Medicated per MD orders. Bed in lowest position, call bell within reach.

## 2021-09-26 NOTE — ED ADULT TRIAGE NOTE - CHIEF COMPLAINT QUOTE
Complaining of mid lower back pain that radiates to left buttock x 2 weeks. States pain does radiate down left leg when she goes from sitting to standing position. Pain is not improving so states she came to ED for treatment. Used salonpas patches, motrin, tylenol but has had little improvement. History of lupus, sciatica.

## 2021-09-26 NOTE — ED PROVIDER NOTE - ATTENDING CONTRIBUTION TO CARE
41F h/o sciatica lupus pw atraumatic L low back pain x 1 week, rad down lat aspect of thigh, worse when standing up from sitting.  Taking tylenol / motrin w/o impv.  no fever vomiting or abd pain.  No trauma.  Distinct from usual sciatica.   Pos SLR L side.  No spinal ttp.  No hips ttp.  Plan check urine, UCG, xray hip per pt request, reass.  VS:  unremarkable    GEN - mild distress L hip pain pain;   well appearing;   A+O x3   HEAD - NC/AT     ENT - PEERL, EOMI, mucous membranes    moist , no discharge      NECK: Neck supple, non-tender without lymphadenopathy, no masses, no JVD  PULM - CTA b/l,  symmetric breath sounds  COR -  normal heart sounds    ABD - , ND, NT, soft,  BACK - no CVA tenderness, nontender spine   (+)SI mild ttp and PVM spasm no deformity  EXTREMS - no edema, no deformity, warm and well perfused    SKIN - no rash    or bruising      NEUROLOGIC - alert, face symmetric, speech fluent, sensation nl, motor no focal deficit.

## 2021-09-26 NOTE — ED PROVIDER NOTE - CLINICAL SUMMARY MEDICAL DECISION MAKING FREE TEXT BOX
41F PMH sciatica, SLE, CC lower back pain for the past week, atraumatic in nature. Given clinical hx and physical, strong suspicion for MSK related back pain, will eval also for possible fx w/ x ray.   No lab work warranted at this time. Will re-eval after tx

## 2021-09-26 NOTE — ED PROVIDER NOTE - NSFOLLOWUPINSTRUCTIONS_ED_ALL_ED_FT
Today you were evaluated in the ED for back pain.  Your x rays came back and showed ******.     You can take Tylenol and Motrin every 6 hours for pain as needed.       Back pain is very common in adults. The cause of back pain is rarely dangerous and the pain often gets better over time. The cause of your back pain may not be known and may include strain of muscles or ligaments, degeneration of the spinal disks, or arthritis. Occasionally the pain may radiate down your leg(s). Over-the-counter medicines to reduce pain and inflammation are often the most helpful. Stretching and remaining active frequently helps the healing process.     Low Back Strain  A strain is a stretch or tear in a muscle or the strong cords of tissue that attach muscle to bone (tendons). Strains of the lower back (lumbar spine) are a common cause of low back pain. A strain occurs when muscles or tendons are torn or are stretched beyond their limits. The muscles may become inflamed, resulting in pain and sudden muscle tightening (spasms). A strain can happen suddenly due to an injury (trauma), or it can develop gradually due to overuse.    What increases the risk?  The following factors may increase your risk of getting this condition:  Playing contact sports.  Participating in sports or activities that put excessive stress on the back and require a lot of bending and twisting, including:  Lifting weights or heavy objects, Gymnastics, Soccer, Figure skating, Snowboarding, Being overweight or obese, Having poor strength and flexibility.    What are the signs or symptoms?  Symptoms of this condition may include:  Sharp or dull pain in the lower back that does not go away. Pain may extend to the buttocks.  Stiffness.  Limited range of motion.  Inability to stand up straight due to stiffness or pain.  Muscle spasms.    How is this diagnosed?  This condition may be diagnosed based on:  Your symptoms, Your medical history, A physical exam, Your health care provider may push on certain areas of your back to determine the source of your pain. You may be asked to bend forward, backward, and side to side to assess the severity of your pain and your range of motion.  Imaging tests, such as:  X-rays, MRI.    How is this treated?  Treatment for this condition may include:  Applying heat and cold to the affected area.  Medicines to help relieve pain and to relax your muscles (muscle relaxants).  NSAIDs to help reduce swelling and discomfort.  Physical therapy.  When your symptoms improve, it is important to gradually return to your normal routine as soon as possible to reduce pain, avoid stiffness, and avoid loss of muscle strength. Generally, symptoms should improve within 6 weeks of treatment. However, recovery time varies.    Follow these instructions at home:  Managing pain, stiffness, and swelling     If directed, apply ice to the injured area during the first 24 hours after your injury.  Put ice in a plastic bag.  Place a towel between your skin and the bag.  Leave the ice on for 20 minutes, 2–3 times a day.  If directed, apply heat to the affected area as often as told by your health care provider. Use the heat source that your health care provider recommends, such as a moist heat pack or a heating pad.  Place a towel between your skin and the heat source.  Leave the heat on for 20–30 minutes.  Remove the heat if your skin turns bright red. This is especially important if you are unable to feel pain, heat, or cold. You may have a greater risk of getting burned.  Activity     Rest and return to your normal activities as told by your health care provider. Ask your health care provider what activities are safe for you.  Avoid activities that take a lot of effort (are strenuous) for as long as told by your health care provider.  Do exercises as told by your health care provider.  General instructions     Take over-the-counter and prescription medicines only as told by your health care provider.  If you have questions or concerns about safety while taking pain medicine, talk with your health care provider.  Do not drive or operate heavy machinery until you know how your pain medicine affects you.  Do not use any tobacco products, such as cigarettes, chewing tobacco, and e-cigarettes. Tobacco can delay bone healing. If you need help quitting, ask your health care provider.  Keep all follow-up visits as told by your health care provider. This is important.  How is this prevented?  Image Image Image Image Image   Warm up and stretch before being active.  Cool down and stretch after being active.  Give your body time to rest between periods of activity.  Avoid:  Being physically inactive for long periods at a time.  Exercising or playing sports when you are tired or in pain.  Use correct form when playing sports and lifting heavy objects.  Use good posture when sitting and standing.  Maintain a healthy weight.  Sleep on a mattress with medium firmness to support your back.  Make sure to use equipment that fits you, including shoes that fit well.  Be safe and responsible while being active to avoid falls.  Do at least 150 minutes of moderate-intensity exercise each week, such as brisk walking or water aerobics. Try a form of exercise that takes stress off your back, such as swimming or stationary cycling.  Maintain physical fitness, including:  Strength.  Flexibility.  Cardiovascular fitness.  Endurance.  Contact a health care provider if:  Your back pain does not improve after 6 weeks of treatment.  Your symptoms get worse.  Get help right away if:  Your back pain is severe.  You are unable to stand or walk.  You develop pain in your legs.  You develop weakness in your buttocks or legs.  You have difficulty controlling when you urinate or when you have a bowel movement.  This information is not intended to replace advice given to you by your health care provider. Make sure you discuss any questions you have with your health care provider.      SEEK IMMEDIATE MEDICAL CARE IF YOU HAVE ANY OF THE FOLLOWING SYMPTOMS: bowel or bladder control problems, unusual weakness or numbness in your arms or legs, nausea or vomiting, abdominal pain, fever, dizziness/lightheadedness. Today you were evaluated in the ED for back pain.  Your x rays came back and showed no acute fracture or dislocation.     You can take percocet every 8 hours as needed. One side effect of the pain medicine is constipation, please take Miralex as well every day while you are taking the percocet.     Please follow up with your PCP as well if the pain is not improving.     Back pain is very common in adults. The cause of back pain is rarely dangerous and the pain often gets better over time. The cause of your back pain may not be known and may include strain of muscles or ligaments, degeneration of the spinal disks, or arthritis. Occasionally the pain may radiate down your leg(s). Over-the-counter medicines to reduce pain and inflammation are often the most helpful. Stretching and remaining active frequently helps the healing process.     Low Back Strain  A strain is a stretch or tear in a muscle or the strong cords of tissue that attach muscle to bone (tendons). Strains of the lower back (lumbar spine) are a common cause of low back pain. A strain occurs when muscles or tendons are torn or are stretched beyond their limits. The muscles may become inflamed, resulting in pain and sudden muscle tightening (spasms). A strain can happen suddenly due to an injury (trauma), or it can develop gradually due to overuse.    What increases the risk?  The following factors may increase your risk of getting this condition:  Playing contact sports.  Participating in sports or activities that put excessive stress on the back and require a lot of bending and twisting, including:  Lifting weights or heavy objects, Gymnastics, Soccer, Figure skating, Snowboarding, Being overweight or obese, Having poor strength and flexibility.    What are the signs or symptoms?  Symptoms of this condition may include:  Sharp or dull pain in the lower back that does not go away. Pain may extend to the buttocks.  Stiffness.  Limited range of motion.  Inability to stand up straight due to stiffness or pain.  Muscle spasms.    How is this diagnosed?  This condition may be diagnosed based on:  Your symptoms, Your medical history, A physical exam, Your health care provider may push on certain areas of your back to determine the source of your pain. You may be asked to bend forward, backward, and side to side to assess the severity of your pain and your range of motion.  Imaging tests, such as:  X-rays, MRI.    How is this treated?  Treatment for this condition may include:  Applying heat and cold to the affected area.  Medicines to help relieve pain and to relax your muscles (muscle relaxants).  NSAIDs to help reduce swelling and discomfort.  Physical therapy.  When your symptoms improve, it is important to gradually return to your normal routine as soon as possible to reduce pain, avoid stiffness, and avoid loss of muscle strength. Generally, symptoms should improve within 6 weeks of treatment. However, recovery time varies.    Follow these instructions at home:  Managing pain, stiffness, and swelling     If directed, apply ice to the injured area during the first 24 hours after your injury.  Put ice in a plastic bag.  Place a towel between your skin and the bag.  Leave the ice on for 20 minutes, 2–3 times a day.  If directed, apply heat to the affected area as often as told by your health care provider. Use the heat source that your health care provider recommends, such as a moist heat pack or a heating pad.  Place a towel between your skin and the heat source.  Leave the heat on for 20–30 minutes.  Remove the heat if your skin turns bright red. This is especially important if you are unable to feel pain, heat, or cold. You may have a greater risk of getting burned.  Activity     Rest and return to your normal activities as told by your health care provider. Ask your health care provider what activities are safe for you.  Avoid activities that take a lot of effort (are strenuous) for as long as told by your health care provider.  Do exercises as told by your health care provider.  General instructions     Take over-the-counter and prescription medicines only as told by your health care provider.  If you have questions or concerns about safety while taking pain medicine, talk with your health care provider.  Do not drive or operate heavy machinery until you know how your pain medicine affects you.  Do not use any tobacco products, such as cigarettes, chewing tobacco, and e-cigarettes. Tobacco can delay bone healing. If you need help quitting, ask your health care provider.  Keep all follow-up visits as told by your health care provider. This is important.  How is this prevented?  Image Image Image Image Image   Warm up and stretch before being active.  Cool down and stretch after being active.  Give your body time to rest between periods of activity.  Avoid:  Being physically inactive for long periods at a time.  Exercising or playing sports when you are tired or in pain.  Use correct form when playing sports and lifting heavy objects.  Use good posture when sitting and standing.  Maintain a healthy weight.  Sleep on a mattress with medium firmness to support your back.  Make sure to use equipment that fits you, including shoes that fit well.  Be safe and responsible while being active to avoid falls.  Do at least 150 minutes of moderate-intensity exercise each week, such as brisk walking or water aerobics. Try a form of exercise that takes stress off your back, such as swimming or stationary cycling.  Maintain physical fitness, including:  Strength.  Flexibility.  Cardiovascular fitness.  Endurance.  Contact a health care provider if:  Your back pain does not improve after 6 weeks of treatment.  Your symptoms get worse.  Get help right away if:  Your back pain is severe.  You are unable to stand or walk.  You develop pain in your legs.  You develop weakness in your buttocks or legs.  You have difficulty controlling when you urinate or when you have a bowel movement.  This information is not intended to replace advice given to you by your health care provider. Make sure you discuss any questions you have with your health care provider.      SEEK IMMEDIATE MEDICAL CARE IF YOU HAVE ANY OF THE FOLLOWING SYMPTOMS: bowel or bladder control problems, unusual weakness or numbness in your arms or legs, nausea or vomiting, abdominal pain, fever, dizziness/lightheadedness. Today you were evaluated in the ED for back pain.  Your x rays came back and showed no acute fracture or dislocation.     You can take oxycodone hours as needed. One side effect of the pain medicine is constipation, please take Miralax as well every day while you are taking the percocet.     Please follow up with Spine surgery - see list.     Back pain is very common in adults. The cause of back pain is rarely dangerous and the pain often gets better over time. The cause of your back pain may not be known and may include strain of muscles or ligaments, degeneration of the spinal disks, or arthritis. Occasionally the pain may radiate down your leg(s). Over-the-counter medicines to reduce pain and inflammation are often the most helpful. Stretching and remaining active frequently helps the healing process.     Low Back Strain  A strain is a stretch or tear in a muscle or the strong cords of tissue that attach muscle to bone (tendons). Strains of the lower back (lumbar spine) are a common cause of low back pain. A strain occurs when muscles or tendons are torn or are stretched beyond their limits. The muscles may become inflamed, resulting in pain and sudden muscle tightening (spasms). A strain can happen suddenly due to an injury (trauma), or it can develop gradually due to overuse.    What increases the risk?  The following factors may increase your risk of getting this condition:  Playing contact sports.  Participating in sports or activities that put excessive stress on the back and require a lot of bending and twisting, including:  Lifting weights or heavy objects, Gymnastics, Soccer, Figure skating, Snowboarding, Being overweight or obese, Having poor strength and flexibility.    What are the signs or symptoms?  Symptoms of this condition may include:  Sharp or dull pain in the lower back that does not go away. Pain may extend to the buttocks.  Stiffness.  Limited range of motion.  Inability to stand up straight due to stiffness or pain.  Muscle spasms.    How is this diagnosed?  This condition may be diagnosed based on:  Your symptoms, Your medical history, A physical exam, Your health care provider may push on certain areas of your back to determine the source of your pain. You may be asked to bend forward, backward, and side to side to assess the severity of your pain and your range of motion.  Imaging tests, such as:  X-rays, MRI.    How is this treated?  Treatment for this condition may include:  Applying heat and cold to the affected area.  Medicines to help relieve pain and to relax your muscles (muscle relaxants).  NSAIDs to help reduce swelling and discomfort.  Physical therapy.  When your symptoms improve, it is important to gradually return to your normal routine as soon as possible to reduce pain, avoid stiffness, and avoid loss of muscle strength. Generally, symptoms should improve within 6 weeks of treatment. However, recovery time varies.    Follow these instructions at home:  Managing pain, stiffness, and swelling     If directed, apply ice to the injured area during the first 24 hours after your injury.  Put ice in a plastic bag.  Place a towel between your skin and the bag.  Leave the ice on for 20 minutes, 2–3 times a day.  If directed, apply heat to the affected area as often as told by your health care provider. Use the heat source that your health care provider recommends, such as a moist heat pack or a heating pad.  Place a towel between your skin and the heat source.  Leave the heat on for 20–30 minutes.  Remove the heat if your skin turns bright red. This is especially important if you are unable to feel pain, heat, or cold. You may have a greater risk of getting burned.  Activity     Rest and return to your normal activities as told by your health care provider. Ask your health care provider what activities are safe for you.  Avoid activities that take a lot of effort (are strenuous) for as long as told by your health care provider.  Do exercises as told by your health care provider.  General instructions     Take over-the-counter and prescription medicines only as told by your health care provider.  If you have questions or concerns about safety while taking pain medicine, talk with your health care provider.  Do not drive or operate heavy machinery until you know how your pain medicine affects you.  Do not use any tobacco products, such as cigarettes, chewing tobacco, and e-cigarettes. Tobacco can delay bone healing. If you need help quitting, ask your health care provider.  Keep all follow-up visits as told by your health care provider. This is important.  How is this prevented?  Image Image Image Image Image   Warm up and stretch before being active.  Cool down and stretch after being active.  Give your body time to rest between periods of activity.  Avoid:  Being physically inactive for long periods at a time.  Exercising or playing sports when you are tired or in pain.  Use correct form when playing sports and lifting heavy objects.  Use good posture when sitting and standing.  Maintain a healthy weight.  Sleep on a mattress with medium firmness to support your back.  Make sure to use equipment that fits you, including shoes that fit well.  Be safe and responsible while being active to avoid falls.  Do at least 150 minutes of moderate-intensity exercise each week, such as brisk walking or water aerobics. Try a form of exercise that takes stress off your back, such as swimming or stationary cycling.  Maintain physical fitness, including:  Strength.  Flexibility.  Cardiovascular fitness.  Endurance.  Contact a health care provider if:  Your back pain does not improve after 6 weeks of treatment.  Your symptoms get worse.  Get help right away if:  Your back pain is severe.  You are unable to stand or walk.  You develop pain in your legs.  You develop weakness in your buttocks or legs.  You have difficulty controlling when you urinate or when you have a bowel movement.  This information is not intended to replace advice given to you by your health care provider. Make sure you discuss any questions you have with your health care provider.      SEEK IMMEDIATE MEDICAL CARE IF YOU HAVE ANY OF THE FOLLOWING SYMPTOMS: bowel or bladder control problems, unusual weakness or numbness in your arms or legs, nausea or vomiting, abdominal pain, fever, dizziness/lightheadedness.

## 2021-09-26 NOTE — ED PROVIDER NOTE - NSICDXPASTMEDICALHX_GEN_ALL_CORE_FT
PAST MEDICAL HISTORY:  Endometriosis Stage 4    Hashimoto's disease     Hypothyroid     Lupus     Lupus     Migraine     Pregnant

## 2021-09-26 NOTE — ED PROVIDER NOTE - NSICDXPASTSURGICALHX_GEN_ALL_CORE_FT
PAST SURGICAL HISTORY:  H/O bilateral oophorectomy     H/O laparoscopy     History of appendectomy ruptured

## 2021-09-26 NOTE — ED ADULT NURSE NOTE - NSIMPLEMENTINTERV_GEN_ALL_ED
Implemented All Fall Risk Interventions:  [place] to call system. Call bell, personal items and telephone within reach. Instruct patient to call for assistance. Room bathroom lighting operational. Non-slip footwear when patient is off stretcher. Physically safe environment: no spills, clutter or unnecessary equipment. Stretcher in lowest position, wheels locked, appropriate side rails in place. Provide visual cue, wrist band, yellow gown, etc. Monitor gait and stability. Monitor for mental status changes and reorient to person, place, and time. Review medications for side effects contributing to fall risk. Reinforce activity limits and safety measures with patient and family.

## 2021-09-26 NOTE — ED PROVIDER NOTE - OBJECTIVE STATEMENT
41F PMH sciatica, SLE, CC back pain past 7 days. Pain atraumatic, located on left lower side, radiates down outer thigh, stops at knee. worse with getting up from seated position or laying down.  Has been taking Tylenol and Motrin for pain but to little relief. Presenting today for increased pain.   -Denies N/V, CP, SOB, Fevers

## 2021-09-26 NOTE — ED PROVIDER NOTE - PROGRESS NOTE DETAILS
Veronique PGY 1   Back pain slightly improved after medication will further eval in apporx 20min  Pain still elicited w/ moving up DD ED ATTG: pt feeling much better will rx oxycodone and miralax and d/c home with spine f/u.

## 2021-09-28 LAB
CULTURE RESULTS: SIGNIFICANT CHANGE UP
SPECIMEN SOURCE: SIGNIFICANT CHANGE UP

## 2021-09-30 ENCOUNTER — APPOINTMENT (OUTPATIENT)
Dept: ORTHOPEDIC SURGERY | Facility: CLINIC | Age: 41
End: 2021-09-30
Payer: COMMERCIAL

## 2021-09-30 DIAGNOSIS — M54.42 LUMBAGO WITH SCIATICA, LEFT SIDE: ICD-10-CM

## 2021-09-30 DIAGNOSIS — M54.16 RADICULOPATHY, LUMBAR REGION: ICD-10-CM

## 2021-09-30 DIAGNOSIS — Z87.898 PERSONAL HISTORY OF OTHER SPECIFIED CONDITIONS: ICD-10-CM

## 2021-09-30 DIAGNOSIS — Z86.69 PERSONAL HISTORY OF OTHER DISEASES OF THE NERVOUS SYSTEM AND SENSE ORGANS: ICD-10-CM

## 2021-09-30 DIAGNOSIS — M32.9 SYSTEMIC LUPUS ERYTHEMATOSUS, UNSPECIFIED: ICD-10-CM

## 2021-09-30 PROCEDURE — 72100 X-RAY EXAM L-S SPINE 2/3 VWS: CPT

## 2021-09-30 PROCEDURE — 99215 OFFICE O/P EST HI 40 MIN: CPT

## 2021-09-30 RX ORDER — IBUPROFEN 800 MG/1
800 TABLET, FILM COATED ORAL
Qty: 90 | Refills: 0 | Status: ACTIVE | COMMUNITY
Start: 2021-09-30 | End: 1900-01-01

## 2021-10-01 PROBLEM — M32.9 LUPUS: Status: ACTIVE | Noted: 2021-10-01

## 2021-10-01 PROBLEM — Z86.69 HISTORY OF MIGRAINE HEADACHES: Status: RESOLVED | Noted: 2021-10-01 | Resolved: 2021-10-01

## 2021-10-01 PROBLEM — Z87.898 HISTORY OF HEARTBURN: Status: RESOLVED | Noted: 2021-10-01 | Resolved: 2021-10-01

## 2021-10-01 NOTE — HISTORY OF PRESENT ILLNESS
[de-identified] : This 41-year-old  with a 6-year history of lupus had her first episode of spine related symptoms of significance 2 years ago with a long history of minor back problems before that.  She is currently packing to move and a week ago had a significant increase in her left-sided lower back pain that she grades as a 7 with some radiation to the left buttock but no further down the leg that she grades as an 8.  She has not had associated neurologic symptoms of numbness, paresthesias or weakness.  The pain is worse with coughing, sneezing and forcing to move her bowels and she has night pain.  The pain is worse sitting.  She is no worse standing or walking.  Treatment has been heat and lidocaine patches without relief.  She was given a prescription for oxycodone in the emergency room and is taking Tylenol and occasional Advil. [Pain Location] : pain [Worsening] : worsening [8] : a maximum pain level of 8/10 [Bending] : worsened by bending [Lifting] : worsened by lifting [Prolonged Sitting] : worsened by prolonged sitting [Prolonged Standing] : not worsened by prolonged standing [Sitting] : worsened by sitting [Standing] : not worsened by standing [Walking] : worsened by walking

## 2021-10-01 NOTE — PHYSICAL EXAM
[de-identified] : She is fully alert and oriented with a normal mood and affect.  She is in no acute distress as I take the history.  She is overweight.  She ambulates with a guarded gait secondary to lower back pain.  She can tiptoe and heel walk.  There are no cutaneous abnormalities or palpable bony defects of the spine.  There is no evidence of shortness of breath or respiratory distress.  There is no paravertebral muscle spasm or trochanteric tenderness.  There is a trace of sciatic notch sensitivity on the left but none on the right.  In stance evaluation the shoulders and pelvis are level.  With forward flexion of the spine there is a left-sided paravertebral prominence.  Her lower extremity neurological examination revealed 2+ symmetrical reflexes.  Motor power is normal to manual testing all lower extremity groups and sensation is normal to light touch in all dermatomes.  Straight leg raising in the sitting position is to 90 degrees bilaterally causing mild lower back pain.  Her hips and her knees have a full and painless range of motion with normal stability.  Vascular examination shows no evidence of varicosities and there is no lymphedema.  There are no cutaneous abnormalities of the upper extremities or the lower extremities.  Her upper extremities are normal to inspection and her elbows have a full and painless range of motion with normal motor power normal stability. [de-identified] : AP and lateral x-rays of the lumbar spine reveal mild scoliosis.  Sagittal alignment is normal and there are no destructive changes.

## 2021-10-01 NOTE — DISCUSSION/SUMMARY
[Medication Risks Reviewed] : Medication risks reviewed [de-identified] : She will rest and use moist heat.  She has been started on ibuprofen 800 mg 3 times a day as a nonsteroidal anti-inflammatory.  She is on pantoprazole for her heartburn symptoms.  She will call if there are problems with the medication or worsening of her symptoms and I will see her for follow-up in 3 weeks.

## 2021-10-20 ENCOUNTER — RX RENEWAL (OUTPATIENT)
Age: 41
End: 2021-10-20

## 2021-10-21 ENCOUNTER — APPOINTMENT (OUTPATIENT)
Dept: ORTHOPEDIC SURGERY | Facility: CLINIC | Age: 41
End: 2021-10-21

## 2021-10-27 NOTE — ED ADULT TRIAGE NOTE - TEMPERATURE IN FAHRENHEIT (DEGREES F)
Situation:  RNCM f/u call.    Assessments:    CHF: Pt denied any new or worsening cough, congestion, or SOB. Pt said her legs are less swollen this week.  Dentist?: Pt said she is researching on getting dental implants.   Compression stockings?: Pt said she was able to get these compression stockings. She did get the light compression. Pt said she will be wearing.   Insomnia: Pt has been staying up until 3am, but doesn't feel sleepy then. Has been trying her zolpidem 10mg a night.   Big toenail fungus: Pt said she will be going to see a new podiatrist in [place] to see about fixing this.     Additional:                Actions Taken:  Coordination/Collaboration:  n/a  Patient Education Topics:  Action plan.    Plan: To continue outreach to pt as needed.    Next Outreach: Two weeks.      *See hyperlinks within encounter for full documentation.*     98.3

## 2021-11-22 ENCOUNTER — NON-APPOINTMENT (OUTPATIENT)
Age: 41
End: 2021-11-22

## 2021-11-22 DIAGNOSIS — N92.6 IRREGULAR MENSTRUATION, UNSPECIFIED: ICD-10-CM

## 2021-11-23 ENCOUNTER — APPOINTMENT (OUTPATIENT)
Dept: ULTRASOUND IMAGING | Facility: CLINIC | Age: 41
End: 2021-11-23
Payer: COMMERCIAL

## 2021-11-23 ENCOUNTER — EMERGENCY (EMERGENCY)
Facility: HOSPITAL | Age: 41
LOS: 1 days | Discharge: HOME | End: 2021-11-23
Attending: EMERGENCY MEDICINE | Admitting: EMERGENCY MEDICINE
Payer: COMMERCIAL

## 2021-11-23 VITALS
HEIGHT: 62 IN | HEART RATE: 81 BPM | DIASTOLIC BLOOD PRESSURE: 79 MMHG | OXYGEN SATURATION: 100 % | TEMPERATURE: 98 F | RESPIRATION RATE: 16 BRPM | SYSTOLIC BLOOD PRESSURE: 145 MMHG

## 2021-11-23 DIAGNOSIS — Z90.722 ACQUIRED ABSENCE OF OVARIES, BILATERAL: Chronic | ICD-10-CM

## 2021-11-23 DIAGNOSIS — Z90.49 ACQUIRED ABSENCE OF OTHER SPECIFIED PARTS OF DIGESTIVE TRACT: Chronic | ICD-10-CM

## 2021-11-23 DIAGNOSIS — Z98.890 OTHER SPECIFIED POSTPROCEDURAL STATES: Chronic | ICD-10-CM

## 2021-11-23 PROCEDURE — 99283 EMERGENCY DEPT VISIT LOW MDM: CPT

## 2021-11-23 PROCEDURE — 76856 US EXAM PELVIC COMPLETE: CPT | Mod: 59

## 2021-11-23 PROCEDURE — 76830 TRANSVAGINAL US NON-OB: CPT

## 2021-11-23 RX ORDER — ACETAMINOPHEN 500 MG
650 TABLET ORAL ONCE
Refills: 0 | Status: COMPLETED | OUTPATIENT
Start: 2021-11-23 | End: 2021-11-23

## 2021-11-23 RX ORDER — IBUPROFEN 200 MG
600 TABLET ORAL ONCE
Refills: 0 | Status: COMPLETED | OUTPATIENT
Start: 2021-11-23 | End: 2021-11-23

## 2021-11-23 RX ADMIN — Medication 650 MILLIGRAM(S): at 13:31

## 2021-11-23 RX ADMIN — Medication 600 MILLIGRAM(S): at 13:31
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
## 2021-11-23 NOTE — ED PROVIDER NOTE - PATIENT PORTAL LINK FT
You can access the FollowMyHealth Patient Portal offered by [facility] by registering at the following website: [email or web address]. By joining [employer]’s FollowMyHealth portal, you will also be able to view your health information using other applications (apps) compatible with our system.

## 2021-12-07 ENCOUNTER — APPOINTMENT (OUTPATIENT)
Dept: OBGYN | Facility: CLINIC | Age: 41
End: 2021-12-07
Payer: COMMERCIAL

## 2021-12-07 VITALS — DIASTOLIC BLOOD PRESSURE: 84 MMHG | SYSTOLIC BLOOD PRESSURE: 125 MMHG | WEIGHT: 160 LBS | BODY MASS INDEX: 29.26 KG/M2

## 2021-12-07 DIAGNOSIS — R07.89 OTHER CHEST PAIN: ICD-10-CM

## 2021-12-07 DIAGNOSIS — S80.01XA CONTUSION OF RIGHT KNEE, INITIAL ENCOUNTER: ICD-10-CM

## 2021-12-07 DIAGNOSIS — N94.6 DYSMENORRHEA, UNSPECIFIED: ICD-10-CM

## 2021-12-07 DIAGNOSIS — Z87.898 PERSONAL HISTORY OF OTHER SPECIFIED CONDITIONS: ICD-10-CM

## 2021-12-07 DIAGNOSIS — N80.9 ENDOMETRIOSIS, UNSPECIFIED: ICD-10-CM

## 2021-12-07 DIAGNOSIS — R73.09 OTHER ABNORMAL GLUCOSE: ICD-10-CM

## 2021-12-07 DIAGNOSIS — M25.561 PAIN IN RIGHT KNEE: ICD-10-CM

## 2021-12-07 DIAGNOSIS — N91.0 PRIMARY AMENORRHEA: ICD-10-CM

## 2021-12-07 DIAGNOSIS — M76.899 OTHER SPECIFIED ENTHESOPATHIES OF UNSPECIFIED LOWER LIMB, EXCLUDING FOOT: ICD-10-CM

## 2021-12-07 DIAGNOSIS — Z87.2 PERSONAL HISTORY OF DISEASES OF THE SKIN AND SUBCUTANEOUS TISSUE: ICD-10-CM

## 2021-12-07 DIAGNOSIS — Z01.419 ENCOUNTER FOR GYNECOLOGICAL EXAMINATION (GENERAL) (ROUTINE) W/OUT ABNORMAL FINDINGS: ICD-10-CM

## 2021-12-07 DIAGNOSIS — N76.4 ABSCESS OF VULVA: ICD-10-CM

## 2021-12-07 DIAGNOSIS — R39.9 UNSPECIFIED SYMPTOMS AND SIGNS INVOLVING THE GENITOURINARY SYSTEM: ICD-10-CM

## 2021-12-07 DIAGNOSIS — Z87.59 PERSONAL HISTORY OF OTHER COMPLICATIONS OF PREGNANCY, CHILDBIRTH AND THE PUERPERIUM: ICD-10-CM

## 2021-12-07 DIAGNOSIS — K13.79 OTHER LESIONS OF ORAL MUCOSA: ICD-10-CM

## 2021-12-07 DIAGNOSIS — Z98.891 HISTORY OF UTERINE SCAR FROM PREVIOUS SURGERY: ICD-10-CM

## 2021-12-07 DIAGNOSIS — N92.1 EXCESSIVE AND FREQUENT MENSTRUATION WITH IRREGULAR CYCLE: ICD-10-CM

## 2021-12-07 DIAGNOSIS — Z87.42 PERSONAL HISTORY OF OTHER DISEASES OF THE FEMALE GENITAL TRACT: ICD-10-CM

## 2021-12-07 DIAGNOSIS — Z23 ENCOUNTER FOR IMMUNIZATION: ICD-10-CM

## 2021-12-07 DIAGNOSIS — S80.00XA CONTUSION OF UNSPECIFIED KNEE, INITIAL ENCOUNTER: ICD-10-CM

## 2021-12-07 PROCEDURE — 99214 OFFICE O/P EST MOD 30 MIN: CPT | Mod: 25

## 2021-12-07 PROCEDURE — 58100 BIOPSY OF UTERUS LINING: CPT

## 2021-12-07 RX ORDER — MELOXICAM 15 MG/1
15 TABLET ORAL
Qty: 30 | Refills: 1 | Status: COMPLETED | COMMUNITY
Start: 2020-07-24 | End: 2021-12-07

## 2021-12-07 RX ORDER — SERTRALINE HYDROCHLORIDE 100 MG/1
100 TABLET, FILM COATED ORAL
Refills: 0 | Status: COMPLETED | COMMUNITY
Start: 2019-12-12 | End: 2021-12-07

## 2021-12-07 RX ORDER — ESCITALOPRAM OXALATE 10 MG/1
10 TABLET ORAL
Refills: 0 | Status: ACTIVE | COMMUNITY
Start: 2021-12-07

## 2021-12-07 RX ORDER — NORETHINDRONE AND ETHINYL ESTRADIOL 0.4-0.035
0.4-35 KIT ORAL
Qty: 3 | Refills: 3 | Status: ACTIVE | COMMUNITY
Start: 2021-12-07 | End: 1900-01-01

## 2021-12-07 RX ORDER — FREMANEZUMAB-VFRM 225 MG/1.5ML
INJECTION SUBCUTANEOUS
Refills: 0 | Status: COMPLETED | COMMUNITY
End: 2021-12-07

## 2021-12-07 NOTE — PHYSICAL EXAM

## 2021-12-07 NOTE — PROCEDURE
[Cervical Pap Smear] : cervical Pap smear [Liquid Base] : liquid base [Endometrial Biopsy] : Endometrial biopsy [Time out performed] : Pre-procedure time out performed.  Patient's name, date of birth and procedure confirmed. [Consent Obtained] : Consent obtained [Risks] : risks [Benefits] : benefits [Alternatives] : alternatives [Patient] : patient [Infection] : infection [Bleeding] : bleeding [Allergic Reaction] : allergic reaction [Uterine Perforation] : uterine perforation [Pain] : pain [Negative] : negative pregnancy test [No Premedication] : No premedication [None] : none [Tenaculum] : Tenaculum [Easy Passage] : Easy passage [Anteverted] : anteverted [Moderate] : moderate [Sent to Pathology] : placed in buffered formalin and sent for pathology [Tolerated Well] : Patient tolerated the procedure well [No Complications] : No complications

## 2021-12-07 NOTE — HISTORY OF PRESENT ILLNESS
[Patient reported PAP Smear was normal] : Patient reported PAP Smear was normal [Patient reported colonoscopy was normal] : Patient reported colonoscopy was normal [FreeTextEntry1] : 42YO P1 LMP 11/4 through today  10/2021, pt noted increased flow since she D/C'd OCP (LoloEstrin) about 5 months ago, secondary to mood swings. Pt had pelvic U/S with ?ovarian cyst with thrombus and EC 7mm. [PapSmeardate] : 2019 [ColonoscopyDate] : 2016

## 2021-12-10 LAB — HPV HIGH+LOW RISK DNA PNL CVX: NOT DETECTED

## 2021-12-13 LAB — CYTOLOGY CVX/VAG DOC THIN PREP: NORMAL

## 2021-12-14 LAB — CORE LAB BIOPSY: NORMAL

## 2022-01-10 ENCOUNTER — RX RENEWAL (OUTPATIENT)
Age: 42
End: 2022-01-10

## 2022-04-12 ENCOUNTER — EMERGENCY (EMERGENCY)
Facility: HOSPITAL | Age: 42
LOS: 1 days | Discharge: HOME | End: 2022-04-12
Admitting: EMERGENCY MEDICINE
Payer: COMMERCIAL

## 2022-04-12 VITALS
HEART RATE: 86 BPM | RESPIRATION RATE: 17 BRPM | HEIGHT: 62 IN | TEMPERATURE: 98 F | SYSTOLIC BLOOD PRESSURE: 124 MMHG | OXYGEN SATURATION: 100 % | DIASTOLIC BLOOD PRESSURE: 84 MMHG

## 2022-04-12 DIAGNOSIS — Z90.49 ACQUIRED ABSENCE OF OTHER SPECIFIED PARTS OF DIGESTIVE TRACT: Chronic | ICD-10-CM

## 2022-04-12 DIAGNOSIS — Z90.722 ACQUIRED ABSENCE OF OVARIES, BILATERAL: Chronic | ICD-10-CM

## 2022-04-12 DIAGNOSIS — Z98.890 OTHER SPECIFIED POSTPROCEDURAL STATES: Chronic | ICD-10-CM

## 2022-04-12 PROCEDURE — 99283 EMERGENCY DEPT VISIT LOW MDM: CPT

## 2022-04-12 RX ORDER — IBUPROFEN 200 MG
600 TABLET ORAL ONCE
Refills: 0 | Status: COMPLETED | OUTPATIENT
Start: 2022-04-12 | End: 2022-04-12

## 2022-04-12 RX ORDER — CEPHALEXIN 500 MG
500 CAPSULE ORAL ONCE
Refills: 0 | Status: COMPLETED | OUTPATIENT
Start: 2022-04-12 | End: 2022-04-12

## 2022-04-12 RX ORDER — CEPHALEXIN 500 MG
1 CAPSULE ORAL
Qty: 28 | Refills: 0
Start: 2022-04-12 | End: 2022-04-18

## 2022-04-12 RX ADMIN — Medication 600 MILLIGRAM(S): at 23:35

## 2022-04-12 RX ADMIN — Medication 500 MILLIGRAM(S): at 23:35

## 2022-04-12 NOTE — ED PROVIDER NOTE - CLINICAL SUMMARY MEDICAL DECISION MAKING FREE TEXT BOX
Patient is a 41 y.o female with PMHx of Lupus, recurrent abscesses  who presents to ED c/o painful lump to Rt axilla x 1 week.     DDx- abscess/cellulitis to Rt axilla. Area indurated. Area cleansed with antiseptic and using 25 gauge needle tried to access for purulence but only noted blood. Area mostly firm, and not ready for i&d. Plan for abx and warm compress and patient to return for wound check in 2 days. Patient is a 41 y.o female with PMHx of Lupus, recurrent abscesses  who presents to ED c/o painful lump to Rt axilla x 1 week.     DDx- abscess/cellulitis to Rt axilla. Area indurated. Area cleansed with antiseptic and using 25 gauge needle tried to access for purulence via needle aspiration but not pus noted. Area mostly firm, and not ready for i&d. Plan for abx and warm compress and patient to return for wound check in 2 days.

## 2022-04-12 NOTE — ED PROVIDER NOTE - PHYSICAL EXAMINATION
Vital signs reviewed.   CONSTITUTIONAL: Well-appearing; well-nourished; in no apparent distress. Non-toxic appearing.   HEAD: Normocephalic, atraumatic.  EYES: conjunctiva and sclera WNL.  EXT/MS: moves all extremities;  SKIN: +small abscess noted to Rt axilla that has overlying erythema- Areas feels mostly indurated and is tender to palpation. No lymphangitis.    NEURO: Awake, alert, oriented x 3, no gross deficits  PSYCH: Normal mood; appropriate affect.

## 2022-04-12 NOTE — ED ADULT TRIAGE NOTE - CHIEF COMPLAINT QUOTE
Pt c/o boil to right armpit x1 week.  Went to urgicenter and was sent here stating it was deep.  C?O nausea.  Hx lupus

## 2022-04-12 NOTE — ED PROVIDER NOTE - OBJECTIVE STATEMENT
HPI- Patient is a 41 y.o female with Rt  axilla pain HPI- Patient is a 41 y.o female with PMHx of Lupus, recurrent abscesses  who presents to ED c/o painful lump to Rt axilla x 1 week. States area more swollen, red and painful. Pt admits shes is prone to abscesses-recently had small abscess/pimple under Rt breast  that "popped" and drained on its own and now resolved. Also had surgery in past for back abscess. Pt denies fevers, chills, cp, sob, URI sx, OCP use, numbness or tingling, weakness or any other complaints.

## 2022-04-12 NOTE — ED PROVIDER NOTE - PATIENT PORTAL LINK FT
You can access the FollowMyHealth Patient Portal offered by [facility] by registering at the following website: [email or web address]. By joining [employer]’s FollowMyHealth portal, you will also be able to view your health information using other applications (apps) compatible with our system.

## 2022-04-12 NOTE — ED PROVIDER NOTE - PROGRESS NOTE DETAILS
PA Holman- Area examined- no fluctuance. Mostly indurated and TTP. Not ready to be i&d. Plan for dc on keflex and warm compresses and patient to return in 2 days for wound check. Derm referral given.

## 2022-04-28 ENCOUNTER — EMERGENCY (EMERGENCY)
Facility: HOSPITAL | Age: 42
LOS: 1 days | Discharge: HOME | End: 2022-04-28
Attending: EMERGENCY MEDICINE | Admitting: EMERGENCY MEDICINE
Payer: COMMERCIAL

## 2022-04-28 VITALS
TEMPERATURE: 99 F | HEART RATE: 107 BPM | SYSTOLIC BLOOD PRESSURE: 126 MMHG | DIASTOLIC BLOOD PRESSURE: 73 MMHG | HEIGHT: 62 IN | RESPIRATION RATE: 18 BRPM | OXYGEN SATURATION: 99 %

## 2022-04-28 VITALS
HEART RATE: 84 BPM | SYSTOLIC BLOOD PRESSURE: 124 MMHG | DIASTOLIC BLOOD PRESSURE: 87 MMHG | RESPIRATION RATE: 17 BRPM | OXYGEN SATURATION: 100 % | TEMPERATURE: 98 F

## 2022-04-28 DIAGNOSIS — Z90.722 ACQUIRED ABSENCE OF OVARIES, BILATERAL: Chronic | ICD-10-CM

## 2022-04-28 DIAGNOSIS — Z98.890 OTHER SPECIFIED POSTPROCEDURAL STATES: Chronic | ICD-10-CM

## 2022-04-28 DIAGNOSIS — Z90.49 ACQUIRED ABSENCE OF OTHER SPECIFIED PARTS OF DIGESTIVE TRACT: Chronic | ICD-10-CM

## 2022-04-28 LAB
ALBUMIN SERPL ELPH-MCNC: 4.2 G/DL — SIGNIFICANT CHANGE UP (ref 3.3–5)
ALP SERPL-CCNC: 111 U/L — SIGNIFICANT CHANGE UP (ref 40–120)
ALT FLD-CCNC: 18 U/L — SIGNIFICANT CHANGE UP (ref 4–33)
ANION GAP SERPL CALC-SCNC: 13 MMOL/L — SIGNIFICANT CHANGE UP (ref 7–14)
APPEARANCE UR: CLEAR — SIGNIFICANT CHANGE UP
AST SERPL-CCNC: 16 U/L — SIGNIFICANT CHANGE UP (ref 4–32)
B PERT DNA SPEC QL NAA+PROBE: SIGNIFICANT CHANGE UP
B PERT+PARAPERT DNA PNL SPEC NAA+PROBE: SIGNIFICANT CHANGE UP
BACTERIA # UR AUTO: NEGATIVE — SIGNIFICANT CHANGE UP
BASE EXCESS BLDV CALC-SCNC: -1.3 MMOL/L — SIGNIFICANT CHANGE UP (ref -2–3)
BASOPHILS # BLD AUTO: 0.03 K/UL — SIGNIFICANT CHANGE UP (ref 0–0.2)
BASOPHILS NFR BLD AUTO: 0.2 % — SIGNIFICANT CHANGE UP (ref 0–2)
BILIRUB SERPL-MCNC: <0.2 MG/DL — SIGNIFICANT CHANGE UP (ref 0.2–1.2)
BILIRUB UR-MCNC: NEGATIVE — SIGNIFICANT CHANGE UP
BLOOD GAS VENOUS COMPREHENSIVE RESULT: SIGNIFICANT CHANGE UP
BORDETELLA PARAPERTUSSIS (RAPRVP): SIGNIFICANT CHANGE UP
BUN SERPL-MCNC: 11 MG/DL — SIGNIFICANT CHANGE UP (ref 7–23)
C PNEUM DNA SPEC QL NAA+PROBE: SIGNIFICANT CHANGE UP
CALCIUM SERPL-MCNC: 8.8 MG/DL — SIGNIFICANT CHANGE UP (ref 8.4–10.5)
CHLORIDE BLDV-SCNC: 106 MMOL/L — SIGNIFICANT CHANGE UP (ref 96–108)
CHLORIDE SERPL-SCNC: 106 MMOL/L — SIGNIFICANT CHANGE UP (ref 98–107)
CO2 BLDV-SCNC: 25.6 MMOL/L — SIGNIFICANT CHANGE UP (ref 22–26)
CO2 SERPL-SCNC: 21 MMOL/L — LOW (ref 22–31)
COLOR SPEC: YELLOW — SIGNIFICANT CHANGE UP
CREAT SERPL-MCNC: 0.53 MG/DL — SIGNIFICANT CHANGE UP (ref 0.5–1.3)
DIFF PNL FLD: NEGATIVE — SIGNIFICANT CHANGE UP
EGFR: 120 ML/MIN/1.73M2 — SIGNIFICANT CHANGE UP
EOSINOPHIL # BLD AUTO: 0.09 K/UL — SIGNIFICANT CHANGE UP (ref 0–0.5)
EOSINOPHIL NFR BLD AUTO: 0.7 % — SIGNIFICANT CHANGE UP (ref 0–6)
EPI CELLS # UR: 1 /HPF — SIGNIFICANT CHANGE UP (ref 0–5)
FLUAV SUBTYP SPEC NAA+PROBE: SIGNIFICANT CHANGE UP
FLUBV RNA SPEC QL NAA+PROBE: SIGNIFICANT CHANGE UP
GAS PNL BLDV: 136 MMOL/L — SIGNIFICANT CHANGE UP (ref 136–145)
GAS PNL BLDV: SIGNIFICANT CHANGE UP
GLUCOSE BLDV-MCNC: 103 MG/DL — HIGH (ref 70–99)
GLUCOSE SERPL-MCNC: 105 MG/DL — HIGH (ref 70–99)
GLUCOSE UR QL: NEGATIVE — SIGNIFICANT CHANGE UP
HADV DNA SPEC QL NAA+PROBE: SIGNIFICANT CHANGE UP
HCO3 BLDV-SCNC: 24 MMOL/L — SIGNIFICANT CHANGE UP (ref 22–29)
HCOV 229E RNA SPEC QL NAA+PROBE: SIGNIFICANT CHANGE UP
HCOV HKU1 RNA SPEC QL NAA+PROBE: SIGNIFICANT CHANGE UP
HCOV NL63 RNA SPEC QL NAA+PROBE: SIGNIFICANT CHANGE UP
HCOV OC43 RNA SPEC QL NAA+PROBE: SIGNIFICANT CHANGE UP
HCT VFR BLD CALC: 38.3 % — SIGNIFICANT CHANGE UP (ref 34.5–45)
HCT VFR BLDA CALC: 38 % — SIGNIFICANT CHANGE UP (ref 34.5–46.5)
HGB BLD CALC-MCNC: 12.7 G/DL — SIGNIFICANT CHANGE UP (ref 11.5–15.5)
HGB BLD-MCNC: 12.4 G/DL — SIGNIFICANT CHANGE UP (ref 11.5–15.5)
HMPV RNA SPEC QL NAA+PROBE: SIGNIFICANT CHANGE UP
HPIV1 RNA SPEC QL NAA+PROBE: SIGNIFICANT CHANGE UP
HPIV2 RNA SPEC QL NAA+PROBE: SIGNIFICANT CHANGE UP
HPIV3 RNA SPEC QL NAA+PROBE: SIGNIFICANT CHANGE UP
HPIV4 RNA SPEC QL NAA+PROBE: SIGNIFICANT CHANGE UP
HYALINE CASTS # UR AUTO: 0 /LPF — SIGNIFICANT CHANGE UP (ref 0–7)
IANC: 8.47 K/UL — HIGH (ref 1.8–7.4)
IMM GRANULOCYTES NFR BLD AUTO: 0.4 % — SIGNIFICANT CHANGE UP (ref 0–1.5)
KETONES UR-MCNC: NEGATIVE — SIGNIFICANT CHANGE UP
LACTATE BLDV-MCNC: 1.3 MMOL/L — SIGNIFICANT CHANGE UP (ref 0.5–2)
LEUKOCYTE ESTERASE UR-ACNC: NEGATIVE — SIGNIFICANT CHANGE UP
LYMPHOCYTES # BLD AUTO: 28.1 % — SIGNIFICANT CHANGE UP (ref 13–44)
LYMPHOCYTES # BLD AUTO: 3.66 K/UL — HIGH (ref 1–3.3)
M PNEUMO DNA SPEC QL NAA+PROBE: SIGNIFICANT CHANGE UP
MCHC RBC-ENTMCNC: 24 PG — LOW (ref 27–34)
MCHC RBC-ENTMCNC: 32.4 GM/DL — SIGNIFICANT CHANGE UP (ref 32–36)
MCV RBC AUTO: 74.2 FL — LOW (ref 80–100)
MONOCYTES # BLD AUTO: 0.73 K/UL — SIGNIFICANT CHANGE UP (ref 0–0.9)
MONOCYTES NFR BLD AUTO: 5.6 % — SIGNIFICANT CHANGE UP (ref 2–14)
NEUTROPHILS # BLD AUTO: 8.47 K/UL — HIGH (ref 1.8–7.4)
NEUTROPHILS NFR BLD AUTO: 65 % — SIGNIFICANT CHANGE UP (ref 43–77)
NITRITE UR-MCNC: NEGATIVE — SIGNIFICANT CHANGE UP
NRBC # BLD: 0 /100 WBCS — SIGNIFICANT CHANGE UP
NRBC # FLD: 0 K/UL — SIGNIFICANT CHANGE UP
PCO2 BLDV: 43 MMHG — HIGH (ref 39–42)
PH BLDV: 7.36 — SIGNIFICANT CHANGE UP (ref 7.32–7.43)
PH UR: 5.5 — SIGNIFICANT CHANGE UP (ref 5–8)
PLATELET # BLD AUTO: 325 K/UL — SIGNIFICANT CHANGE UP (ref 150–400)
PO2 BLDV: 47 MMHG — SIGNIFICANT CHANGE UP
POTASSIUM BLDV-SCNC: 4.2 MMOL/L — SIGNIFICANT CHANGE UP (ref 3.5–5.1)
POTASSIUM SERPL-MCNC: 4.4 MMOL/L — SIGNIFICANT CHANGE UP (ref 3.5–5.3)
POTASSIUM SERPL-SCNC: 4.4 MMOL/L — SIGNIFICANT CHANGE UP (ref 3.5–5.3)
PROT SERPL-MCNC: 7.5 G/DL — SIGNIFICANT CHANGE UP (ref 6–8.3)
PROT UR-MCNC: ABNORMAL
RAPID RVP RESULT: SIGNIFICANT CHANGE UP
RBC # BLD: 5.16 M/UL — SIGNIFICANT CHANGE UP (ref 3.8–5.2)
RBC # FLD: 14.6 % — HIGH (ref 10.3–14.5)
RBC CASTS # UR COMP ASSIST: 4 /HPF — SIGNIFICANT CHANGE UP (ref 0–4)
RSV RNA SPEC QL NAA+PROBE: SIGNIFICANT CHANGE UP
RV+EV RNA SPEC QL NAA+PROBE: SIGNIFICANT CHANGE UP
SAO2 % BLDV: 80.3 % — SIGNIFICANT CHANGE UP
SARS-COV-2 RNA SPEC QL NAA+PROBE: SIGNIFICANT CHANGE UP
SODIUM SERPL-SCNC: 140 MMOL/L — SIGNIFICANT CHANGE UP (ref 135–145)
SP GR SPEC: 1.02 — SIGNIFICANT CHANGE UP (ref 1–1.05)
UROBILINOGEN FLD QL: SIGNIFICANT CHANGE UP
WBC # BLD: 13.03 K/UL — HIGH (ref 3.8–10.5)
WBC # FLD AUTO: 13.03 K/UL — HIGH (ref 3.8–10.5)
WBC UR QL: 4 /HPF — SIGNIFICANT CHANGE UP (ref 0–5)

## 2022-04-28 PROCEDURE — 71046 X-RAY EXAM CHEST 2 VIEWS: CPT | Mod: 26

## 2022-04-28 PROCEDURE — 99285 EMERGENCY DEPT VISIT HI MDM: CPT

## 2022-04-28 RX ORDER — IPRATROPIUM/ALBUTEROL SULFATE 18-103MCG
3 AEROSOL WITH ADAPTER (GRAM) INHALATION EVERY 6 HOURS
Refills: 0 | Status: DISCONTINUED | OUTPATIENT
Start: 2022-04-28 | End: 2022-04-28

## 2022-04-28 RX ORDER — ACETAMINOPHEN 500 MG
1000 TABLET ORAL ONCE
Refills: 0 | Status: COMPLETED | OUTPATIENT
Start: 2022-04-28 | End: 2022-04-28

## 2022-04-28 RX ORDER — SODIUM CHLORIDE 9 MG/ML
1000 INJECTION INTRAMUSCULAR; INTRAVENOUS; SUBCUTANEOUS ONCE
Refills: 0 | Status: COMPLETED | OUTPATIENT
Start: 2022-04-28 | End: 2022-04-28

## 2022-04-28 RX ADMIN — Medication 400 MILLIGRAM(S): at 16:28

## 2022-04-28 RX ADMIN — Medication 100 MILLIGRAM(S): at 16:29

## 2022-04-28 RX ADMIN — SODIUM CHLORIDE 1000 MILLILITER(S): 9 INJECTION INTRAMUSCULAR; INTRAVENOUS; SUBCUTANEOUS at 16:29

## 2022-04-28 NOTE — ED PROVIDER NOTE - OBJECTIVE STATEMENT
42 y/o female with PMHx of SLE, Hypothyroidism, and Endometrioses who presented to the ED for fever, cough and body aches X 10 days. Pt states over the 10 days having a non-productive cough with tactile fevers and body aches. Pt was seen at [facility] and started on Zithromax for suspected Bronchitis. Pt denies any nausea, vomiting, chest pain, or abd pain. Pt denies any dysuria or diarrhea.

## 2022-04-28 NOTE — ED PROVIDER NOTE - CLINICAL SUMMARY MEDICAL DECISION MAKING FREE TEXT BOX
42 y/o female with PMHx of SLE, Hypothyroidism, and Endometrioses who presented to the ED for fever, cough, and body aches X 10 days.  Concern for URI/PNA  Labs, CXR, Analgesia

## 2022-04-28 NOTE — ED ADULT TRIAGE NOTE - STATUS:
Subjective:       Patient ID: Katina Cohen is a 35 y.o. female.    Chief Complaint: Pelvic Pain    Pt here for follow up anxiety and pelvic pain.  She saw dr finnegan who also recommended pelvic floor therapy. She has been going twice a week for 2 weeks and has not noticed improvement yet but therapist thinks itll take 8-10 weeks. She is on gabapentin, propranolol, and nighttime xanax. She feels this combination is helping.she says she had a good month last month but did have a bad night last night however she had electrical stimulation yesterday    Review of Systems   Constitutional: Negative for fatigue, fever and unexpected weight change.   HENT: Negative for nasal congestion, postnasal drip and sore throat.    Eyes: Negative for visual disturbance.   Respiratory: Negative for cough, chest tightness, shortness of breath and wheezing.    Cardiovascular: Negative for chest pain, palpitations and leg swelling.   Gastrointestinal: Negative for abdominal pain, blood in stool, constipation, diarrhea, nausea and vomiting.   Endocrine: Negative for cold intolerance and heat intolerance.   Genitourinary: Negative for difficulty urinating, dysuria and frequency.   Musculoskeletal: Negative for back pain, joint swelling and myalgias.   Integumentary:  Negative for rash.   Allergic/Immunologic: Negative for environmental allergies.   Neurological: Negative for dizziness, seizures, numbness and headaches.   Hematological: Does not bruise/bleed easily.   Psychiatric/Behavioral: Negative for agitation and sleep disturbance.         Objective:      Physical Exam  Constitutional:       Appearance: She is well-developed.   HENT:      Head: Normocephalic and atraumatic.   Eyes:      Conjunctiva/sclera: Conjunctivae normal.      Pupils: Pupils are equal, round, and reactive to light.   Neck:      Musculoskeletal: Normal range of motion and neck supple.      Thyroid: No thyromegaly.   Cardiovascular:      Rate and Rhythm:  Normal rate and regular rhythm.      Heart sounds: Normal heart sounds. No murmur. No friction rub. No gallop.    Pulmonary:      Effort: Pulmonary effort is normal. No respiratory distress.      Breath sounds: Normal breath sounds. No wheezing or rales.   Abdominal:      General: Bowel sounds are normal. There is no distension.      Palpations: Abdomen is soft.      Tenderness: There is no abdominal tenderness.   Musculoskeletal: Normal range of motion.   Lymphadenopathy:      Cervical: No cervical adenopathy.   Skin:     General: Skin is warm and dry.   Neurological:      Mental Status: She is alert and oriented to person, place, and time.      Cranial Nerves: No cranial nerve deficit.   Psychiatric:         Behavior: Behavior normal.         Assessment:       1. Pelvic pain        Plan:       Pelvic pain- continue therapy. Continue current meds and will follow up with me post therapy      [employer]

## 2022-04-28 NOTE — ED PROVIDER NOTE - PATIENT PORTAL LINK FT
You can access the FollowMyHealth Patient Portal offered by [facility] by registering at the following website: [email or web address]. By joining [employer]’s FollowMyHealth portal, you will also be able to view your health information using other applications (apps) compatible with our system.

## 2022-04-28 NOTE — ED PROVIDER NOTE - PROGRESS NOTE DETAILS
Brendan Will PGY-2 patient well appearing, stable for discharge, vitals reviewed,  given strict return precautions for worsening cough, fever, sob, to come back to ed. pt understands plan and will follow up with PMD within 1 week.

## 2022-04-28 NOTE — ED ADULT NURSE NOTE - OBJECTIVE STATEMENT
Pt A&Ox4, hx of lupus, thyroidectomy, C/O cough, chills, feverx 2weeks, denies chest pain, non productive cough, breathing non labored, Abd soft non tender, ambulatory independently at baseline, right 20G IV AC placed, lab sent, NSR on tele, afebrile, Med as per MD order given, call bell within reach.

## 2022-04-28 NOTE — ED ADULT TRIAGE NOTE - CHIEF COMPLAINT QUOTE
Patient has had a cough, chills and fever for the past 2 weeks. Went to Urgent Care and was sent here to evaluated for pneumonia. Pt has Lupus as well.

## 2022-04-28 NOTE — ED PROVIDER NOTE - ATTENDING CONTRIBUTION TO CARE
Pt was seen and evaluated by me. Pt is a 42 y/o female with PMHx of SLE, Hypothyroidism, and Endometrioses who presented to the ED for fever, cough and body aches X 10 days. Pt states over the 10 days having a non-productive cough with tactile fevers and body aches. Pt was seen at [facility] and started on Zithromax for suspected Bronchitis. Pt denies any nausea, vomiting, chest pain, or abd pain. Pt denies any dysuria or diarrhea.  VITALS: Vitals have been reviewed.  GEN APPEARANCE: WDWN, alert and cooperative, non-toxic appearing and in NAD  HEAD: Atraumatic, normocephalic.   EYES: PERRL, EOMI.   EARS: Gross hearing intact.   NOSE: No nasal discharge.   THROAT: MMM. Oral cavity and pharynx normal.   NECK: Supple  CV: RRR, S1S2, no c/r/m/g. No cyanosis or pallor. Extremities warm, well perfused. Cap refill <2 seconds.   LUNGS: CTAB. No wheezing. No rales. No rhonchi. No diminished breath sounds.   ABDOMEN: Soft, NTND. No guarding or rebound.   MSK/EXT: Spine appears normal, no spine point tenderness. No CVA ttp. Normal muscular development. No LE swelling or calf tenderness  NEURO: Alert, follows commands. Speech normal. Sensation and motor normal x4 extremities.   SKIN: Normal color for race, warm, dry and intact. No evidence of rash.  PSYCH: Normal mood and affect.  Concern for URI/PNA  Labs, CXR, Analgesia

## 2022-04-28 NOTE — ED PROVIDER NOTE - NSFOLLOWUPINSTRUCTIONS_ED_ALL_ED_FT
Viral Respiratory Infection      A respiratory infection is an illness that affects part of the respiratory system, such as the lungs, nose, or throat. A respiratory infection that is caused by a virus is called a viral respiratory infection.    Common types of viral respiratory infections include:  •A cold.      •The flu (influenza).      •A respiratory syncytial virus (RSV) infection.        What are the causes?    This condition is caused by a virus.      What are the signs or symptoms?    Symptoms of this condition include:  •A stuffy or runny nose.      •Yellow or green nasal discharge.      •A cough.      •Sneezing.      •Fatigue.      •Achy muscles.      •A sore throat.      •Sweating or chills.      •A fever.      •A headache.        How is this diagnosed?    This condition may be diagnosed based on:  •Your symptoms.      •A physical exam.      •Testing of nasal swabs.        How is this treated?    This condition may be treated with medicines, such as:  •Antiviral medicine. This may shorten the length of time a person has symptoms.      •Expectorants. These make it easier to cough up mucus.      •Decongestant nasal sprays.      •Acetaminophen or NSAIDs to relieve fever and pain.      Antibiotic medicines are not prescribed for viral infections. This is because antibiotics are designed to kill bacteria. They are not effective against viruses.      Follow these instructions at home:     Managing pain and congestion     •Take over-the-counter and prescription medicines only as told by your health care provider.      •If you have a sore throat, gargle with a salt-water mixture 3–4 times a day or as needed. To make a salt-water mixture, completely dissolve ½–1 tsp of salt in 1 cup of warm water.      •Use nose drops made from salt water to ease congestion and soften raw skin around your nose.      •Drink enough fluid to keep your urine pale yellow. This helps prevent dehydration and helps loosen up mucus.      General instructions     •Rest as much as possible.      • Do not drink alcohol.      • Do not use any products that contain nicotine or tobacco, such as cigarettes and e-cigarettes. If you need help quitting, ask your health care provider.      •Keep all follow-up visits as told by your health care provider. This is important.        How is this prevented?     •Get an annual flu shot. You may get the flu shot in late summer, fall, or winter. Ask your health care provider when you should get your flu shot.    •Avoid exposing others to your respiratory infection.  •Stay home from work or school as told by your health care provider.      •Wash your hands with soap and water often, especially after you cough or sneeze. If soap and water are not available, use alcohol-based hand .        •Avoid contact with people who are sick during cold and flu season. This is generally fall and winter.        Contact a health care provider if:    •Your symptoms last for 10 days or longer.      •Your symptoms get worse over time.      •You have a fever.      •You have severe sinus pain in your face or forehead.      •The glands in your jaw or neck become very swollen.        Get help right away if you:    •Feel pain or pressure in your chest.      •Have shortness of breath.      •Faint or feel like you will faint.      •Have severe and persistent vomiting.      •Feel confused or disoriented.        Summary    •A respiratory infection is an illness that affects part of the respiratory system, such as the lungs, nose, or throat. A respiratory infection that is caused by a virus is called a viral respiratory infection.      •Common types of viral respiratory infections are a cold, influenza, and respiratory syncytial virus (RSV) infection.      •Symptoms of this condition include a stuffy or runny nose, cough, sneezing, fatigue, achy muscles, sore throat, and fevers or chills.      •Antibiotic medicines are not prescribed for viral infections. This is because antibiotics are designed to kill bacteria. They are not effective against viruses.      This information is not intended to replace advice given to you by your health care provider. Make sure you discuss any questions you have with your health care provider.

## 2022-05-09 ENCOUNTER — EMERGENCY (EMERGENCY)
Facility: HOSPITAL | Age: 42
LOS: 1 days | Discharge: HOME | End: 2022-05-09
Attending: STUDENT IN AN ORGANIZED HEALTH CARE EDUCATION/TRAINING PROGRAM | Admitting: STUDENT IN AN ORGANIZED HEALTH CARE EDUCATION/TRAINING PROGRAM
Payer: COMMERCIAL

## 2022-05-09 VITALS
TEMPERATURE: 98 F | SYSTOLIC BLOOD PRESSURE: 131 MMHG | RESPIRATION RATE: 17 BRPM | HEART RATE: 89 BPM | OXYGEN SATURATION: 100 % | DIASTOLIC BLOOD PRESSURE: 70 MMHG

## 2022-05-09 VITALS
HEIGHT: 62 IN | OXYGEN SATURATION: 100 % | TEMPERATURE: 98 F | RESPIRATION RATE: 18 BRPM | SYSTOLIC BLOOD PRESSURE: 143 MMHG | HEART RATE: 91 BPM | DIASTOLIC BLOOD PRESSURE: 76 MMHG

## 2022-05-09 DIAGNOSIS — Z98.890 OTHER SPECIFIED POSTPROCEDURAL STATES: Chronic | ICD-10-CM

## 2022-05-09 DIAGNOSIS — Z90.49 ACQUIRED ABSENCE OF OTHER SPECIFIED PARTS OF DIGESTIVE TRACT: Chronic | ICD-10-CM

## 2022-05-09 DIAGNOSIS — Z90.722 ACQUIRED ABSENCE OF OVARIES, BILATERAL: Chronic | ICD-10-CM

## 2022-05-09 PROCEDURE — 71046 X-RAY EXAM CHEST 2 VIEWS: CPT | Mod: 26

## 2022-05-09 PROCEDURE — 99284 EMERGENCY DEPT VISIT MOD MDM: CPT

## 2022-05-09 RX ORDER — IBUPROFEN 200 MG
400 TABLET ORAL ONCE
Refills: 0 | Status: COMPLETED | OUTPATIENT
Start: 2022-05-09 | End: 2022-05-09

## 2022-05-09 RX ORDER — ACETAMINOPHEN 500 MG
650 TABLET ORAL ONCE
Refills: 0 | Status: COMPLETED | OUTPATIENT
Start: 2022-05-09 | End: 2022-05-09

## 2022-05-09 RX ADMIN — Medication 650 MILLIGRAM(S): at 22:06

## 2022-05-09 RX ADMIN — Medication 400 MILLIGRAM(S): at 22:12

## 2022-05-09 NOTE — ED ADULT NURSE NOTE - OBJECTIVE STATEMENT
Patient received A&O x 4 and amb with  at bedside. p/w back and dyspnea on exertion x 1 month. verbalized being treated for bronchitis. Denies sob, CP, dizziness, N/V/D. Stretcher in lowest position, wheels locked, appropriate side rails in place, call bell in reach.

## 2022-05-09 NOTE — ED PROVIDER NOTE - OBJECTIVE STATEMENT
42 yo Female PMHx Lupus, on injectable ITNF presents to the ED with complaints of a cough that started a month ago. Patient is currently  Covid negative and is in no respiratory distress. Patient denies and SOB, chest pain, body aches, fevers, chills, nausea, vomiting and diarrhea.

## 2022-05-09 NOTE — ED PROVIDER NOTE - CLINICAL SUMMARY MEDICAL DECISION MAKING FREE TEXT BOX
42 y/o Female with PMHx of Lupus, sacculitis  presenting cough x1 month. Will send rvp in immunology and will order Chest x-ray to rule out PNA.

## 2022-05-09 NOTE — ED PROVIDER NOTE - NS ED ROS FT
CONSTITUTIONAL: No fever,  Pulmonary: no cough  CARDIOVASCULAR: No chest pain,  GI: No abdominal pain  MUSKULOSKELETAL: No new pain in joints/muscles  ALL OTHER SYSTEMS NEGATIVE.

## 2022-05-09 NOTE — ED PROVIDER NOTE - PHYSICAL EXAMINATION
CONSTITUTIONAL: Non-toxic, non-diaphoretic, in no apparent distress  HEAD: Normocephalic; atruamatic  EYES: EOM intact   ENMT: External appears normal; normal oropharynx, moist  NECK: grossly normal active ROM,  CARD: No cyanosis, good peripheral perfusion,  RESP: Normal chest excursion with respiration; no increased work of breathing  ABD: non-distended   EXT: moving all extremities, no gross disfigurement or asymmetry,  SKIN: Warm, dry, no rash  NEURO:  moving all extremities, no facial droop, no dysarthria      cn2-12 intact  5/5 all extremities
Home

## 2022-05-09 NOTE — ED ADULT NURSE NOTE - NS PRO AD NO ADVANCE DIRECTIVE
OPERATION DATE:  12/13/2018    PREOPERATIVE DIAGNOSIS:    Left carpal tunnel.    POSTOPERATIVE DIAGNOSIS:    Left carpal tunnel.Left carpal tunnel.    SURGEON:  Harman Cantu MD    PROCEDURE:  Left carpal tunnel release.    ANESTHESIA:  General.    COMPLICATIONS:  None.    BLOOD LOSS:  Nil.    INDICATIONS:  This patient is a 55-year-old female, who has failed conservative management  for left carpal tunnel syndrome and wishes to pursue surgical treatment.  Risks, benefits, and alternatives were discussed.  Informed consent was  signed.     PROCEDURE IN DETAIL:  The patient was brought to the operating room, was given a general  anesthetic.  A cuff was placed on the left brachium.  Left hand and arm  prepped and draped in normal sterile fashion.  Arm was exsanguinated.  The  cuff was inflated to 250 mmHg.  A standard mini open palmar incision between  the thenar and the hypothenar eminence was made through the skin and  subcutaneous tissue through the palmar aponeurosis.  The transverse carpal  ligament was identified and was incised centrally for complete release.  A  thorough lavage was done.  The skin was closed with interrupted 4-0 nylon and  sterile dressing applied.           WV/Vivian  Dictation Date:  12/13/2018  Transcribed Date:  12/13/2018 19:33:05  Job #:  [id number]   No

## 2022-05-09 NOTE — ED ADULT TRIAGE NOTE - CHIEF COMPLAINT QUOTE
presents C/O cough back pain and DICKINSON x1 month. was being treated for bronchitis. No complaints of chest pain, headache, dizziness, vomiting   fever, verbalized.. PMhx Lupus.

## 2022-05-09 NOTE — ED PROVIDER NOTE - PROGRESS NOTE DETAILS
cxr clear, notified patient will be contacted if rvp positive, pulm follow up. Return precautions reviewed. Patient verbalized understand of conditions for return and plan for follow up.

## 2022-05-09 NOTE — ED PROVIDER NOTE - PATIENT PORTAL LINK FT
No You can access the FollowMyHealth Patient Portal offered by [facility] by registering at the following website: [email or web address]. By joining [employer]’s FollowMyHealth portal, you will also be able to view your health information using other applications (apps) compatible with our system.

## 2022-05-10 RX ORDER — HYDROCODONE BITARTRATE AND HOMATROPINE METHYLBROMIDE 5; 1.5 MG/5ML; MG/5ML
5 SOLUTION ORAL
Qty: 60 | Refills: 0
Start: 2022-05-10

## 2022-06-13 ENCOUNTER — EMERGENCY (EMERGENCY)
Facility: HOSPITAL | Age: 42
LOS: 1 days | Discharge: HOME | End: 2022-06-13
Attending: EMERGENCY MEDICINE | Admitting: EMERGENCY MEDICINE
Payer: COMMERCIAL

## 2022-06-13 VITALS
RESPIRATION RATE: 16 BRPM | DIASTOLIC BLOOD PRESSURE: 75 MMHG | TEMPERATURE: 98 F | HEIGHT: 62 IN | HEART RATE: 97 BPM | SYSTOLIC BLOOD PRESSURE: 109 MMHG | OXYGEN SATURATION: 100 %

## 2022-06-13 DIAGNOSIS — Z90.722 ACQUIRED ABSENCE OF OVARIES, BILATERAL: Chronic | ICD-10-CM

## 2022-06-13 DIAGNOSIS — Z90.49 ACQUIRED ABSENCE OF OTHER SPECIFIED PARTS OF DIGESTIVE TRACT: Chronic | ICD-10-CM

## 2022-06-13 DIAGNOSIS — Z98.890 OTHER SPECIFIED POSTPROCEDURAL STATES: Chronic | ICD-10-CM

## 2022-06-13 PROCEDURE — 99283 EMERGENCY DEPT VISIT LOW MDM: CPT

## 2022-06-13 RX ORDER — CEPHALEXIN 500 MG
1 CAPSULE ORAL
Qty: 28 | Refills: 0
Start: 2022-06-13 | End: 2022-06-19

## 2022-06-13 RX ORDER — OXYCODONE AND ACETAMINOPHEN 5; 325 MG/1; MG/1
1 TABLET ORAL
Qty: 5 | Refills: 0
Start: 2022-06-13 | End: 2022-06-14

## 2022-06-13 RX ORDER — LIDOCAINE HYDROCHLORIDE AND EPINEPHRINE 10; 10 MG/ML; UG/ML
10 INJECTION, SOLUTION INFILTRATION; PERINEURAL ONCE
Refills: 0 | Status: COMPLETED | OUTPATIENT
Start: 2022-06-13 | End: 2022-06-13

## 2022-06-13 RX ORDER — OXYCODONE AND ACETAMINOPHEN 5; 325 MG/1; MG/1
1 TABLET ORAL ONCE
Refills: 0 | Status: DISCONTINUED | OUTPATIENT
Start: 2022-06-13 | End: 2022-06-13

## 2022-06-13 RX ADMIN — OXYCODONE AND ACETAMINOPHEN 1 TABLET(S): 5; 325 TABLET ORAL at 19:04

## 2022-06-13 RX ADMIN — LIDOCAINE HYDROCHLORIDE AND EPINEPHRINE 10 MILLILITER(S): 10; 10 INJECTION, SOLUTION INFILTRATION; PERINEURAL at 19:37

## 2022-06-13 NOTE — ED PROVIDER NOTE - SKIN, MLM
Induration measuring 4x2cm at right axilla, No surrounding erythema, Very tender to palpation, No palpable fluctuation noted, distal exam normal.

## 2022-06-13 NOTE — ED ADULT NURSE NOTE - OBJECTIVE STATEMENT
PT noted with abscess to the right armpit, having difficulty putting arm down due to pain. taking Advil / Tylenol with no relief.  No distress noted.  Denies CP, no SOB noted.  Percocet given as ordered.  Will continue to monitor.

## 2022-06-13 NOTE — ED PROVIDER NOTE - PATIENT PORTAL LINK FT
You can access the FollowMyHealth Patient Portal offered by [facility] by registering at the following website: [email or web address]. By joining [employer]’s FollowMyHealth portal, you will also be able to view your health information using other applications (apps) compatible with our system.

## 2022-06-13 NOTE — ED PROVIDER NOTE - CLINICAL SUMMARY MEDICAL DECISION MAKING FREE TEXT BOX
Pt is a 42 y/o Female w/ PMH significant for Lupus coming complaining of right axillary tenderness for 3 days. Concerning for Right axillary abscess vs. Cellulitis. POCUS showing 3 cm abscess. I&D performed at beside. Will provide pain management, antibiotics. Most likely d/c, Outpatient follow-up recommended.

## 2022-06-13 NOTE — ED ADULT TRIAGE NOTE - CHIEF COMPLAINT QUOTE
pt with abscess to the right armpit, having difficulty putting arm down due to pain. taking Advil / Tylenol with no relief. has no other complaints. pt in no distress. hx. Lupus

## 2022-06-13 NOTE — ED PROVIDER NOTE - OBJECTIVE STATEMENT
Pt is a 40 y/o Female w/ PMH significant for Lupus coming complaining of right axillary tenderness for 3 days. Pt states pain upon palpation, but denies fever, chills, nausea, and vomiting. Admits to one similar episodes that has been treated conservatively w/ antibiotics.

## 2022-06-13 NOTE — ED PROVIDER NOTE - NSFOLLOWUPINSTRUCTIONS_ED_ALL_ED_FT
Keflex 500mg four times a day for one week.  Tylenol or Motrin for pain.  If pain does not improve, then Percocet one tablet every 6 hours only if needed, DO NOT DRIVE.  If abscess reoccurs, consider general surgeon for further management.

## 2022-06-14 PROCEDURE — 76882 US LMTD JT/FCL EVL NVASC XTR: CPT | Mod: 26,RT

## 2022-06-28 ENCOUNTER — TRANSCRIPTION ENCOUNTER (OUTPATIENT)
Age: 42
End: 2022-06-28

## 2022-07-05 ENCOUNTER — APPOINTMENT (OUTPATIENT)
Dept: PULMONOLOGY | Facility: CLINIC | Age: 42
End: 2022-07-05

## 2022-08-29 ENCOUNTER — EMERGENCY (EMERGENCY)
Facility: HOSPITAL | Age: 42
LOS: 1 days | Discharge: HOME | End: 2022-08-29
Attending: EMERGENCY MEDICINE | Admitting: EMERGENCY MEDICINE

## 2022-08-29 VITALS
SYSTOLIC BLOOD PRESSURE: 136 MMHG | DIASTOLIC BLOOD PRESSURE: 80 MMHG | TEMPERATURE: 98 F | HEIGHT: 62 IN | HEART RATE: 105 BPM | OXYGEN SATURATION: 100 % | RESPIRATION RATE: 18 BRPM

## 2022-08-29 DIAGNOSIS — Z90.49 ACQUIRED ABSENCE OF OTHER SPECIFIED PARTS OF DIGESTIVE TRACT: Chronic | ICD-10-CM

## 2022-08-29 DIAGNOSIS — Z98.890 OTHER SPECIFIED POSTPROCEDURAL STATES: Chronic | ICD-10-CM

## 2022-08-29 DIAGNOSIS — Z90.722 ACQUIRED ABSENCE OF OVARIES, BILATERAL: Chronic | ICD-10-CM

## 2022-08-29 LAB
ALBUMIN SERPL ELPH-MCNC: 4 G/DL — SIGNIFICANT CHANGE UP (ref 3.3–5)
ALP SERPL-CCNC: 108 U/L — SIGNIFICANT CHANGE UP (ref 40–120)
ALT FLD-CCNC: 15 U/L — SIGNIFICANT CHANGE UP (ref 4–33)
ANION GAP SERPL CALC-SCNC: 12 MMOL/L — SIGNIFICANT CHANGE UP (ref 7–14)
AST SERPL-CCNC: 14 U/L — SIGNIFICANT CHANGE UP (ref 4–32)
BASE EXCESS BLDV CALC-SCNC: 1.3 MMOL/L — SIGNIFICANT CHANGE UP (ref -2–3)
BASOPHILS # BLD AUTO: 0.05 K/UL — SIGNIFICANT CHANGE UP (ref 0–0.2)
BASOPHILS NFR BLD AUTO: 0.7 % — SIGNIFICANT CHANGE UP (ref 0–2)
BILIRUB SERPL-MCNC: <0.2 MG/DL — SIGNIFICANT CHANGE UP (ref 0.2–1.2)
BLOOD GAS VENOUS COMPREHENSIVE RESULT: SIGNIFICANT CHANGE UP
BUN SERPL-MCNC: 6 MG/DL — LOW (ref 7–23)
CALCIUM SERPL-MCNC: 8.8 MG/DL — SIGNIFICANT CHANGE UP (ref 8.4–10.5)
CHLORIDE BLDV-SCNC: 104 MMOL/L — SIGNIFICANT CHANGE UP (ref 96–108)
CHLORIDE SERPL-SCNC: 104 MMOL/L — SIGNIFICANT CHANGE UP (ref 98–107)
CO2 BLDV-SCNC: 28 MMOL/L — HIGH (ref 22–26)
CO2 SERPL-SCNC: 22 MMOL/L — SIGNIFICANT CHANGE UP (ref 22–31)
CREAT SERPL-MCNC: 0.66 MG/DL — SIGNIFICANT CHANGE UP (ref 0.5–1.3)
EGFR: 114 ML/MIN/1.73M2 — SIGNIFICANT CHANGE UP
EOSINOPHIL # BLD AUTO: 0.04 K/UL — SIGNIFICANT CHANGE UP (ref 0–0.5)
EOSINOPHIL NFR BLD AUTO: 0.6 % — SIGNIFICANT CHANGE UP (ref 0–6)
GAS PNL BLDV: 135 MMOL/L — LOW (ref 136–145)
GLUCOSE BLDV-MCNC: 90 MG/DL — SIGNIFICANT CHANGE UP (ref 70–99)
GLUCOSE SERPL-MCNC: 94 MG/DL — SIGNIFICANT CHANGE UP (ref 70–99)
HCO3 BLDV-SCNC: 27 MMOL/L — SIGNIFICANT CHANGE UP (ref 22–29)
HCT VFR BLD CALC: 39.1 % — SIGNIFICANT CHANGE UP (ref 34.5–45)
HCT VFR BLDA CALC: 38 % — SIGNIFICANT CHANGE UP (ref 34.5–46.5)
HGB BLD CALC-MCNC: 12.8 G/DL — SIGNIFICANT CHANGE UP (ref 11.5–15.5)
HGB BLD-MCNC: 12.8 G/DL — SIGNIFICANT CHANGE UP (ref 11.5–15.5)
IANC: 5.23 K/UL — SIGNIFICANT CHANGE UP (ref 1.8–7.4)
IMM GRANULOCYTES NFR BLD AUTO: 0.6 % — SIGNIFICANT CHANGE UP (ref 0–1.5)
LACTATE BLDV-MCNC: 1.8 MMOL/L — SIGNIFICANT CHANGE UP (ref 0.5–2)
LYMPHOCYTES # BLD AUTO: 0.95 K/UL — LOW (ref 1–3.3)
LYMPHOCYTES # BLD AUTO: 13.5 % — SIGNIFICANT CHANGE UP (ref 13–44)
MCHC RBC-ENTMCNC: 23.5 PG — LOW (ref 27–34)
MCHC RBC-ENTMCNC: 32.7 GM/DL — SIGNIFICANT CHANGE UP (ref 32–36)
MCV RBC AUTO: 71.7 FL — LOW (ref 80–100)
MONOCYTES # BLD AUTO: 0.74 K/UL — SIGNIFICANT CHANGE UP (ref 0–0.9)
MONOCYTES NFR BLD AUTO: 10.5 % — SIGNIFICANT CHANGE UP (ref 2–14)
NEUTROPHILS # BLD AUTO: 5.23 K/UL — SIGNIFICANT CHANGE UP (ref 1.8–7.4)
NEUTROPHILS NFR BLD AUTO: 74.1 % — SIGNIFICANT CHANGE UP (ref 43–77)
NRBC # BLD: 0 /100 WBCS — SIGNIFICANT CHANGE UP (ref 0–0)
NRBC # FLD: 0 K/UL — SIGNIFICANT CHANGE UP (ref 0–0)
PCO2 BLDV: 44 MMHG — HIGH (ref 39–42)
PH BLDV: 7.39 — SIGNIFICANT CHANGE UP (ref 7.32–7.43)
PLATELET # BLD AUTO: 311 K/UL — SIGNIFICANT CHANGE UP (ref 150–400)
PO2 BLDV: 20 MMHG — SIGNIFICANT CHANGE UP
POTASSIUM BLDV-SCNC: 4.1 MMOL/L — SIGNIFICANT CHANGE UP (ref 3.5–5.1)
POTASSIUM SERPL-MCNC: 4.3 MMOL/L — SIGNIFICANT CHANGE UP (ref 3.5–5.3)
POTASSIUM SERPL-SCNC: 4.3 MMOL/L — SIGNIFICANT CHANGE UP (ref 3.5–5.3)
PROT SERPL-MCNC: 7.3 G/DL — SIGNIFICANT CHANGE UP (ref 6–8.3)
RBC # BLD: 5.45 M/UL — HIGH (ref 3.8–5.2)
RBC # FLD: 14.9 % — HIGH (ref 10.3–14.5)
SAO2 % BLDV: 31.6 % — SIGNIFICANT CHANGE UP
SODIUM SERPL-SCNC: 138 MMOL/L — SIGNIFICANT CHANGE UP (ref 135–145)
WBC # BLD: 7.05 K/UL — SIGNIFICANT CHANGE UP (ref 3.8–10.5)
WBC # FLD AUTO: 7.05 K/UL — SIGNIFICANT CHANGE UP (ref 3.8–10.5)

## 2022-08-29 PROCEDURE — 71046 X-RAY EXAM CHEST 2 VIEWS: CPT | Mod: 26

## 2022-08-29 PROCEDURE — 99284 EMERGENCY DEPT VISIT MOD MDM: CPT

## 2022-08-29 RX ORDER — ACETAMINOPHEN 500 MG
650 TABLET ORAL ONCE
Refills: 0 | Status: COMPLETED | OUTPATIENT
Start: 2022-08-29 | End: 2022-08-29

## 2022-08-29 RX ADMIN — Medication 650 MILLIGRAM(S): at 22:04

## 2022-08-29 NOTE — ED PROVIDER NOTE - NSICDXPASTMEDICALHX_GEN_ALL_CORE_FT
PAST MEDICAL HISTORY:  Endometriosis Stage 4    Hashimoto's disease     Hypothyroid     Lupus     Migraine     Pregnant

## 2022-08-29 NOTE — ED PROVIDER NOTE - PROGRESS NOTE DETAILS
Aníbal: signed out to overnight team; pending labs and CXR; not hypoxic and well appearing; likely dc home PA Gerasimou- cxr wnl. pt spiked fever will give tylenol and dc home. pt amenable for dc home with supportive care. nad. well appearing.

## 2022-08-29 NOTE — ED PROVIDER NOTE - CLINICAL SUMMARY MEDICAL DECISION MAKING FREE TEXT BOX
Pt is a 40 y/o female w/ PMH significant for Lupus, mainly w/ skin and bone manifestation, and spondylitis, on Hydroxychloroquine, questionable Sarcoid, p/w 1 day of chill, coughing and myalgia.   Day 2 of symptoms in Immunocompromised patient.  Have been fully vaccinated and boosted for COVID.  Will obtain baseline labs, CXR.  Pt is not hypoxic; Will likely d/c home.  Will reassess.

## 2022-08-29 NOTE — ED PROVIDER NOTE - OBJECTIVE STATEMENT
Pt is a 40 y/o female w/ PMH significant for Lupus, mainly w/ skin and bone manifestation, and spondylitis, on Hydroxychloroquine, questionable Sarcoid, p/w 1 day of chill, coughing and myalgia. Admits to being tested positive this morning for COVID. Denies chest pain and SOB. Reports being vaccinated+ booster; admits to having menses at the moment.

## 2022-08-29 NOTE — ED PROVIDER NOTE - CARDIAC, MLM
[Negative] : Constitutional
Normal rate, regular rhythm.  Heart sounds S1, S2.  No murmurs, rubs or gallops. Normotensive

## 2022-08-29 NOTE — ED ADULT NURSE NOTE - OBJECTIVE STATEMENT
Pt A&Ox4, ambulatory at baseline. Pt in NAD, resp equal and unlabored. pmhx: Lupus. Pt endorses COVID + at home test. PT started feeling symptoms last night, congestion, cough, chills. Pt denies; SOB, CP, n/v, dizziness, syncope. 2 G to r ac.

## 2022-08-29 NOTE — ED ADULT TRIAGE NOTE - CHIEF COMPLAINT QUOTE
Pt c/o sore throat, cough, congestion since yesterday, had a positive home covid test. Denies SOB or chest pain. Pt with history of Lupus and Hashimoto's, on immunosuppressants, so concerned.

## 2022-08-29 NOTE — ED PROVIDER NOTE - NSFOLLOWUPINSTRUCTIONS_ED_ALL_ED_FT
-- Regardless of vaccination status, stay home for 5 days. If you have no symptoms after 5 days, you can leave your house. Continue to wear a mask around others for 5 ADDITIONAL days. If you have a fever, continue to stay home until your fever resolves.    -- Rest, increase your fluid intake, and avoid dehydration.  -- You can check your oxygen levels at home with a Pulse Oximeter device (pulse ox).  -- Check your temperature at home with a thermometer, take Tylenol for fever of 100.4 or greater.  -- It is very important to be diligent about hand washing & hygiene, wearing a mask, and self quarantining to avoid spreading the illness to others.  -- If you are having any new or worsening symptoms, such as shortness of breath, chest pain, please see your doctor or return to the Emergency Department.

## 2022-08-29 NOTE — ED PROVIDER NOTE - PATIENT PORTAL LINK FT
You can access the FollowMyHealth Patient Portal offered by Health system by registering at the following website: [email or web address]. By joining [employer]’s FollowMyHealth portal, you will also be able to view your health information using other applications (apps) compatible with our system.

## 2022-08-30 VITALS
TEMPERATURE: 101 F | OXYGEN SATURATION: 100 % | RESPIRATION RATE: 17 BRPM | SYSTOLIC BLOOD PRESSURE: 124 MMHG | DIASTOLIC BLOOD PRESSURE: 70 MMHG | HEART RATE: 105 BPM

## 2022-08-30 RX ORDER — ACETAMINOPHEN 500 MG
650 TABLET ORAL ONCE
Refills: 0 | Status: COMPLETED | OUTPATIENT
Start: 2022-08-30 | End: 2022-08-30

## 2022-08-30 RX ADMIN — Medication 650 MILLIGRAM(S): at 00:20

## 2022-08-31 ENCOUNTER — APPOINTMENT (OUTPATIENT)
Dept: INTERNAL MEDICINE | Facility: CLINIC | Age: 42
End: 2022-08-31

## 2022-12-14 NOTE — ED PROVIDER NOTE - DURATION
10/day(s) Lymphedema Physical Therapy Treatment Note    Patient Name: Tere Del Cid  YOB: 1974  MRN: [id number]  Referring Physician: Estee Smart MD  Diagnosis:   1. Lymphedema      Visit 21 / 29  1 visit authorized 8/15/22.  12 visits authorized 10/03/22.  8 visits requested on 11/7/22.  8 visits requested 12/14/22.    Reason for Visit:  Patient presents to lymphedema clinic for treatment of B LE. Patient is accompanied by no one.    Subjective  Pt reports Kareen at [facility] had mis-communicated with the  regarding compression garments and the garments had not been re-ordered yet. States the knee high compression garments have now been re-ordered and will be expedited.     Clinical Objective Findings  Pt presents with multi-layer compression bandages donned and intact on B LEs.    [employer] Measurements:  General Measurements (cm)   Left Right  A) 42.5 44.5  B) 71.0 70.0  C) 25.1 24.5    Full Leg (cm)   Left Right  63 cm 65.5 64.5  56 cm 61.5 59.0  48 cm 56.2 52.5  38 cm 39.2 39.5  28 cm 40.2 41.2  21 cm 36.2 36.0  13 cm 30.4 30.0  6 cm 26.8 26.3  0 cm 31.0 31.5    Past Volumetric Measurements: Measured Every 4 cm   R LE Circ (cm) R Seg Vol (ml) L LE Circ (cm) L Seg Vol (ml)      12/12/22     4241.0 R  4307.5 L  12/5/22     4390.5 R  4386.6 L  11/28/22     4482.4 R   4399.9 L  11/16/22     4686.0 R  4471.1 L  11/9/22     4602.7 R  4600.6 L  11/2/22     5500.7 R  5322.6 L  10/24/22     4881.2 R  5006.5 L  10/17/22     5030.1 R  4874.9 L  10/10/22     5416.8 R  5568.6 L  10/3/22     5473.3 R  5493.7 L  9/26/22     7527.9 R  7188.2 L  Right Limb - Left Limb (ml)   339.8  % Difference (R-L)/L    4.73%    Treatment  Multi-layer compression bandages removed from B LEs. Pt measured for K[employer] lower extremity pump this date to be submitted via fax as noted above. B LEs washed with soap, water, and dried. Patient received MLD to B LE including short neck sequence, superficial and deep  abdominal sequences, and B LE sequences drained to ipsilateral inguinal lymph nodes. X 45 mins    Eucerin applied to B LEs to address and prevent dry skin.     Knee high compression bandaging applied to B LE's utilizing: stockinette, 10 cm Artiflex, 1/2\" gray foam applied to L 1st, 2nd, and 3rd toes, Komprex foam applied to L 4th toe, Elastomull toe bandages doubled, orange Komprex foam on dorsum of L foot, 1/2\" gray foam R dorsum pad, malleolar pads and lower leg pads, blue foam padding at anterior aspect of B lower legs and L anterior ankle, Komprex II channeled foam applied to posterior aspects of B LEs and L medial malleoli to address induration and swelling, 8 cm (1), 10 cm (2), and 12 cm (3) short stretch bandages. Patient instructed in bandaging precautions and care. X 24 mins     Assessment  Patient tolerating treatment well with ability to maintain compression bandages donned without issues since last session. Pt measured for WIB compression pump as noted above with information faxed to vendor this date. Pt's knee high compression garments were lost in transit and are being remade. Areas of induration at the posterior aspects of B LEs continue to stay pliable with use of Komprex II channeled foam. Blue foam applied to anterior aspect of B lower legs to decrease tenderness at pt's B tibia continues to be effective. Mepilex lite added to the B ankle crease to address soreness. Patient continues to progress well with therapy and progressing towards discharge as we wait her garments from compression vendor .                Patient will continue to benefit from skilled PT (while awaiting transition to custom fit compression garments) which includes manual lymphatic drainage and multilayer bandaging to decrease the risk of lymphedema exacerbation, infection and limb heaviness which may disrupt her daily functional mobility status.                Rec at discharge - night time garments to maintain  optimal reduction.                           Short Term Goals:  1. Patient and/or caregiver will understand lymphedema precautions to decrease the risk of infection and exacerbation of the lymphedema.- progressing   2. Patient will develop a tolerance for wearing multi-layer, short-stretch bandages between treatment sessions to facilitate limb decongestion.- progressing   3. Patient will engage in a home exercise program (HEP) with minimal assistance to help improve lymphatic flow and venous return- progressing well .    Long Term Goals By Discharge:  1. Patient will be independent with donning and doffing of compression garments which will enable daily garment wear. - will address when appropriate    2. Treatment will achieve maximum edema and/or lymphedema reduction to enable functional improvements such as improved balance, reduced risk of falling, fitting into standard-size clothing and shoes, and a return to a prior level of function. - Progressing. Pt demonstrates a decrease of 43.66 R LE and 40.08 % L LE total limb volume since. evaluation.  3. Patient and/or caregiver will be independent with HEP and lymphedema management to help prevent edema relapse and reduce the risk of infection- progressing .  4. Patient will improve Lymphedema Life Impact Scale (LLIS) score by 9 points or better for improved quality of life and reduction in functional impairments, activity limitations, and participation restrictions.- not met     Plan  Will plan to see patient 2 days/week for 10 weeks with treatment to include manual therapy, therapeutic activity, therapeutic exercise, compression garment fitting/education, patient education. Plan to continue with multi-layer compression bandaging until pt has transitioned into compression garments. Awaiting garments from .    Signature  Tamara Ortiz PTA CLT  12/14/2022 1:04 PM     Billing provider : Irish Marcum MD [[id number]]

## 2023-04-02 ENCOUNTER — NON-APPOINTMENT (OUTPATIENT)
Age: 43
End: 2023-04-02

## 2023-04-17 NOTE — CHART NOTE - NSCHARTNOTESELECT_GEN_ALL_CORE
Nutrition Services Unsure etiology but possibly hypovolemic. Na did drop a few points with IVF  -If repeat BMP with worsening IVF, likely SIADH  -If further drop in Na and patient still requires IVF, will need to consult renal for 2% vs 3% saline

## 2023-09-09 ENCOUNTER — NON-APPOINTMENT (OUTPATIENT)
Age: 43
End: 2023-09-09

## 2023-12-22 ENCOUNTER — NON-APPOINTMENT (OUTPATIENT)
Age: 43
End: 2023-12-22

## 2024-03-21 ENCOUNTER — RX CHANGE (OUTPATIENT)
Age: 44
End: 2024-03-21

## 2024-06-10 ENCOUNTER — NON-APPOINTMENT (OUTPATIENT)
Age: 44
End: 2024-06-10

## 2024-06-11 ENCOUNTER — INPATIENT (INPATIENT)
Facility: HOSPITAL | Age: 44
LOS: 2 days | Discharge: HOME | End: 2024-06-14
Attending: INTERNAL MEDICINE | Admitting: INTERNAL MEDICINE
Payer: COMMERCIAL

## 2024-06-11 VITALS
DIASTOLIC BLOOD PRESSURE: 73 MMHG | WEIGHT: 156.97 LBS | SYSTOLIC BLOOD PRESSURE: 107 MMHG | TEMPERATURE: 98 F | HEART RATE: 127 BPM | OXYGEN SATURATION: 100 % | HEIGHT: 62 IN | RESPIRATION RATE: 17 BRPM

## 2024-06-11 DIAGNOSIS — Z90.49 ACQUIRED ABSENCE OF OTHER SPECIFIED PARTS OF DIGESTIVE TRACT: Chronic | ICD-10-CM

## 2024-06-11 DIAGNOSIS — Z98.890 OTHER SPECIFIED POSTPROCEDURAL STATES: Chronic | ICD-10-CM

## 2024-06-11 DIAGNOSIS — Z90.722 ACQUIRED ABSENCE OF OVARIES, BILATERAL: Chronic | ICD-10-CM

## 2024-06-11 LAB
ADD ON TEST-SPECIMEN IN LAB: SIGNIFICANT CHANGE UP
ALBUMIN SERPL ELPH-MCNC: 4 G/DL — SIGNIFICANT CHANGE UP (ref 3.3–5)
ALP SERPL-CCNC: 165 U/L — HIGH (ref 40–120)
ALT FLD-CCNC: 25 U/L — SIGNIFICANT CHANGE UP (ref 4–33)
ANION GAP SERPL CALC-SCNC: 12 MMOL/L — SIGNIFICANT CHANGE UP (ref 7–14)
APPEARANCE UR: ABNORMAL
APTT BLD: 33.5 SEC — SIGNIFICANT CHANGE UP (ref 24.5–35.6)
AST SERPL-CCNC: 12 U/L — SIGNIFICANT CHANGE UP (ref 4–32)
BACTERIA # UR AUTO: ABNORMAL /HPF
BASE EXCESS BLDV CALC-SCNC: -0.5 MMOL/L — SIGNIFICANT CHANGE UP (ref -2–3)
BASOPHILS # BLD AUTO: 0.05 K/UL — SIGNIFICANT CHANGE UP (ref 0–0.2)
BASOPHILS NFR BLD AUTO: 0.6 % — SIGNIFICANT CHANGE UP (ref 0–2)
BILIRUB SERPL-MCNC: <0.2 MG/DL — SIGNIFICANT CHANGE UP (ref 0.2–1.2)
BILIRUB UR-MCNC: NEGATIVE — SIGNIFICANT CHANGE UP
BLOOD GAS VENOUS COMPREHENSIVE RESULT: SIGNIFICANT CHANGE UP
BUN SERPL-MCNC: 15 MG/DL — SIGNIFICANT CHANGE UP (ref 7–23)
CALCIUM SERPL-MCNC: 9.1 MG/DL — SIGNIFICANT CHANGE UP (ref 8.4–10.5)
CAST: 0 /LPF — SIGNIFICANT CHANGE UP (ref 0–4)
CHLORIDE BLDV-SCNC: 108 MMOL/L — SIGNIFICANT CHANGE UP (ref 96–108)
CHLORIDE SERPL-SCNC: 106 MMOL/L — SIGNIFICANT CHANGE UP (ref 98–107)
CO2 BLDV-SCNC: 27.3 MMOL/L — HIGH (ref 22–26)
CO2 SERPL-SCNC: 23 MMOL/L — SIGNIFICANT CHANGE UP (ref 22–31)
COLOR SPEC: YELLOW — SIGNIFICANT CHANGE UP
CREAT SERPL-MCNC: 0.51 MG/DL — SIGNIFICANT CHANGE UP (ref 0.5–1.3)
DIFF PNL FLD: ABNORMAL
EGFR: 119 ML/MIN/1.73M2 — SIGNIFICANT CHANGE UP
EOSINOPHIL # BLD AUTO: 0.1 K/UL — SIGNIFICANT CHANGE UP (ref 0–0.5)
EOSINOPHIL NFR BLD AUTO: 1.2 % — SIGNIFICANT CHANGE UP (ref 0–6)
GAS PNL BLDV: 139 MMOL/L — SIGNIFICANT CHANGE UP (ref 136–145)
GLUCOSE BLDV-MCNC: 122 MG/DL — HIGH (ref 70–99)
GLUCOSE SERPL-MCNC: 126 MG/DL — HIGH (ref 70–99)
GLUCOSE UR QL: NEGATIVE MG/DL — SIGNIFICANT CHANGE UP
HCG SERPL-ACNC: 2.1 MIU/ML — SIGNIFICANT CHANGE UP
HCO3 BLDV-SCNC: 26 MMOL/L — SIGNIFICANT CHANGE UP (ref 22–29)
HCT VFR BLD CALC: 34.1 % — LOW (ref 34.5–45)
HCT VFR BLDA CALC: 32 % — LOW (ref 34.5–46.5)
HGB BLD CALC-MCNC: 10.5 G/DL — LOW (ref 11.7–16.1)
HGB BLD-MCNC: 10.2 G/DL — LOW (ref 11.5–15.5)
IANC: 5.29 K/UL — SIGNIFICANT CHANGE UP (ref 1.8–7.4)
IMM GRANULOCYTES NFR BLD AUTO: 0.5 % — SIGNIFICANT CHANGE UP (ref 0–0.9)
INR BLD: 1.05 RATIO — SIGNIFICANT CHANGE UP (ref 0.85–1.18)
KETONES UR-MCNC: NEGATIVE MG/DL — SIGNIFICANT CHANGE UP
LACTATE BLDV-MCNC: 1.7 MMOL/L — SIGNIFICANT CHANGE UP (ref 0.5–2)
LEUKOCYTE ESTERASE UR-ACNC: ABNORMAL
LIDOCAIN IGE QN: 53 U/L — SIGNIFICANT CHANGE UP (ref 7–60)
LYMPHOCYTES # BLD AUTO: 2.21 K/UL — SIGNIFICANT CHANGE UP (ref 1–3.3)
LYMPHOCYTES # BLD AUTO: 27 % — SIGNIFICANT CHANGE UP (ref 13–44)
MCHC RBC-ENTMCNC: 20.1 PG — LOW (ref 27–34)
MCHC RBC-ENTMCNC: 29.9 GM/DL — LOW (ref 32–36)
MCV RBC AUTO: 67.3 FL — LOW (ref 80–100)
MONOCYTES # BLD AUTO: 0.5 K/UL — SIGNIFICANT CHANGE UP (ref 0–0.9)
MONOCYTES NFR BLD AUTO: 6.1 % — SIGNIFICANT CHANGE UP (ref 2–14)
NEUTROPHILS # BLD AUTO: 5.29 K/UL — SIGNIFICANT CHANGE UP (ref 1.8–7.4)
NEUTROPHILS NFR BLD AUTO: 64.6 % — SIGNIFICANT CHANGE UP (ref 43–77)
NITRITE UR-MCNC: NEGATIVE — SIGNIFICANT CHANGE UP
NRBC # BLD: 0 /100 WBCS — SIGNIFICANT CHANGE UP (ref 0–0)
NRBC # FLD: 0 K/UL — SIGNIFICANT CHANGE UP (ref 0–0)
NT-PROBNP SERPL-SCNC: <36 PG/ML — SIGNIFICANT CHANGE UP
PCO2 BLDV: 49 MMHG — SIGNIFICANT CHANGE UP (ref 39–52)
PH BLDV: 7.33 — SIGNIFICANT CHANGE UP (ref 7.32–7.43)
PH UR: 5.5 — SIGNIFICANT CHANGE UP (ref 5–8)
PLATELET # BLD AUTO: 387 K/UL — SIGNIFICANT CHANGE UP (ref 150–400)
PO2 BLDV: 31 MMHG — SIGNIFICANT CHANGE UP (ref 25–45)
POTASSIUM BLDV-SCNC: 3.7 MMOL/L — SIGNIFICANT CHANGE UP (ref 3.5–5.1)
POTASSIUM SERPL-MCNC: 3.7 MMOL/L — SIGNIFICANT CHANGE UP (ref 3.5–5.3)
POTASSIUM SERPL-SCNC: 3.7 MMOL/L — SIGNIFICANT CHANGE UP (ref 3.5–5.3)
PROT SERPL-MCNC: 7.6 G/DL — SIGNIFICANT CHANGE UP (ref 6–8.3)
PROT UR-MCNC: 30 MG/DL
PROTHROM AB SERPL-ACNC: 11.7 SEC — SIGNIFICANT CHANGE UP (ref 9.5–13)
RBC # BLD: 5.07 M/UL — SIGNIFICANT CHANGE UP (ref 3.8–5.2)
RBC # FLD: 17.1 % — HIGH (ref 10.3–14.5)
RBC CASTS # UR COMP ASSIST: 175 /HPF — HIGH (ref 0–4)
SAO2 % BLDV: 49.3 % — LOW (ref 67–88)
SODIUM SERPL-SCNC: 141 MMOL/L — SIGNIFICANT CHANGE UP (ref 135–145)
SP GR SPEC: 1.02 — SIGNIFICANT CHANGE UP (ref 1–1.03)
SQUAMOUS # UR AUTO: 9 /HPF — HIGH (ref 0–5)
TROPONIN T, HIGH SENSITIVITY RESULT: <6 NG/L — SIGNIFICANT CHANGE UP
UROBILINOGEN FLD QL: 0.2 MG/DL — SIGNIFICANT CHANGE UP (ref 0.2–1)
WBC # BLD: 8.19 K/UL — SIGNIFICANT CHANGE UP (ref 3.8–10.5)
WBC # FLD AUTO: 8.19 K/UL — SIGNIFICANT CHANGE UP (ref 3.8–10.5)
WBC UR QL: 10 /HPF — HIGH (ref 0–5)

## 2024-06-11 PROCEDURE — 74177 CT ABD & PELVIS W/CONTRAST: CPT | Mod: 26,MC

## 2024-06-11 PROCEDURE — 71275 CT ANGIOGRAPHY CHEST: CPT | Mod: 26,MC

## 2024-06-11 PROCEDURE — 99285 EMERGENCY DEPT VISIT HI MDM: CPT

## 2024-06-11 RX ORDER — MORPHINE SULFATE 50 MG/1
4 CAPSULE, EXTENDED RELEASE ORAL ONCE
Refills: 0 | Status: DISCONTINUED | OUTPATIENT
Start: 2024-06-11 | End: 2024-06-11

## 2024-06-11 RX ORDER — PIPERACILLIN AND TAZOBACTAM 4; .5 G/20ML; G/20ML
3.38 INJECTION, POWDER, LYOPHILIZED, FOR SOLUTION INTRAVENOUS ONCE
Refills: 0 | Status: COMPLETED | OUTPATIENT
Start: 2024-06-11 | End: 2024-06-11

## 2024-06-11 RX ORDER — SODIUM CHLORIDE 9 MG/ML
1000 INJECTION INTRAMUSCULAR; INTRAVENOUS; SUBCUTANEOUS ONCE
Refills: 0 | Status: COMPLETED | OUTPATIENT
Start: 2024-06-11 | End: 2024-06-11

## 2024-06-11 RX ORDER — ACETAMINOPHEN 500 MG
1000 TABLET ORAL ONCE
Refills: 0 | Status: COMPLETED | OUTPATIENT
Start: 2024-06-11 | End: 2024-06-11

## 2024-06-11 RX ADMIN — MORPHINE SULFATE 4 MILLIGRAM(S): 50 CAPSULE, EXTENDED RELEASE ORAL at 17:16

## 2024-06-11 RX ADMIN — MORPHINE SULFATE 4 MILLIGRAM(S): 50 CAPSULE, EXTENDED RELEASE ORAL at 17:43

## 2024-06-11 RX ADMIN — SODIUM CHLORIDE 1000 MILLILITER(S): 9 INJECTION INTRAMUSCULAR; INTRAVENOUS; SUBCUTANEOUS at 17:20

## 2024-06-11 RX ADMIN — Medication 1000 MILLIGRAM(S): at 17:43

## 2024-06-11 RX ADMIN — PIPERACILLIN AND TAZOBACTAM 200 GRAM(S): 4; .5 INJECTION, POWDER, LYOPHILIZED, FOR SOLUTION INTRAVENOUS at 17:20

## 2024-06-11 RX ADMIN — MORPHINE SULFATE 4 MILLIGRAM(S): 50 CAPSULE, EXTENDED RELEASE ORAL at 22:17

## 2024-06-11 RX ADMIN — Medication 400 MILLIGRAM(S): at 17:13

## 2024-06-11 NOTE — ED PROVIDER NOTE - OBJECTIVE STATEMENT
43-year-old female with past medical history of SLE, PSHx hysterectomy back in October 2023 complicated by ureter clamping with possible fistula, status post ureter stent, course since then has been complicated by multiple episodes of pyelonephritis resulting in septic shock, presenting to the ED with concerns for reoccurrence of her pyelonephritis.  Patient states for the past 4 days she is having worsening right flank pain with pain with urination.  Patient had a UA from urgent care which showed blood in the urine.  Patient also reports some nausea and vomiting and chills.  Patient states that she has a history of drug-resistant UTI, but is unable to remember which drugs.  Patient does remember having Vanco resistant UTI.

## 2024-06-11 NOTE — ED ADULT TRIAGE NOTE - MODE OF ARRIVAL
Walk in
I will START or STAY ON the medications listed below when I get home from the hospital:    acetaminophen-oxycodone 325 mg-10 mg oral tablet  -- 1 tab(s) by mouth every 6 hours, As Needed -for moderate pain MDD:4  -- Caution federal law prohibits the transfer of this drug to any person other  than the person for whom it was prescribed.  May cause drowsiness.  Alcohol may intensify this effect.  Use care when operating dangerous machinery.  This prescription cannot be refilled.  This product contains acetaminophen.  Do not use  with any other product containing acetaminophen to prevent possible liver damage.  Using more of this medication than prescribed may cause serious breathing problems.    -- Indication: For pain    lisinopril 10 mg oral tablet  -- 1 tab(s) by mouth once a day  -- Indication: For blood pressure    zolpidem 5 mg oral tablet  -- 1 tab(s) by mouth once a day (at bedtime), As Needed  -- Indication: For sleep    famotidine 20 mg oral tablet  --  by mouth   -- preop med  -- Indication: For gerd    Humira Pen 40 mg/0.8 mL subcutaneous kit  --  subcutaneous every 2 weeks  -- Indication: For diabetes

## 2024-06-11 NOTE — ED ADULT NURSE NOTE - OBJECTIVE STATEMENT
pt received to intake room 1 A&Ox4, ambulatory, accompanied by family member coming to the ED for c/o right sided flank pain and dysuria x 3 days. Patient states to have gone to urgent care and they sent her to ED to rule out pyelonephritis. Patient states at urgent care "they saw blood in the urine." Patient denies fevers, chills, N/V/D, abdominal pain, headache, dizziness, chest pain, sob. RR equal and unlabored. Abdomen soft, non-tender, non-distended. 20G IV placed in the R AC, labs drawn and sent. Medicated as ordered. Care plan continued. Comfort measures provided. Safety maintained. Awaiting lab results and imaging.

## 2024-06-11 NOTE — ED PROVIDER NOTE - PHYSICAL EXAMINATION
Const: appears short of breath and congested  Eyes: no conjunctival injection  HEENT: Head NCAT, Moist MM.  Neck: Trachea midline.   CVS: +S1/S2, Peripheral pulses 2+ and equal in all extremities.  RESP: Unlabored respiratory effort. Clear to auscultation bilaterally.  GI: Nontender/Nondistended, R CVA tenderness  MSK: Normocephalic/Atraumatic, No Lower Extremities edema b/l.   Skin: Intact.   Neuro: Motor & Sensation grossly intact.  Psych: Awake, Alert, & Cooperative

## 2024-06-11 NOTE — ED PROVIDER NOTE - CLINICAL SUMMARY MEDICAL DECISION MAKING FREE TEXT BOX
43-year-old female with past medical history of hysterectomy back in October 2023 complicated by ureter clamping with possible fistula, status post ureter stent, course since then has been complicated by multiple episodes of pyelonephritis resulting in septic shock, presenting to the ED with concerns for reoccurrence of her pyelonephritis.  Patient noted to be tachycardic to the 110s.  Patient states that she does have a history of being tachycardic and has to be evaluate by cardiology.  Physical exam with heart regular rate and rhythm, lungs clear to auscultation, abdomen soft nondistended nontender.  Patient with right CVA tenderness.  Concern for pyelonephritis, PE, other intra-abdominal pathology.  Will get septic workup including Trope, BNP, lipase, hCG.  Will get CT angio of the chest and CT abdomen pelvis to assess for PE/pyelonephritis.  Will empirically antibiosis with Zosyn.  Will administer 1 L normal saline, morphine, Tylenol for pain control.  Patient likely to be admitted given history.

## 2024-06-11 NOTE — ED PROVIDER NOTE - PROGRESS NOTE DETAILS
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 Pt reports multiple drug resistances however no cultures in our system, pt given IV zosyn, UA with few bacteria however pt is on daily abx, will require admission for IV abx for pyelo, discussed with hospitalist

## 2024-06-11 NOTE — ED PROVIDER NOTE - ATTENDING CONTRIBUTION TO CARE
Attending note:   After face to face evaluation of this patient, I concur with above noted hx, pe, and care plan for this patient.  Llanos: 43-year-old female with history of hysterectomy 8 months ago complicated by nicked ureter with fistula that was repaired few months ago.  Patient last had a stent placed few weeks ago.  Patient's history of multiple episodes of pyelonephritis including septic shock.  Patient is currently on Keflex prophylactically/for treatment last final episode.  Patient at home noted foul-smelling urine and left-sided flank pain and left lower abdominal pain since last night.  Patient is also notably tacky.  Patient is afebrile in the ED but is very tender in the left CVA and left lower quadrant.  Patient's blood pressure is borderline and heart rate is elevated.  Lungs are clear and heart is regular rate and rhythm.  Patient does note congestion and does have a lot of mucus in her naris.  Oropharynx however is normal.  There is no pitting edema.  There is no rashes noted.  Patient was awake alert and mentating well.  Given tenderness will give pain medication and check labs.  Of note patient did mention to resident that she is short of breath this is a new finding for her.  Patient has been immobile for more than usual given multiple hospitalizations.  Will also get PE study.  Patient will require admission until sensitivities can be obtained.  Will start with broad-spectrum for now for antibiotics.

## 2024-06-12 DIAGNOSIS — Z29.9 ENCOUNTER FOR PROPHYLACTIC MEASURES, UNSPECIFIED: ICD-10-CM

## 2024-06-12 DIAGNOSIS — E03.9 HYPOTHYROIDISM, UNSPECIFIED: ICD-10-CM

## 2024-06-12 DIAGNOSIS — D64.9 ANEMIA, UNSPECIFIED: ICD-10-CM

## 2024-06-12 DIAGNOSIS — N12 TUBULO-INTERSTITIAL NEPHRITIS, NOT SPECIFIED AS ACUTE OR CHRONIC: ICD-10-CM

## 2024-06-12 DIAGNOSIS — R09.89 OTHER SPECIFIED SYMPTOMS AND SIGNS INVOLVING THE CIRCULATORY AND RESPIRATORY SYSTEMS: ICD-10-CM

## 2024-06-12 DIAGNOSIS — N80.9 ENDOMETRIOSIS, UNSPECIFIED: ICD-10-CM

## 2024-06-12 DIAGNOSIS — N39.0 URINARY TRACT INFECTION, SITE NOT SPECIFIED: ICD-10-CM

## 2024-06-12 DIAGNOSIS — M54.50 LOW BACK PAIN, UNSPECIFIED: ICD-10-CM

## 2024-06-12 DIAGNOSIS — F41.9 ANXIETY DISORDER, UNSPECIFIED: ICD-10-CM

## 2024-06-12 DIAGNOSIS — Z86.69 PERSONAL HISTORY OF OTHER DISEASES OF THE NERVOUS SYSTEM AND SENSE ORGANS: ICD-10-CM

## 2024-06-12 DIAGNOSIS — K59.00 CONSTIPATION, UNSPECIFIED: ICD-10-CM

## 2024-06-12 LAB
ANION GAP SERPL CALC-SCNC: 9 MMOL/L — SIGNIFICANT CHANGE UP (ref 7–14)
BASOPHILS # BLD AUTO: 0.05 K/UL — SIGNIFICANT CHANGE UP (ref 0–0.2)
BASOPHILS NFR BLD AUTO: 0.7 % — SIGNIFICANT CHANGE UP (ref 0–2)
BUN SERPL-MCNC: 10 MG/DL — SIGNIFICANT CHANGE UP (ref 7–23)
CALCIUM SERPL-MCNC: 9.2 MG/DL — SIGNIFICANT CHANGE UP (ref 8.4–10.5)
CHLORIDE SERPL-SCNC: 107 MMOL/L — SIGNIFICANT CHANGE UP (ref 98–107)
CO2 SERPL-SCNC: 25 MMOL/L — SIGNIFICANT CHANGE UP (ref 22–31)
CREAT SERPL-MCNC: 0.63 MG/DL — SIGNIFICANT CHANGE UP (ref 0.5–1.3)
EGFR: 114 ML/MIN/1.73M2 — SIGNIFICANT CHANGE UP
EOSINOPHIL # BLD AUTO: 0.19 K/UL — SIGNIFICANT CHANGE UP (ref 0–0.5)
EOSINOPHIL NFR BLD AUTO: 2.8 % — SIGNIFICANT CHANGE UP (ref 0–6)
FERRITIN SERPL-MCNC: 26 NG/ML — SIGNIFICANT CHANGE UP (ref 15–150)
FLUAV AG NPH QL: SIGNIFICANT CHANGE UP
FLUBV AG NPH QL: SIGNIFICANT CHANGE UP
GLUCOSE SERPL-MCNC: 96 MG/DL — SIGNIFICANT CHANGE UP (ref 70–99)
HCT VFR BLD CALC: 34.1 % — LOW (ref 34.5–45)
HGB BLD-MCNC: 10.4 G/DL — LOW (ref 11.5–15.5)
IANC: 3.02 K/UL — SIGNIFICANT CHANGE UP (ref 1.8–7.4)
IMM GRANULOCYTES NFR BLD AUTO: 0.4 % — SIGNIFICANT CHANGE UP (ref 0–0.9)
IRON SATN MFR SERPL: 21 UG/DL — LOW (ref 30–160)
IRON SATN MFR SERPL: 5 % — LOW (ref 14–50)
LYMPHOCYTES # BLD AUTO: 3.11 K/UL — SIGNIFICANT CHANGE UP (ref 1–3.3)
LYMPHOCYTES # BLD AUTO: 45.7 % — HIGH (ref 13–44)
MAGNESIUM SERPL-MCNC: 2.2 MG/DL — SIGNIFICANT CHANGE UP (ref 1.6–2.6)
MCHC RBC-ENTMCNC: 20.4 PG — LOW (ref 27–34)
MCHC RBC-ENTMCNC: 30.5 GM/DL — LOW (ref 32–36)
MCV RBC AUTO: 66.7 FL — LOW (ref 80–100)
MONOCYTES # BLD AUTO: 0.4 K/UL — SIGNIFICANT CHANGE UP (ref 0–0.9)
MONOCYTES NFR BLD AUTO: 5.9 % — SIGNIFICANT CHANGE UP (ref 2–14)
NEUTROPHILS # BLD AUTO: 3.02 K/UL — SIGNIFICANT CHANGE UP (ref 1.8–7.4)
NEUTROPHILS NFR BLD AUTO: 44.5 % — SIGNIFICANT CHANGE UP (ref 43–77)
NRBC # BLD: 0 /100 WBCS — SIGNIFICANT CHANGE UP (ref 0–0)
NRBC # FLD: 0 K/UL — SIGNIFICANT CHANGE UP (ref 0–0)
PHOSPHATE SERPL-MCNC: 4.1 MG/DL — SIGNIFICANT CHANGE UP (ref 2.5–4.5)
PLATELET # BLD AUTO: 376 K/UL — SIGNIFICANT CHANGE UP (ref 150–400)
POTASSIUM SERPL-MCNC: 3.7 MMOL/L — SIGNIFICANT CHANGE UP (ref 3.5–5.3)
POTASSIUM SERPL-SCNC: 3.7 MMOL/L — SIGNIFICANT CHANGE UP (ref 3.5–5.3)
RBC # BLD: 5.11 M/UL — SIGNIFICANT CHANGE UP (ref 3.8–5.2)
RBC # FLD: 17 % — HIGH (ref 10.3–14.5)
RSV RNA NPH QL NAA+NON-PROBE: SIGNIFICANT CHANGE UP
SARS-COV-2 RNA SPEC QL NAA+PROBE: SIGNIFICANT CHANGE UP
SODIUM SERPL-SCNC: 141 MMOL/L — SIGNIFICANT CHANGE UP (ref 135–145)
TIBC SERPL-MCNC: 407 UG/DL — SIGNIFICANT CHANGE UP (ref 220–430)
TSH SERPL-MCNC: 3.15 UIU/ML — SIGNIFICANT CHANGE UP (ref 0.27–4.2)
UIBC SERPL-MCNC: 386 UG/DL — HIGH (ref 110–370)
WBC # BLD: 6.8 K/UL — SIGNIFICANT CHANGE UP (ref 3.8–10.5)
WBC # FLD AUTO: 6.8 K/UL — SIGNIFICANT CHANGE UP (ref 3.8–10.5)

## 2024-06-12 PROCEDURE — 99223 1ST HOSP IP/OBS HIGH 75: CPT

## 2024-06-12 PROCEDURE — 99222 1ST HOSP IP/OBS MODERATE 55: CPT

## 2024-06-12 RX ORDER — PANTOPRAZOLE SODIUM 20 MG/1
1 TABLET, DELAYED RELEASE ORAL
Refills: 0 | DISCHARGE

## 2024-06-12 RX ORDER — SENNA PLUS 8.6 MG/1
2 TABLET ORAL
Refills: 0 | Status: DISCONTINUED | OUTPATIENT
Start: 2024-06-12 | End: 2024-06-14

## 2024-06-12 RX ORDER — ONDANSETRON 8 MG/1
4 TABLET, FILM COATED ORAL EVERY 6 HOURS
Refills: 0 | Status: DISCONTINUED | OUTPATIENT
Start: 2024-06-12 | End: 2024-06-14

## 2024-06-12 RX ORDER — MORPHINE SULFATE 50 MG/1
2 CAPSULE, EXTENDED RELEASE ORAL EVERY 6 HOURS
Refills: 0 | Status: DISCONTINUED | OUTPATIENT
Start: 2024-06-12 | End: 2024-06-13

## 2024-06-12 RX ORDER — FLUTICASONE PROPIONATE 50 MCG
1 SPRAY, SUSPENSION NASAL
Refills: 0 | Status: DISCONTINUED | OUTPATIENT
Start: 2024-06-12 | End: 2024-06-14

## 2024-06-12 RX ORDER — PIPERACILLIN AND TAZOBACTAM 4; .5 G/20ML; G/20ML
3.38 INJECTION, POWDER, LYOPHILIZED, FOR SOLUTION INTRAVENOUS EVERY 8 HOURS
Refills: 0 | Status: DISCONTINUED | OUTPATIENT
Start: 2024-06-12 | End: 2024-06-14

## 2024-06-12 RX ORDER — BENZOCAINE AND MENTHOL 5; 1 G/100ML; G/100ML
1 LIQUID ORAL EVERY 6 HOURS
Refills: 0 | Status: DISCONTINUED | OUTPATIENT
Start: 2024-06-12 | End: 2024-06-14

## 2024-06-12 RX ORDER — NORTRIPTYLINE HYDROCHLORIDE 10 MG/1
20 CAPSULE ORAL AT BEDTIME
Refills: 0 | Status: DISCONTINUED | OUTPATIENT
Start: 2024-06-12 | End: 2024-06-14

## 2024-06-12 RX ORDER — SENNA PLUS 8.6 MG/1
1 TABLET ORAL
Refills: 0 | DISCHARGE

## 2024-06-12 RX ORDER — ONDANSETRON 8 MG/1
1 TABLET, FILM COATED ORAL
Refills: 0 | DISCHARGE

## 2024-06-12 RX ORDER — LEVOTHYROXINE SODIUM 125 MCG
1 TABLET ORAL
Refills: 0 | DISCHARGE

## 2024-06-12 RX ORDER — NORTRIPTYLINE HYDROCHLORIDE 10 MG/1
2 CAPSULE ORAL
Refills: 0 | DISCHARGE

## 2024-06-12 RX ORDER — ALBUTEROL 90 UG/1
2 AEROSOL, METERED ORAL
Refills: 0 | DISCHARGE

## 2024-06-12 RX ORDER — MORPHINE SULFATE 50 MG/1
4 CAPSULE, EXTENDED RELEASE ORAL EVERY 6 HOURS
Refills: 0 | Status: DISCONTINUED | OUTPATIENT
Start: 2024-06-12 | End: 2024-06-13

## 2024-06-12 RX ORDER — LINACLOTIDE 145 UG/1
1 CAPSULE, GELATIN COATED ORAL
Refills: 0 | DISCHARGE

## 2024-06-12 RX ORDER — LEVOTHYROXINE SODIUM 125 MCG
112 TABLET ORAL DAILY
Refills: 0 | Status: DISCONTINUED | OUTPATIENT
Start: 2024-06-12 | End: 2024-06-14

## 2024-06-12 RX ORDER — LIDOCAINE 4 G/100G
1 CREAM TOPICAL EVERY 24 HOURS
Refills: 0 | Status: DISCONTINUED | OUTPATIENT
Start: 2024-06-12 | End: 2024-06-14

## 2024-06-12 RX ORDER — LIDOCAINE 4 G/100G
1 CREAM TOPICAL
Refills: 0 | DISCHARGE

## 2024-06-12 RX ORDER — ACETAMINOPHEN 500 MG
650 TABLET ORAL EVERY 6 HOURS
Refills: 0 | Status: DISCONTINUED | OUTPATIENT
Start: 2024-06-12 | End: 2024-06-14

## 2024-06-12 RX ORDER — ALPRAZOLAM 0.25 MG
1 TABLET ORAL
Refills: 0 | DISCHARGE

## 2024-06-12 RX ORDER — POLYETHYLENE GLYCOL 3350 17 G/17G
17 POWDER, FOR SOLUTION ORAL DAILY
Refills: 0 | Status: DISCONTINUED | OUTPATIENT
Start: 2024-06-12 | End: 2024-06-12

## 2024-06-12 RX ORDER — SENNA PLUS 8.6 MG/1
2 TABLET ORAL AT BEDTIME
Refills: 0 | Status: DISCONTINUED | OUTPATIENT
Start: 2024-06-12 | End: 2024-06-12

## 2024-06-12 RX ORDER — MORPHINE SULFATE 50 MG/1
4 CAPSULE, EXTENDED RELEASE ORAL ONCE
Refills: 0 | Status: DISCONTINUED | OUTPATIENT
Start: 2024-06-12 | End: 2024-06-12

## 2024-06-12 RX ORDER — SUMATRIPTAN SUCCINATE 4 MG/.5ML
1 INJECTION, SOLUTION SUBCUTANEOUS
Refills: 0 | DISCHARGE

## 2024-06-12 RX ORDER — CHOLECALCIFEROL (VITAMIN D3) 125 MCG
1 CAPSULE ORAL
Refills: 0 | DISCHARGE

## 2024-06-12 RX ORDER — ALPRAZOLAM 0.25 MG
0.5 TABLET ORAL AT BEDTIME
Refills: 0 | Status: DISCONTINUED | OUTPATIENT
Start: 2024-06-12 | End: 2024-06-13

## 2024-06-12 RX ORDER — PANTOPRAZOLE SODIUM 20 MG/1
40 TABLET, DELAYED RELEASE ORAL
Refills: 0 | Status: DISCONTINUED | OUTPATIENT
Start: 2024-06-12 | End: 2024-06-14

## 2024-06-12 RX ORDER — ALBUTEROL 90 UG/1
2 AEROSOL, METERED ORAL EVERY 6 HOURS
Refills: 0 | Status: DISCONTINUED | OUTPATIENT
Start: 2024-06-12 | End: 2024-06-14

## 2024-06-12 RX ORDER — CHOLECALCIFEROL (VITAMIN D3) 125 MCG
1000 CAPSULE ORAL DAILY
Refills: 0 | Status: DISCONTINUED | OUTPATIENT
Start: 2024-06-12 | End: 2024-06-14

## 2024-06-12 RX ORDER — LINACLOTIDE 145 UG/1
72 CAPSULE, GELATIN COATED ORAL
Refills: 0 | Status: DISCONTINUED | OUTPATIENT
Start: 2024-06-12 | End: 2024-06-14

## 2024-06-12 RX ORDER — ENOXAPARIN SODIUM 100 MG/ML
40 INJECTION SUBCUTANEOUS EVERY 24 HOURS
Refills: 0 | Status: DISCONTINUED | OUTPATIENT
Start: 2024-06-12 | End: 2024-06-14

## 2024-06-12 RX ORDER — POLYETHYLENE GLYCOL 3350 17 G/17G
17 POWDER, FOR SOLUTION ORAL
Refills: 0 | Status: DISCONTINUED | OUTPATIENT
Start: 2024-06-12 | End: 2024-06-14

## 2024-06-12 RX ORDER — FLUTICASONE PROPIONATE 50 MCG
1 SPRAY, SUSPENSION NASAL
Refills: 0 | DISCHARGE

## 2024-06-12 RX ADMIN — ENOXAPARIN SODIUM 40 MILLIGRAM(S): 100 INJECTION SUBCUTANEOUS at 17:11

## 2024-06-12 RX ADMIN — Medication 1000 UNIT(S): at 17:05

## 2024-06-12 RX ADMIN — BENZOCAINE AND MENTHOL 1 LOZENGE: 5; 1 LIQUID ORAL at 11:32

## 2024-06-12 RX ADMIN — Medication 650 MILLIGRAM(S): at 23:20

## 2024-06-12 RX ADMIN — ONDANSETRON 4 MILLIGRAM(S): 8 TABLET, FILM COATED ORAL at 10:18

## 2024-06-12 RX ADMIN — Medication 650 MILLIGRAM(S): at 22:21

## 2024-06-12 RX ADMIN — PIPERACILLIN AND TAZOBACTAM 25 GRAM(S): 4; .5 INJECTION, POWDER, LYOPHILIZED, FOR SOLUTION INTRAVENOUS at 18:37

## 2024-06-12 RX ADMIN — NORTRIPTYLINE HYDROCHLORIDE 20 MILLIGRAM(S): 10 CAPSULE ORAL at 22:50

## 2024-06-12 RX ADMIN — MORPHINE SULFATE 4 MILLIGRAM(S): 50 CAPSULE, EXTENDED RELEASE ORAL at 11:33

## 2024-06-12 RX ADMIN — MORPHINE SULFATE 4 MILLIGRAM(S): 50 CAPSULE, EXTENDED RELEASE ORAL at 10:33

## 2024-06-12 RX ADMIN — MORPHINE SULFATE 4 MILLIGRAM(S): 50 CAPSULE, EXTENDED RELEASE ORAL at 03:51

## 2024-06-12 RX ADMIN — POLYETHYLENE GLYCOL 3350 17 GRAM(S): 17 POWDER, FOR SOLUTION ORAL at 22:20

## 2024-06-12 RX ADMIN — SENNA PLUS 2 TABLET(S): 8.6 TABLET ORAL at 17:05

## 2024-06-12 RX ADMIN — MORPHINE SULFATE 4 MILLIGRAM(S): 50 CAPSULE, EXTENDED RELEASE ORAL at 17:06

## 2024-06-12 RX ADMIN — PIPERACILLIN AND TAZOBACTAM 25 GRAM(S): 4; .5 INJECTION, POWDER, LYOPHILIZED, FOR SOLUTION INTRAVENOUS at 10:18

## 2024-06-12 RX ADMIN — LIDOCAINE 1 PATCH: 4 CREAM TOPICAL at 22:22

## 2024-06-12 RX ADMIN — PIPERACILLIN AND TAZOBACTAM 25 GRAM(S): 4; .5 INJECTION, POWDER, LYOPHILIZED, FOR SOLUTION INTRAVENOUS at 03:51

## 2024-06-12 RX ADMIN — MORPHINE SULFATE 4 MILLIGRAM(S): 50 CAPSULE, EXTENDED RELEASE ORAL at 18:10

## 2024-06-12 RX ADMIN — ONDANSETRON 4 MILLIGRAM(S): 8 TABLET, FILM COATED ORAL at 17:06

## 2024-06-12 RX ADMIN — MORPHINE SULFATE 4 MILLIGRAM(S): 50 CAPSULE, EXTENDED RELEASE ORAL at 23:42

## 2024-06-12 NOTE — H&P ADULT - NSHPREVIEWOFSYSTEMS_GEN_ALL_CORE
Constitutional: +Chills. No generalized weakness, fever, or weight loss.  Eyes: No visual changes, double vision, or eye pain  Ears, Nose, Mouth, Throat: +Rhinorrhea and sore throat. No dysphagia or odynophagia.  Cardiovascular: +Palpitations when having pain. No chest pain or LE edema.  Respiratory: +Shortness of breath when having pain. No cough, wheezing, or hemoptysis.  Gastrointestinal: +Lower abdominal pain, nausea/vomiting, and constipation. No diarrhea, hematemesis, melena, or BRBPR.  Genitourinary: +Dysuria. No frequency, urgency, or hematuria.  Musculoskeletal: +Chronic lower back pain. No neck pain. No joint pain, swelling, or decreased ROM.  Skin: No pruritus, rashes, lesions, or wounds  Neurologic: No syncope, seizures, headache, paresthesias, numbness, or limb weakness  Psychiatric: No depression, anxiety, difficulty concentrating, anhedonia, or lack of energy  Endocrine: No heat/cold intolerance, mood swings, sweats, polydipsia, or polyuria  Hematologic/lymphatic: No purpura, petechia, or prolonged or excessive bleeding after dental extraction / injury    Positives and pertinent negatives noted and all other systems negative.

## 2024-06-12 NOTE — H&P ADULT - ASSESSMENT
44 yo woman with history of SLE (positive antibodies, but asymptomatic and not on any meds), chronic lower back pain 2/2 ankylosing spondylitis, hypothyroidism, anxiety, migraine headaches, ruptured appendix s/p appendectomy (at 13 years old), and endometriosis s/p hysterectomy (10/2023 at [facility]) c/b ureterovaginal fistula with recurrent UTIs, including pyelonephritis, and L ureteral stricture s/p stent placement x2 (most recently on 5/24/24 at [facility]) presents with L flank pain, suprapubic pain, and dysuria for the past 2-3 days, admitted with possible L ureteritis.

## 2024-06-12 NOTE — H&P ADULT - PROBLEM SELECTOR PLAN 5
Pt with history of Hashimoto's thyroiditis causing hypothyroidism. On levothyroxine 112 mcg daily. Pt reporting recent episodes of palpitations, though appears to be in setting of flank and suprapubic pain.   - TSH on admission wnl. C/w levothyroxine 112 mcg daily.  - CTA chest with IV contrast negative for PE  - If palpitations and/or tachycardia continue despite pain being under control, possible referral to cardiology for possible Holter monitoring

## 2024-06-12 NOTE — H&P ADULT - PROBLEM SELECTOR PLAN 4
Pt with ankylosing spondylitis causing chronic lower back pain.  - C/w lidocaine patch daily  - C/w nortriptyline 20 mg qHS  - PT consult  - Fall risk protocol

## 2024-06-12 NOTE — H&P ADULT - PROBLEM SELECTOR PLAN 3
Hgb 10.2 on admission. MCV 67.3. Pt with no S/S of active bleed at this time, but according to pt, had significant bleeding prior to and after her hysterectomy. Likely from iron deficiency anemia and possibly anemia of chronic disease.  - Check iron studies, folate, and vitamin B12  - Monitor H/H

## 2024-06-12 NOTE — H&P ADULT - NSICDXFAMILYHX_GEN_ALL_CORE_FT
FAMILY HISTORY:  Mother  Still living? Unknown  Family history of cerebrovascular accident (CVA), Age at diagnosis: Age Unknown  Family history of myocardial infarction, Age at diagnosis: Age Unknown

## 2024-06-12 NOTE — CONSULT NOTE ADULT - ASSESSMENT
43 f with hypothyroidism,  SLE (positive antibodies, but asymptomatic and not on any meds), chronic lower back pain 2/2 ankylosing spondylitis, hypothyroidism, ruptured appendix s/p appendectomy (at 13 years old), and endometriosis s/p hysterectomy (10/2023 at [facility]) c/b ureterovaginal fistula with recurrent UTIs and L ureteral stricture s/p stent placement x2 (most recently on 5/24/24 at [facility]), has been on suppressive keflex since 2 weeks before the procedure, p/w dysuria, suprapubic and L flank pain   afebrile, no WBC  u/a positive  chest/abd CTA: No PE, negative chest CTA, L ureteral stent with no hydro, mild L periureteric stranding    hysterectomy c/b ureterovaginal fistula and ureteral stricture with recurrent UTIs on keflex suppression s/p L stent 5/24 now with dysuria and L flank pain, CT with mild L periureteric stranding, will treat for pyelo    * follow the blood and urine cx  * c/w zosyn for now and will switch as per cultures    The above assessment and plan was discussed with the primary team    Susan Meléndez MD  contact on teams  After 5pm and on weekends call [phone]

## 2024-06-12 NOTE — H&P ADULT - NSHPLABSRESULTS_GEN_ALL_CORE
EKG personally reviewed.  Sinus tach at 117 bpm, no acute ischemic changes, QTc 429 ms.    Labs personally reviewed.                        10.4   6.80  )-----------( 376      ( 12 Jun 2024 06:58 )             34.1     06-12    141  |  107  |  10  ----------------------------<  96  3.7   |  25  |  0.63    Ca    9.2      12 Jun 2024 06:58  Phos  4.1     06-12  Mg     2.20     06-12    TPro  7.6  /  Alb  4.0  /  TBili  <0.2  /  DBili  x   /  AST  12  /  ALT  25  /  AlkPhos  165<H>  06-11    Urinalysis Basic - ( 12 Jun 2024 06:58 )  Color: x / Appearance: x / SG: x / pH: x  Gluc: 96 mg/dL / Ketone: x  / Bili: x / Urobili: x   Blood: x / Protein: x / Nitrite: x   Leuk Esterase: x / RBC: x / WBC x   Sq Epi: x / Non Sq Epi: x / Bacteria: x    Imaging personally reviewed.  ACC: [id number] EXAM:  CT ABDOMEN AND PELVIS IC   ORDERED BY: SYLVAIN LOPEZ   ACC: [id number] [facility]  [facility]   ORDERED BY: SYLVAIN LOPEZ   PROCEDURE DATE:  06/11/2024  FINDINGS:  CHEST:  LUNGS AND LARGE AIRWAYS: Patent central airways. Lungs clear.  PLEURA: No pleural effusion.  VESSELS: No pulmonary embolus. No thoracic aortic dissection or aneurysm.  HEART: Heart size is normal. No pericardial effusion.  MEDIASTINUM AND MAZIN: No lymphadenopathy.  CHEST WALL AND LOWER NECK: Within normal limits.    ABDOMEN AND PELVIS:  LIVER: Within normal limits.  BILE DUCTS: Normal caliber.  GALLBLADDER: Within normal limits.  SPLEEN: Within normal limits.  PANCREAS: Within normal limits.  ADRENALS: Within normal limits.  KIDNEYS/URETERS: Left ureteral stent extends from the renal pelvis into   the urinary bladder. Mild left periureteric stranding. No hydronephrosis   or renal stone. Normal enhancement of both kidneys. Normal right ureter.    BLADDER: Otherwise unremarkable.  REPRODUCTIVE ORGANS: Status post hysterectomy.    BOWEL: No bowel obstruction. Appendix not identified; no ancillary   findings of appendicitis.  PERITONEUM/RETROPERITONEUM: Within normal limits.  VESSELS: Within normal limits.  LYMPH NODES: No lymphadenopathy.  ABDOMINAL WALL: Within normal limits.  BONES: Within normal limits.    IMPRESSION:  No pulmonary embolus. Negative CTA chest.    Left ureteral stent in place with no hydronephrosis. Mild left   periureteric stranding could represent postprocedural changes or mild UTI.

## 2024-06-12 NOTE — H&P ADULT - PROBLEM SELECTOR PLAN 9
Pt on sumatriptan PRN. According to pt, no recent migraine headaches.  - Pain control with IV morphine PRN and PO Tylenol PRN as above  - Resume sumatriptan 50 mg q8hrs PRN on discharge

## 2024-06-12 NOTE — H&P ADULT - PROBLEM SELECTOR PLAN 1
Pt with 2-3 days of L flank pain, suprapubic pain, dysuria, chills, nausea, and vomiting. UA with small LE, 10 WBC, and occasional bacteria but large blood and 175 RBC, though pt on daily prophylaxis with Keflex. CTAP with IV contrast demonstrating mild left periureteric stranding, possibly from mild UTI. On exam, pt with L CVA tenderness. Concerning for acute UTI, possibly ureteritis vs. pyelonephritis. Pt did not meet SIRS criteria on admission.  - Pt with urine cx in March 2024 with >100,000 cfu of Enterococcus faecium (?Vanc resistant as per pt) and in April 2024 with >100,000 cfu of Citrobacter werkmanii  - S/p Zosyn 3.375 g x1 in ED. C/w Zosyn 3.375 g q8hrs for now, as pt with possible history of VRE, though with unclear susceptibility to ampicillin.   - F/u urine cx and blood cxs  - Pain control with IV morphine 2 mg q6hrs PRN for moderate pain and 4 mg q6hrs PRN for severe pain  - IV Zofran 4 mg q6hrs PRN for nausea and/or vomiting  - CTAP showing L ureteral stent in place with no evidence hydronephrosis --> monitor urine output  - ID consult to be called in AM given history of recurrent UTIs even while on daily prophylaxis with Keflex Pt with 2-3 days of L flank pain, suprapubic pain, dysuria, chills, nausea, and vomiting. UA with small LE, 10 WBC, and occasional bacteria but large blood and 175 RBC, though pt on daily prophylaxis with Keflex. CTAP with IV contrast demonstrating mild left periureteric stranding, possibly from mild UTI. On exam, pt with L CVA tenderness. Concerning for acute UTI, possibly ureteritis vs. pyelonephritis. Pt did not meet SIRS criteria on admission.  - Pt with urine cx in March 2024 with >100,000 cfu of Enterococcus faecium (?Vanc resistant as per pt) and in April 2024 with >100,000 cfu of Citrobacter werkmanii  - S/p Zosyn 3.375 g x1 in ED. C/w Zosyn 3.375 g q8hrs for now, as pt with possible history of VRE, though with unclear susceptibility to ampicillin.   - F/u urine cx and blood cxs  - Pain control with IV morphine 2 mg q6hrs PRN for moderate pain and 4 mg q6hrs PRN for severe pain  - IV Zofran 4 mg q6hrs PRN for nausea and/or vomiting  - CTAP showing L ureteral stent in place with no evidence hydronephrosis --> monitor urine output  - ID consult placed given history of recurrent UTIs even while on daily prophylaxis with Keflex  - Hold prophylactic Keflex for now

## 2024-06-12 NOTE — PROGRESS NOTE ADULT - PROBLEM SELECTOR PLAN 8
Pt with history of chronic constipation. CTAP with no evidence of bowel obstruction or large stool burden.  - C/w Senna and Miralax standing while inpatient  - C/w Linzess 72 mcg daily Pt with history of chronic constipation. CTAP with no evidence of bowel obstruction or large stool burden.  - C/w Senna and Miralax standing while inpatient - pt report home Mirlax is 2 caps bid, will give 17g tid for now; home senna 2 bid...  - C/w Linzess 72 mcg daily

## 2024-06-12 NOTE — PROGRESS NOTE ADULT - PROBLEM SELECTOR PLAN 1
Pt with 2-3 days of L flank pain, suprapubic pain, dysuria, chills, nausea, and vomiting. UA with small LE, 10 WBC, and occasional bacteria but large blood and 175 RBC, though pt on daily prophylaxis with Keflex. CTAP with IV contrast demonstrating mild left periureteric stranding, possibly from mild UTI. On exam, pt with L CVA tenderness. Concerning for acute UTI, possibly ureteritis vs. pyelonephritis. Pt did not meet SIRS criteria on admission.  - Pt with urine cx in March 2024 with >100,000 cfu of Enterococcus faecium (?Vanc resistant as per pt) and in April 2024 with >100,000 cfu of Citrobacter werkmanii  - S/p Zosyn 3.375 g x1 in ED. C/w Zosyn 3.375 g q8hrs for now, as pt with possible history of VRE, though with unclear susceptibility to ampicillin.   - F/u urine cx and blood cxs  - Pain control with IV morphine 2 mg q6hrs PRN for moderate pain and 4 mg q6hrs PRN for severe pain  - IV Zofran 4 mg q6hrs PRN for nausea and/or vomiting  - CTAP showing L ureteral stent in place with no evidence hydronephrosis --> monitor urine output  - ID consult placed given history of recurrent UTIs even while on daily prophylaxis with Keflex  - Hold prophylactic Keflex for now Pt with 2-3 days of L flank pain, suprapubic pain, dysuria, chills, nausea, and vomiting. UA with small LE, 10 WBC, and occasional bacteria but large blood and 175 RBC, though pt on daily prophylaxis with Keflex. CTAP with IV contrast demonstrating mild left periureteric stranding, possibly from mild UTI. On exam, pt with L CVA tenderness. Concerning for acute UTI, possibly ureteritis vs. pyelonephritis. Pt did not meet SIRS criteria on admission.  - Pt with urine cx in March 2024 with >100,000 cfu of Enterococcus faecium (?Vanc resistant as per pt) and in April 2024 with >100,000 cfu of Citrobacter werkmanii  - S/p Zosyn 3.375 g x1 in ED. C/w Zosyn 3.375 g q8hrs for now, as pt with possible history of VRE, though with unclear susceptibility to ampicillin.   - F/u urine cx and blood cxs  - Pain control with IV morphine 2 mg q6hrs PRN for moderate pain and 4 mg q6hrs PRN for severe pain  - IV Zofran 4 mg q6hrs PRN for nausea and/or vomiting  - CTAP showing L ureteral stent in place with no evidence hydronephrosis --> monitor urine output  - ID consult placed given history of recurrent UTIs even while on daily prophylaxis with Keflex --> f/u ID input/cx's...  - Hold prophylactic Keflex for now

## 2024-06-12 NOTE — H&P ADULT - HISTORY OF PRESENT ILLNESS
44 yo woman with history of SLE (positive antibodies, but asymptomatic and not on any meds), chronic lower back pain 2/2 ankylosing spondylitis, hypothyroidism, anxiety, migraine headaches, ruptured appendix s/p appendectomy (at 13 years old), and endometriosis s/p hysterectomy (10/2023 at [facility]) c/b ureterovaginal fistula with recurrent UTIs, including pyelonephritis, and L ureteral stricture s/p stent placement x2 (most recently on 5/24/24 at [facility]) presents with L flank pain, suprapubic pain, and dysuria for the past 2-3 days. Flank pain feels like a heavy, squeezing sensation. Pt also with URI symptoms (rhinorrhea, sore throat), chills, nausea, and NBNB vomiting for the past 2-3 days. No fever. Pt went to urgent care on Tuesday for her URI symptoms, but pt was referred to the ED due to concern for pyelonephritis. UA at urgent care was negative for leuk esterase and nitrite but positive for blood. Pt states that she is on Keflex daily for prophylaxis given her recurrent UTIs. Pt had an admission at [facility] in March this year for sepsis 2/2 pyelonephritis. At that time, pt's urine cx had >100,000 cfu of Enterococcus faecium. Pt also had another UTI in April with Citrobacter werkmanii. Pt denies any chest pain, cough, diarrhea, melena, BRBPR, or gross hematuria. Pt reports having shortness of breath and palpitations due to her flank and suprapubic pain.

## 2024-06-12 NOTE — H&P ADULT - NSHPPHYSICALEXAM_GEN_ALL_CORE
Vital Signs Last 24 Hrs  T(C): 36.6 (12 Jun 2024 07:53), Max: 37 (11 Jun 2024 17:30)  T(F): 97.8 (12 Jun 2024 07:53), Max: 98.6 (11 Jun 2024 17:30)  HR: 82 (12 Jun 2024 07:53) (80 - 127)  BP: 110/78 (12 Jun 2024 07:53) (105/70 - 115/77)  BP(mean): --  RR: 17 (12 Jun 2024 07:53) (16 - 18)  SpO2: 98% (12 Jun 2024 07:53) (98% - 100%)    PHYSICAL EXAM:  General: Awake and alert.  No acute distress.  Head: Normocephalic, atraumatic.    Eyes: PERRL.  EOMI.  No scleral icterus.  No conjunctival pallor.  Mouth: Moist MM.  No oropharyngeal exudates.    Neck: Supple.  Full range of motion.  No JVD.  No LAD.  No thyromegaly.  Trachea midline.    Heart: RRR.  Normal S1 and S2.  No murmurs, rubs, or gallops.  No LE edema b/l.   Lungs: Nonlabored breathing.  Good inspiratory effort.  CTAB.  No wheezes, crackles, or rhonchi.    Abdomen: BS+, soft, mild TTP in suprapubic area with no rebound or guarding, nondistended.  No hepatomegaly.  L CVA tenderness, no R CVA tenderness.  Skin: Warm and dry.  No rashes.  Extremities: No cyanosis.  2+ peripheral pulses b/l.  Musculoskeletal: No joint deformities.  No spinal or paraspinal tenderness.  Neuro: A&Ox3.  CN II-XII intact.  5/5 motor strength in UE and LE b/l.  Tactile sensation intact in UE and LE b/l.  No focal deficits.  Psychiatric: Normal mood, appropriate affect.  No SI or HI.

## 2024-06-12 NOTE — PROGRESS NOTE ADULT - SUBJECTIVE AND OBJECTIVE BOX
[facility] Division of Hospital Medicine  Randi Otero MD  Pager (M-F, 8A-5P):  In-house pager [phone]; Long-range pager [phone]  Other Times:  Please page Hospitalist in Charge -  In-house pager [phone]    Patient is a 43y old  Female who presents with a chief complaint of UTI (12 Jun 2024 08:12)    SUBJECTIVE / OVERNIGHT EVENTS:  No problems reported over night.    ADDITIONAL REVIEW OF SYSTEMS:    MEDICATIONS  (STANDING):  cholecalciferol 1000 Unit(s) Oral daily  enoxaparin Injectable 40 milliGRAM(s) SubCutaneous every 24 hours  levothyroxine 112 MICROGram(s) Oral daily  lidocaine   4% Patch 1 Patch Transdermal every 24 hours  linaclotide 72 MICROGram(s) Oral before breakfast  nortriptyline 20 milliGRAM(s) Oral at bedtime  pantoprazole    Tablet 40 milliGRAM(s) Oral before breakfast  piperacillin/tazobactam IVPB.. 3.375 Gram(s) IV Intermittent every 8 hours  polyethylene glycol 3350 17 Gram(s) Oral daily  senna 2 Tablet(s) Oral at bedtime    MEDICATIONS  (PRN):  acetaminophen     Tablet .. 650 milliGRAM(s) Oral every 6 hours PRN Temp greater or equal to 38C (100.4F), Mild Pain (1 - 3)  albuterol    90 MICROgram(s) HFA Inhaler 2 Puff(s) Inhalation every 6 hours PRN for shortness of breath and/or wheezing  ALPRAZolam 0.5 milliGRAM(s) Oral at bedtime PRN Sleep  benzocaine/menthol Lozenge 1 Lozenge Oral every 6 hours PRN Sore Throat  fluticasone propionate 50 MICROgram(s)/spray Nasal Spray 1 Spray(s) Both Nostrils two times a day PRN Nasal congestion  morphine  - Injectable 4 milliGRAM(s) IV Push every 6 hours PRN Severe Pain (7 - 10)  morphine  - Injectable 2 milliGRAM(s) IV Push every 6 hours PRN Moderate Pain (4 - 6)  ondansetron Injectable 4 milliGRAM(s) IV Push every 6 hours PRN Nausea and/or Vomiting    PHYSICAL EXAM:  Vital Signs Last 24 Hrs  T(C): 36.5 (12 Jun 2024 10:30), Max: 37 (11 Jun 2024 17:30)  T(F): 97.7 (12 Jun 2024 10:30), Max: 98.6 (11 Jun 2024 17:30)  HR: 76 (12 Jun 2024 10:30) (76 - 127)  BP: 131/78 (12 Jun 2024 10:30) (105/70 - 131/78)  BP(mean): --  RR: 17 (12 Jun 2024 10:30) (16 - 18)  SpO2: 100% (12 Jun 2024 10:30) (98% - 100%)    Parameters below as of 12 Jun 2024 10:30  Patient On (Oxygen Delivery Method): room air    CONSTITUTIONAL: NAD, well-developed, well-groomed  EYES: PERRLA; conjunctiva and sclera clear  ENMT: Moist oral mucosa, no pharyngeal injection or exudates; normal dentition  NECK: Supple, no palpable masses; no thyromegaly  RESPIRATORY: Normal respiratory effort; lungs are clear to auscultation bilaterally  CARDIOVASCULAR: Regular rate and rhythm, normal S1 and S2, no murmur/rub/gallop; No lower extremity edema  L CVA tenderness  ABDOMEN: Nontender to palpation, normoactive bowel sounds, no rebound/guarding; No hepatosplenomegaly  MUSCLOSKELETAL:  Normal gait; no clubbing or cyanosis of digits; no joint swelling or tenderness to palpation  PSYCH: A+O to person, place, and time; affect appropriate  NEUROLOGY: CN 2-12 are intact and symmetric; no gross sensory deficits;   SKIN: No rashes; no palpable lesions    LABS:                        10.4   6.80  )-----------( 376      ( 12 Jun 2024 06:58 )             34.1     06-12    141  |  107  |  10  ----------------------------<  96  3.7   |  25  |  0.63    Ca    9.2      12 Jun 2024 06:58  Phos  4.1     06-12  Mg     2.20     06-12    TPro  7.6  /  Alb  4.0  /  TBili  <0.2  /  DBili  x   /  AST  12  /  ALT  25  /  AlkPhos  165<H>  06-11    PT/INR - ( 11 Jun 2024 17:10 )   PT: 11.7 sec;   INR: 1.05 ratio    PTT - ( 11 Jun 2024 17:10 )  PTT:33.5 sec    RADIOLOGY & ADDITIONAL TESTS:  Results Reviewed:   Imaging Personally Reviewed:  Electrocardiogram Personally Reviewed:    COORDINATION OF CARE:  Care Discussed with Consultants/Other Providers [Y/N]: RN, SW, CM, ACP re overall care  Prior or Outpatient Records Reviewed [Y/N]:   [facility] Division of Hospital Medicine  Randi Otero MD  Pager (M-F, 8A-5P):  In-house pager [phone]; Long-range pager [phone]  Other Times:  Please page Hospitalist in Charge -  In-house pager [phone]    Patient is a 43y old  Female who presents with a chief complaint of UTI (12 Jun 2024 08:12)    SUBJECTIVE / OVERNIGHT EVENTS:  No problems reported over night.  Seen resting on stretcher with  at bedside. Reviewed complications after hys last year, ureteral stents, one remains in.   C/o L flank pain, wrapping around to front. Has report on phone of prev Ucx enterococcus collin and Citrobacter but don't have sensitivities.    ADDITIONAL REVIEW OF SYSTEMS:    MEDICATIONS  (STANDING):  cholecalciferol 1000 Unit(s) Oral daily  enoxaparin Injectable 40 milliGRAM(s) SubCutaneous every 24 hours  levothyroxine 112 MICROGram(s) Oral daily  lidocaine   4% Patch 1 Patch Transdermal every 24 hours  linaclotide 72 MICROGram(s) Oral before breakfast  nortriptyline 20 milliGRAM(s) Oral at bedtime  pantoprazole    Tablet 40 milliGRAM(s) Oral before breakfast  piperacillin/tazobactam IVPB.. 3.375 Gram(s) IV Intermittent every 8 hours  polyethylene glycol 3350 17 Gram(s) Oral daily  senna 2 Tablet(s) Oral at bedtime    MEDICATIONS  (PRN):  acetaminophen     Tablet .. 650 milliGRAM(s) Oral every 6 hours PRN Temp greater or equal to 38C (100.4F), Mild Pain (1 - 3)  albuterol    90 MICROgram(s) HFA Inhaler 2 Puff(s) Inhalation every 6 hours PRN for shortness of breath and/or wheezing  ALPRAZolam 0.5 milliGRAM(s) Oral at bedtime PRN Sleep  benzocaine/menthol Lozenge 1 Lozenge Oral every 6 hours PRN Sore Throat  fluticasone propionate 50 MICROgram(s)/spray Nasal Spray 1 Spray(s) Both Nostrils two times a day PRN Nasal congestion  morphine  - Injectable 4 milliGRAM(s) IV Push every 6 hours PRN Severe Pain (7 - 10)  morphine  - Injectable 2 milliGRAM(s) IV Push every 6 hours PRN Moderate Pain (4 - 6)  ondansetron Injectable 4 milliGRAM(s) IV Push every 6 hours PRN Nausea and/or Vomiting    PHYSICAL EXAM:  Vital Signs Last 24 Hrs  T(C): 36.5 (12 Jun 2024 10:30), Max: 37 (11 Jun 2024 17:30)  T(F): 97.7 (12 Jun 2024 10:30), Max: 98.6 (11 Jun 2024 17:30)  HR: 76 (12 Jun 2024 10:30) (76 - 127)  BP: 131/78 (12 Jun 2024 10:30) (105/70 - 131/78)  BP(mean): --  RR: 17 (12 Jun 2024 10:30) (16 - 18)  SpO2: 100% (12 Jun 2024 10:30) (98% - 100%)    Parameters below as of 12 Jun 2024 10:30  Patient On (Oxygen Delivery Method): room air    CONSTITUTIONAL: NAD, resting on stretcher  RESPIRATORY: Normal respiratory effort; lungs are clear to auscultation bilaterally  CARDIOVASCULAR: Regular rate and rhythm, normal S1 and S2, no murmur/rub/gallop; No lower extremity edema  L CVA tenderness  ABDOMEN: Nontender to palpation, normoactive bowel sounds, no rebound/guarding; No hepatosplenomegaly  MUSCLOSKELETAL:  no clubbing or cyanosis of digits; no joint swelling or tenderness to palpation  PSYCH: A+O to person, place, and time; affect appropriate  NEUROLOGY: CN 2-12 are intact and symmetric; no gross sensory deficits;   SKIN: No rashes; no palpable lesions    LABS:                        10.4   6.80  )-----------( 376      ( 12 Jun 2024 06:58 )             34.1     06-12    141  |  107  |  10  ----------------------------<  96  3.7   |  25  |  0.63    Ca    9.2      12 Jun 2024 06:58  Phos  4.1     06-12  Mg     2.20     06-12    TPro  7.6  /  Alb  4.0  /  TBili  <0.2  /  DBili  x   /  AST  12  /  ALT  25  /  AlkPhos  165<H>  06-11    PT/INR - ( 11 Jun 2024 17:10 )   PT: 11.7 sec;   INR: 1.05 ratio    PTT - ( 11 Jun 2024 17:10 )  PTT:33.5 sec    RADIOLOGY & ADDITIONAL TESTS:  Results Reviewed:   Imaging Personally Reviewed:  Electrocardiogram Personally Reviewed:    COORDINATION OF CARE:  Care Discussed with Consultants/Other Providers [Y/N]: RN, SW, CM, ACP re overall care  Prior or Outpatient Records Reviewed [Y/N]:

## 2024-06-12 NOTE — H&P ADULT - PROBLEM SELECTOR PLAN 7
S/p hysterectomy (10/2023 at [facility]) c/b ureterovaginal fistula with recurrent UTIs, including pyelonephritis, and L ureteral stricture s/p stent placement x2 (most recently on 5/24/24 at [facility]). Pt now on estradiol transdermal patch for hormone replacement therapy.  - C/w estradiol transdermal patch on discharge none

## 2024-06-12 NOTE — H&P ADULT - PROBLEM SELECTOR PLAN 8
Pt with history of chronic constipation. CTAP with no evidence of bowel obstruction or large stool burden.  - C/w Senna and Miralax standing while inpatient  - C/w Linzess 72 mcg daily

## 2024-06-12 NOTE — H&P ADULT - PROBLEM SELECTOR PLAN 2
Pt reporting 2-3 days of rhinorrhea and sore throat, also with chills. Per pt, tested negative for Strep throat.   - Check COVID-19/Flu/RSV swab  - Supportive care with PO Tylenol, Cepacol, and Flonase PRN

## 2024-06-12 NOTE — H&P ADULT - NSICDXPASTMEDICALHX_GEN_ALL_CORE_FT
PAST MEDICAL HISTORY:  Anxiety     Endometriosis Stage 4    Endometriosis     Hashimoto's disease     Hypothyroid     Lupus     Migraine

## 2024-06-12 NOTE — CONSULT NOTE ADULT - SUBJECTIVE AND OBJECTIVE BOX
HPI:  42 yo woman with history of SLE (positive antibodies, but asymptomatic and not on any meds), chronic lower back pain 2/2 ankylosing spondylitis, hypothyroidism, anxiety, migraine headaches, ruptured appendix s/p appendectomy (at 13 years old), and endometriosis s/p hysterectomy (10/2023 at [facility]) c/b ureterovaginal fistula with recurrent UTIs, including pyelonephritis, and L ureteral stricture s/p stent placement x2 (most recently on 5/24/24 at [facility]) presents with L flank pain, suprapubic pain, and dysuria for the past 2-3 days. Flank pain feels like a heavy, squeezing sensation. Pt also with URI symptoms (rhinorrhea, sore throat), chills, nausea, and NBNB vomiting for the past 2-3 days. No fever. Pt went to urgent care on Tuesday for her URI symptoms, but pt was referred to the ED due to concern for pyelonephritis. UA at urgent care was negative for leuk esterase and nitrite but positive for blood. Pt states that she is on Keflex daily for prophylaxis given her recurrent UTIs. Pt had an admission at [facility] in March this year for sepsis 2/2 pyelonephritis. At that time, pt's urine cx had >100,000 cfu of Enterococcus faecium. Pt also had another UTI in April with Citrobacter werkmanii. Pt denies any chest pain, cough, diarrhea, melena, BRBPR, or gross hematuria. Pt reports having shortness of breath and palpitations due to her flank and suprapubic pain. (12 Jun 2024 08:12)      PAST MEDICAL & SURGICAL HISTORY:  Migraine      Endometriosis  Stage 4      Hypothyroid      Hashimoto's disease      Lupus      Anxiety      Endometriosis      H/O laparoscopy      H/O bilateral oophorectomy      History of appendectomy  ruptured          Allergies    No Known Drug Allergies  latex (Rash)    Intolerances        ANTIMICROBIALS:  piperacillin/tazobactam IVPB.. 3.375 every 8 hours      OTHER MEDS:  acetaminophen     Tablet .. 650 milliGRAM(s) Oral every 6 hours PRN  albuterol    90 MICROgram(s) HFA Inhaler 2 Puff(s) Inhalation every 6 hours PRN  ALPRAZolam 0.5 milliGRAM(s) Oral at bedtime PRN  benzocaine/menthol Lozenge 1 Lozenge Oral every 6 hours PRN  cholecalciferol 1000 Unit(s) Oral daily  enoxaparin Injectable 40 milliGRAM(s) SubCutaneous every 24 hours  fluticasone propionate 50 MICROgram(s)/spray Nasal Spray 1 Spray(s) Both Nostrils two times a day PRN  levothyroxine 112 MICROGram(s) Oral daily  lidocaine   4% Patch 1 Patch Transdermal every 24 hours  linaclotide 72 MICROGram(s) Oral before breakfast  morphine  - Injectable 4 milliGRAM(s) IV Push every 6 hours PRN  morphine  - Injectable 2 milliGRAM(s) IV Push every 6 hours PRN  nortriptyline 20 milliGRAM(s) Oral at bedtime  ondansetron Injectable 4 milliGRAM(s) IV Push every 6 hours PRN  pantoprazole    Tablet 40 milliGRAM(s) Oral before breakfast  polyethylene glycol 3350 17 Gram(s) Oral <User Schedule>  senna 2 Tablet(s) Oral two times a day      SOCIAL HISTORY:  , lives with family  no smoking, alcohol or drug abuse  no recent travel    FAMILY HISTORY:  Family history of myocardial infarction (Mother)    Family history of cerebrovascular accident (CVA) (Mother)        ROS:    All other systems negative     Constitutional: no fever, no chills, no weight loss, no night sweats  Eye: no eye pain, no redness, no vision changes  ENT:  no sore throat, no rhinorrhea  Cardiovascular:  no chest pain, no palpitation  Respiratory:  no SOB, no cough  GI:  no abd pain, no vomiting, no diarrhea  urinary: + dysuria, no hematuria, L flank pain  : no vaginal discharge or bleeding  musculoskeletal:  no joint pain, no joint swelling  skin:  no rash  neurology:  no headache, no seizure, no change in mental status  psych: no anxiety, no depression     Physical Exam:    General:    NAD, non toxic  Head: atraumatic, normocephalic  Eyes: normal sclera and conjunctiva  ENT:   no oropharyngeal lesions, no LAD, neck supple  Cardio:    regular S1,S2, no murmur  Respiratory:   clear b/l, no wheezing  abd:   soft, BS +, not tender  :     L CVAT, + suprapubic tenderness, no navarro  Musculoskeletal : no joint swelling, no edema  Skin:    no rash  vascular: no phlebitis  Neurologic:     no focal deficits  psych: normal affect      Drug Dosing Weight  Height (cm): 157.5 (11 Jun 2024 14:33)  Weight (kg): 71.2 (11 Jun 2024 14:33)  BMI (kg/m2): 28.7 (11 Jun 2024 14:33)  BSA (m2): 1.72 (11 Jun 2024 14:33)    Vital Signs Last 24 Hrs  T(F): 97.7 (06-12-24 @ 10:30), Max: 98.6 (06-11-24 @ 17:30)    Vital Signs Last 24 Hrs  HR: 76 (06-12-24 @ 10:30) (76 - 107)  BP: 131/78 (06-12-24 @ 10:30) (105/70 - 131/78)  RR: 17 (06-12-24 @ 10:30)  SpO2: 100% (06-12-24 @ 10:30) (98% - 100%)  Wt(kg): --                          10.4   6.80  )-----------( 376      ( 12 Jun 2024 06:58 )             34.1       06-12    141  |  107  |  10  ----------------------------<  96  3.7   |  25  |  0.63    Ca    9.2      12 Jun 2024 06:58  Phos  4.1     06-12  Mg     2.20     06-12    TPro  7.6  /  Alb  4.0  /  TBili  <0.2  /  DBili  x   /  AST  12  /  ALT  25  /  AlkPhos  165<H>  06-11      Urinalysis Basic - ( 12 Jun 2024 06:58 )    Color: x / Appearance: x / SG: x / pH: x  Gluc: 96 mg/dL / Ketone: x  / Bili: x / Urobili: x   Blood: x / Protein: x / Nitrite: x   Leuk Esterase: x / RBC: x / WBC x   Sq Epi: x / Non Sq Epi: x / Bacteria: x        MICROBIOLOGY:  v              RADIOLOGY:    Images independently visualized and reviewed personally,  findings as below  < from: CT Abdomen and Pelvis w/ IV Cont (06.11.24 @ 23:19) >  IMPRESSION:  No pulmonary embolus. Negative CTA chest.    Left ureteral stent in place with no hydronephrosis. Mild left   periureteric stranding could represent postprocedural changes or mild UTI.

## 2024-06-13 LAB
ADD ON TEST-SPECIMEN IN LAB: SIGNIFICANT CHANGE UP
ANION GAP SERPL CALC-SCNC: 12 MMOL/L — SIGNIFICANT CHANGE UP (ref 7–14)
BUN SERPL-MCNC: 9 MG/DL — SIGNIFICANT CHANGE UP (ref 7–23)
CALCIUM SERPL-MCNC: 9.4 MG/DL — SIGNIFICANT CHANGE UP (ref 8.4–10.5)
CHLORIDE SERPL-SCNC: 104 MMOL/L — SIGNIFICANT CHANGE UP (ref 98–107)
CO2 SERPL-SCNC: 24 MMOL/L — SIGNIFICANT CHANGE UP (ref 22–31)
CREAT SERPL-MCNC: 0.67 MG/DL — SIGNIFICANT CHANGE UP (ref 0.5–1.3)
CULTURE RESULTS: SIGNIFICANT CHANGE UP
EGFR: 112 ML/MIN/1.73M2 — SIGNIFICANT CHANGE UP
GLUCOSE SERPL-MCNC: 103 MG/DL — HIGH (ref 70–99)
HCT VFR BLD CALC: 32.6 % — LOW (ref 34.5–45)
HGB BLD-MCNC: 10.6 G/DL — LOW (ref 11.5–15.5)
MAGNESIUM SERPL-MCNC: 2.3 MG/DL — SIGNIFICANT CHANGE UP (ref 1.6–2.6)
MCHC RBC-ENTMCNC: 20.9 PG — LOW (ref 27–34)
MCHC RBC-ENTMCNC: 32.5 GM/DL — SIGNIFICANT CHANGE UP (ref 32–36)
MCV RBC AUTO: 64.2 FL — LOW (ref 80–100)
NRBC # BLD: 0 /100 WBCS — SIGNIFICANT CHANGE UP (ref 0–0)
NRBC # FLD: 0 K/UL — SIGNIFICANT CHANGE UP (ref 0–0)
PHOSPHATE SERPL-MCNC: 4.8 MG/DL — HIGH (ref 2.5–4.5)
PLATELET # BLD AUTO: 378 K/UL — SIGNIFICANT CHANGE UP (ref 150–400)
POTASSIUM SERPL-MCNC: 3.6 MMOL/L — SIGNIFICANT CHANGE UP (ref 3.5–5.3)
POTASSIUM SERPL-SCNC: 3.6 MMOL/L — SIGNIFICANT CHANGE UP (ref 3.5–5.3)
RBC # BLD: 5.08 M/UL — SIGNIFICANT CHANGE UP (ref 3.8–5.2)
RBC # FLD: 17.2 % — HIGH (ref 10.3–14.5)
SODIUM SERPL-SCNC: 140 MMOL/L — SIGNIFICANT CHANGE UP (ref 135–145)
SPECIMEN SOURCE: SIGNIFICANT CHANGE UP
T4 FREE SERPL-MCNC: 0.9 NG/DL — SIGNIFICANT CHANGE UP (ref 0.9–1.8)
WBC # BLD: 7.37 K/UL — SIGNIFICANT CHANGE UP (ref 3.8–10.5)
WBC # FLD AUTO: 7.37 K/UL — SIGNIFICANT CHANGE UP (ref 3.8–10.5)

## 2024-06-13 PROCEDURE — 99232 SBSQ HOSP IP/OBS MODERATE 35: CPT | Mod: GC

## 2024-06-13 PROCEDURE — 99233 SBSQ HOSP IP/OBS HIGH 50: CPT

## 2024-06-13 RX ORDER — MORPHINE SULFATE 50 MG/1
4 CAPSULE, EXTENDED RELEASE ORAL EVERY 6 HOURS
Refills: 0 | Status: DISCONTINUED | OUTPATIENT
Start: 2024-06-13 | End: 2024-06-13

## 2024-06-13 RX ORDER — FERROUS SULFATE 325(65) MG
325 TABLET ORAL DAILY
Refills: 0 | Status: DISCONTINUED | OUTPATIENT
Start: 2024-06-13 | End: 2024-06-14

## 2024-06-13 RX ORDER — MORPHINE SULFATE 50 MG/1
2 CAPSULE, EXTENDED RELEASE ORAL EVERY 6 HOURS
Refills: 0 | Status: DISCONTINUED | OUTPATIENT
Start: 2024-06-13 | End: 2024-06-13

## 2024-06-13 RX ORDER — KETOROLAC TROMETHAMINE 30 MG/ML
15 SYRINGE (ML) INJECTION EVERY 8 HOURS
Refills: 0 | Status: DISCONTINUED | OUTPATIENT
Start: 2024-06-13 | End: 2024-06-14

## 2024-06-13 RX ORDER — CHLORHEXIDINE GLUCONATE 213 G/1000ML
1 SOLUTION TOPICAL DAILY
Refills: 0 | Status: DISCONTINUED | OUTPATIENT
Start: 2024-06-13 | End: 2024-06-14

## 2024-06-13 RX ORDER — ALPRAZOLAM 0.25 MG
0.5 TABLET ORAL AT BEDTIME
Refills: 0 | Status: DISCONTINUED | OUTPATIENT
Start: 2024-06-13 | End: 2024-06-14

## 2024-06-13 RX ORDER — METHOCARBAMOL 500 MG/1
750 TABLET, FILM COATED ORAL EVERY 8 HOURS
Refills: 0 | Status: DISCONTINUED | OUTPATIENT
Start: 2024-06-13 | End: 2024-06-14

## 2024-06-13 RX ADMIN — PANTOPRAZOLE SODIUM 40 MILLIGRAM(S): 20 TABLET, DELAYED RELEASE ORAL at 05:12

## 2024-06-13 RX ADMIN — PIPERACILLIN AND TAZOBACTAM 25 GRAM(S): 4; .5 INJECTION, POWDER, LYOPHILIZED, FOR SOLUTION INTRAVENOUS at 18:09

## 2024-06-13 RX ADMIN — PIPERACILLIN AND TAZOBACTAM 25 GRAM(S): 4; .5 INJECTION, POWDER, LYOPHILIZED, FOR SOLUTION INTRAVENOUS at 10:44

## 2024-06-13 RX ADMIN — MORPHINE SULFATE 4 MILLIGRAM(S): 50 CAPSULE, EXTENDED RELEASE ORAL at 08:52

## 2024-06-13 RX ADMIN — LIDOCAINE 1 PATCH: 4 CREAM TOPICAL at 21:52

## 2024-06-13 RX ADMIN — Medication 15 MILLIGRAM(S): at 22:20

## 2024-06-13 RX ADMIN — Medication 200 MILLIGRAM(S): at 18:10

## 2024-06-13 RX ADMIN — MORPHINE SULFATE 4 MILLIGRAM(S): 50 CAPSULE, EXTENDED RELEASE ORAL at 15:36

## 2024-06-13 RX ADMIN — Medication 1000 UNIT(S): at 13:33

## 2024-06-13 RX ADMIN — SENNA PLUS 2 TABLET(S): 8.6 TABLET ORAL at 18:13

## 2024-06-13 RX ADMIN — LINACLOTIDE 72 MICROGRAM(S): 145 CAPSULE, GELATIN COATED ORAL at 05:12

## 2024-06-13 RX ADMIN — Medication 15 MILLIGRAM(S): at 21:52

## 2024-06-13 RX ADMIN — MORPHINE SULFATE 4 MILLIGRAM(S): 50 CAPSULE, EXTENDED RELEASE ORAL at 09:07

## 2024-06-13 RX ADMIN — NORTRIPTYLINE HYDROCHLORIDE 20 MILLIGRAM(S): 10 CAPSULE ORAL at 22:07

## 2024-06-13 RX ADMIN — Medication 0.5 MILLIGRAM(S): at 22:58

## 2024-06-13 RX ADMIN — POLYETHYLENE GLYCOL 3350 17 GRAM(S): 17 POWDER, FOR SOLUTION ORAL at 13:32

## 2024-06-13 RX ADMIN — Medication 112 MICROGRAM(S): at 05:12

## 2024-06-13 RX ADMIN — POLYETHYLENE GLYCOL 3350 17 GRAM(S): 17 POWDER, FOR SOLUTION ORAL at 05:13

## 2024-06-13 RX ADMIN — PIPERACILLIN AND TAZOBACTAM 25 GRAM(S): 4; .5 INJECTION, POWDER, LYOPHILIZED, FOR SOLUTION INTRAVENOUS at 01:21

## 2024-06-13 RX ADMIN — SENNA PLUS 2 TABLET(S): 8.6 TABLET ORAL at 05:12

## 2024-06-13 RX ADMIN — MORPHINE SULFATE 4 MILLIGRAM(S): 50 CAPSULE, EXTENDED RELEASE ORAL at 00:40

## 2024-06-13 RX ADMIN — ENOXAPARIN SODIUM 40 MILLIGRAM(S): 100 INJECTION SUBCUTANEOUS at 18:14

## 2024-06-13 NOTE — PROGRESS NOTE ADULT - PROBLEM SELECTOR PLAN 3
Hgb 10.2 on admission. MCV 67.3. Pt with no S/S of active bleed at this time, but according to pt, had significant bleeding prior to and after her hysterectomy. Likely from iron deficiency anemia and possibly anemia of chronic disease.  - Check iron studies, folate, and vitamin B12  - Monitor H/H
Hgb 10.2 on admission. MCV 67.3. Pt with no S/S of active bleed at this time, but according to pt, had significant bleeding prior to and after her hysterectomy  - Iron studies suggestive of BISHOP, started on PO iron, to be continued on discharge  - Monitor H/H  - PMD/GYN f/up as outpatient

## 2024-06-13 NOTE — PHYSICAL THERAPY INITIAL EVALUATION ADULT - PERTINENT HX OF CURRENT PROBLEM, REHAB EVAL
As per chart, patient is a 43 year old woman with history of SLE (positive antibodies, but asymptomatic and not on any meds), chronic lower back pain 2/2 ankylosing spondylitis, hypothyroidism, anxiety, migraine headaches, ruptured appendix s/p appendectomy (at 13 years old), and endometriosis s/p hysterectomy (10/2023 at [facility]) c/b ureterovaginal fistula with recurrent UTIs, including pyelonephritis, and left ureteral stricture s/p stent placement x2 (most recently on 5/24/24 at [facility]) presents with left flank pain, suprapubic pain, and dysuria for the past 2-3 days.

## 2024-06-13 NOTE — PHYSICAL THERAPY INITIAL EVALUATION ADULT - PLANNED THERAPY INTERVENTIONS, PT EVAL
Patient left positioned for safety in bed in NAD, call bell in reach, all lines intact.  at bedside./bed mobility training/gait training/transfer training

## 2024-06-13 NOTE — PROGRESS NOTE ADULT - PROBLEM SELECTOR PLAN 6
- C/w nortriptyline 20 mg qHS  - C/w Xanax 0.5 mg qHS PRN for sleep  - Likely contributing to palpitations pt is experiencing
- C/w nortriptyline 20 mg qHS  - C/w Xanax 0.5 mg qHS PRN for sleep

## 2024-06-13 NOTE — PROGRESS NOTE ADULT - PROBLEM SELECTOR PLAN 10
- DVT ppx: Lovenox SQ 40 mg daily  - GI ppx: PO Protonix 40 mg daily  - Diet: Regular
- DVT ppx: Lovenox SQ 40 mg daily  - GI ppx: PO Protonix 40 mg daily  - PT eval --> No skilled needs    Discussed with  Sean at length at bedside today. Likely DC Home in 1-2 days.

## 2024-06-13 NOTE — PROGRESS NOTE ADULT - TIME BILLING
- Ordering, reviewing, and interpreting labs, testing, and imaging  - Independently obtaining a review of systems and performing a physical exam  - Reviewing consultant documentation/recommendations  - Counselling and educating patient and family regarding interpretation of aforementioned items and plan of care  - Documentation of encounter

## 2024-06-13 NOTE — PROGRESS NOTE ADULT - SUBJECTIVE AND OBJECTIVE BOX
Follow Up:  UTI    Interval History/ROS: pt afebrile, feels better no more vomiting or flank pain but still has some dysuria, blood and urine cx negative            Allergies  No Known Drug Allergies  latex (Rash)        ANTIMICROBIALS:  piperacillin/tazobactam IVPB.. 3.375 every 8 hours      OTHER MEDS:  acetaminophen     Tablet .. 650 milliGRAM(s) Oral every 6 hours PRN  albuterol    90 MICROgram(s) HFA Inhaler 2 Puff(s) Inhalation every 6 hours PRN  ALPRAZolam 0.5 milliGRAM(s) Oral at bedtime PRN  benzocaine/menthol Lozenge 1 Lozenge Oral every 6 hours PRN  cholecalciferol 1000 Unit(s) Oral daily  enoxaparin Injectable 40 milliGRAM(s) SubCutaneous every 24 hours  ferrous    sulfate 325 milliGRAM(s) Oral daily  fluticasone propionate 50 MICROgram(s)/spray Nasal Spray 1 Spray(s) Both Nostrils two times a day PRN  levothyroxine 112 MICROGram(s) Oral daily  lidocaine   4% Patch 1 Patch Transdermal every 24 hours  linaclotide 72 MICROGram(s) Oral before breakfast  morphine  - Injectable 2 milliGRAM(s) IV Push every 6 hours PRN  morphine  - Injectable 4 milliGRAM(s) IV Push every 6 hours PRN  nortriptyline 20 milliGRAM(s) Oral at bedtime  ondansetron Injectable 4 milliGRAM(s) IV Push every 6 hours PRN  pantoprazole    Tablet 40 milliGRAM(s) Oral before breakfast  polyethylene glycol 3350 17 Gram(s) Oral <User Schedule>  senna 2 Tablet(s) Oral two times a day      Vital Signs Last 24 Hrs  T(C): 37.1 (13 Jun 2024 13:26), Max: 37.1 (13 Jun 2024 13:26)  T(F): 98.7 (13 Jun 2024 13:26), Max: 98.7 (13 Jun 2024 13:26)  HR: 82 (13 Jun 2024 13:26) (67 - 86)  BP: 120/69 (13 Jun 2024 13:26) (108/69 - 134/92)  BP(mean): --  RR: 17 (13 Jun 2024 13:26) (16 - 18)  SpO2: 99% (13 Jun 2024 13:26) (97% - 100%)    Parameters below as of 13 Jun 2024 13:26  Patient On (Oxygen Delivery Method): room air        Physical Exam:  General:    NAD,  non toxic  Respiratory:  comfortable on Ra  abd:     soft, no tenderness  :   no CVAT,  no suprapubic tenderness,   no  navarro  Musculoskeletal:   no joint swelling  vascular: no phlebitis  Skin:    no rash                          10.6   7.37  )-----------( 378      ( 13 Jun 2024 07:10 )             32.6       06-13    140  |  104  |  9   ----------------------------<  103<H>  3.6   |  24  |  0.67    Ca    9.4      13 Jun 2024 07:10  Phos  4.8     06-13  Mg     2.30     06-13    TPro  7.6  /  Alb  4.0  /  TBili  <0.2  /  DBili  x   /  AST  12  /  ALT  25  /  AlkPhos  165<H>  06-11      Urinalysis Basic - ( 13 Jun 2024 07:10 )    Color: x / Appearance: x / SG: x / pH: x  Gluc: 103 mg/dL / Ketone: x  / Bili: x / Urobili: x   Blood: x / Protein: x / Nitrite: x   Leuk Esterase: x / RBC: x / WBC x   Sq Epi: x / Non Sq Epi: x / Bacteria: x        MICROBIOLOGY:  v  Clean Catch Clean Catch (Midstream)  06-11-24   <10,000 CFU/mL Normal Urogenital Genevieve  --  --      .Blood Blood-Peripheral  06-11-24   No growth at 24 hours  --  --      .Blood Blood-Peripheral  06-11-24   No growth at 24 hours  --  --                RADIOLOGY:  Images independently visualized and reviewed personally, findings as below  < from: CT Abdomen and Pelvis w/ IV Cont (06.11.24 @ 23:19) >  IMPRESSION:  No pulmonary embolus. Negative CTA chest.    Left ureteral stent in place with no hydronephrosis. Mild left   periureteric stranding could represent postprocedural changes or mild UTI.    < end of copied text >

## 2024-06-13 NOTE — PROGRESS NOTE ADULT - PROBLEM SELECTOR PLAN 2
Pt reporting 2-3 days of rhinorrhea and sore throat, also with chills. Per pt, tested negative for Strep throat.   - Check COVID-19/Flu/RSV swab  - Supportive care with PO Tylenol, Cepacol, and Flonase PRN
Pt reporting 2-3 days of rhinorrhea and sore throat, also with chills. Per pt, tested negative for Strep throat  - Limited RVP is negative  - Supportive care with PO Tylenol, Cepacol, and Flonase PRN  - CT chest is non revealing

## 2024-06-13 NOTE — PROGRESS NOTE ADULT - PROBLEM SELECTOR PLAN 7
S/p hysterectomy (10/2023 at [facility]) c/b ureterovaginal fistula with recurrent UTIs, including pyelonephritis, and L ureteral stricture s/p stent placement x2 (most recently on 5/24/24 at [facility]). Pt now on estradiol transdermal patch for hormone replacement therapy  - C/w estradiol transdermal patch on discharge
S/p hysterectomy (10/2023 at [facility]) c/b ureterovaginal fistula with recurrent UTIs, including pyelonephritis, and L ureteral stricture s/p stent placement x2 (most recently on 5/24/24 at [facility]). Pt now on estradiol transdermal patch for hormone replacement therapy.  - C/w estradiol transdermal patch on discharge

## 2024-06-13 NOTE — PROGRESS NOTE ADULT - PROBLEM SELECTOR PLAN 4
Pt with ankylosing spondylitis causing chronic lower back pain  - C/w lidocaine patch daily  - C/w nortriptyline 20 mg qHS  - Increasing lyrica to 200mg BID  - C/w robaxifen prn  - Fall risk protocol
Pt with ankylosing spondylitis causing chronic lower back pain.  - C/w lidocaine patch daily  - C/w nortriptyline 20 mg qHS  - PT consult  - Fall risk protocol

## 2024-06-13 NOTE — PROGRESS NOTE ADULT - PROBLEM SELECTOR PLAN 5
Pt with history of Hashimoto's thyroiditis causing hypothyroidism. On levothyroxine 112 mcg daily. Pt reporting recent episodes of palpitations, though appears to be in setting of flank and suprapubic pain.   - TSH on admission wnl. C/w levothyroxine 112 mcg daily.  - CTA chest with IV contrast negative for PE  - If palpitations and/or tachycardia continue despite pain being under control, possible referral to cardiology for possible Holter monitoring
Pt with history of Hashimoto's thyroiditis causing hypothyroidism. On levothyroxine 112 mcg daily. Pt reporting recent episodes of palpitations, though appears to be in setting of flank and suprapubic pain, also pt didn't take synthroid for 3 days PTA  - TSH on admission wnl. C/w levothyroxine 112 mcg daily  - FT4 on low end of normal  - CTA chest with IV contrast negative for PE  - Compliance with meds encouraged

## 2024-06-13 NOTE — PATIENT PROFILE ADULT - FALL HARM RISK - UNIVERSAL INTERVENTIONS
Bed in lowest position, wheels locked, appropriate side rails in place/Call bell, personal items and telephone in reach/Instruct patient to call for assistance before getting out of bed or chair/Non-slip footwear when patient is out of bed/[place] to call system/Physically safe environment - no spills, clutter or unnecessary equipment/Purposeful Proactive Rounding/Room/bathroom lighting operational, light cord in reach

## 2024-06-13 NOTE — PROGRESS NOTE ADULT - PROBLEM SELECTOR PLAN 9
Pt on sumatriptan PRN. According to pt, no recent migraine headaches.  - Pain control with IV morphine PRN and PO Tylenol PRN as above  - Resume sumatriptan 50 mg q8hrs PRN on discharge
Pt on sumatriptan PRN. According to pt, no recent migraine headaches  - As above, will DC morphine at this time to avoid/minimize opioid use  - Toradol 15mg IV prn ordered instead  - Resume sumatriptan 50 mg q8hrs PRN on discharge

## 2024-06-13 NOTE — PROGRESS NOTE ADULT - SUBJECTIVE AND OBJECTIVE BOX
Patient is a 43y old  Female who presents with a chief complaint of UTI (13 Jun 2024 13:44)      INTERVAL HPI/OVERNIGHT EVENTS:  Seen by me this afternoon,  Sean at bedside, several complaints, all addressed to best of capability. +Lower abdominal pain that is now a bit better, dysuria is improving as well. Tolerating PO. +Palpitations.    Review of Systems: 12 point review of systems otherwise negative    MEDICATIONS  (STANDING):  cholecalciferol 1000 Unit(s) Oral daily  enoxaparin Injectable 40 milliGRAM(s) SubCutaneous every 24 hours  ferrous    sulfate 325 milliGRAM(s) Oral daily  levothyroxine 112 MICROGram(s) Oral daily  lidocaine   4% Patch 1 Patch Transdermal every 24 hours  linaclotide 72 MICROGram(s) Oral before breakfast  nortriptyline 20 milliGRAM(s) Oral at bedtime  pantoprazole    Tablet 40 milliGRAM(s) Oral before breakfast  piperacillin/tazobactam IVPB.. 3.375 Gram(s) IV Intermittent every 8 hours  polyethylene glycol 3350 17 Gram(s) Oral <User Schedule>  pregabalin 200 milliGRAM(s) Oral two times a day  senna 2 Tablet(s) Oral two times a day    MEDICATIONS  (PRN):  acetaminophen     Tablet .. 650 milliGRAM(s) Oral every 6 hours PRN Temp greater or equal to 38C (100.4F), Mild Pain (1 - 3)  albuterol    90 MICROgram(s) HFA Inhaler 2 Puff(s) Inhalation every 6 hours PRN for shortness of breath and/or wheezing  ALPRAZolam 0.5 milliGRAM(s) Oral at bedtime PRN Sleep  benzocaine/menthol Lozenge 1 Lozenge Oral every 6 hours PRN Sore Throat  fluticasone propionate 50 MICROgram(s)/spray Nasal Spray 1 Spray(s) Both Nostrils two times a day PRN Nasal congestion  ketorolac   Injectable 15 milliGRAM(s) IV Push every 8 hours PRN Moderate to Severe pain  methocarbamol 750 milliGRAM(s) Oral every 8 hours PRN Muscle Spasm  ondansetron Injectable 4 milliGRAM(s) IV Push every 6 hours PRN Nausea and/or Vomiting      Allergies    No Known Drug Allergies  latex (Rash)    Intolerances          Vital Signs Last 24 Hrs  T(C): 37.1 (13 Jun 2024 13:26), Max: 37.1 (13 Jun 2024 13:26)  T(F): 98.7 (13 Jun 2024 13:26), Max: 98.7 (13 Jun 2024 13:26)  HR: 82 (13 Jun 2024 13:26) (67 - 85)  BP: 120/69 (13 Jun 2024 13:26) (118/80 - 134/92)  BP(mean): --  RR: 17 (13 Jun 2024 13:26) (16 - 18)  SpO2: 99% (13 Jun 2024 13:26) (97% - 100%)    Parameters below as of 13 Jun 2024 13:26  Patient On (Oxygen Delivery Method): room air      CAPILLARY BLOOD GLUCOSE            Physical Exam:    Daily     Daily   General:  Well appearing, NAD, not cachetic  HEENT:  Nonicteric, PERRLA  CV:  RRR, no murmur, no JVD  Lungs:  CTA B/L, no wheezes, rales, rhonchi  Abdomen:  Soft, mild TTP lower abdomen, no distended, positive BS  Mild CVA tenderness L side  Extremities:  2+ pulses, no c/c, no edema  Skin:  Warm and dry, no rashes  :  No naavrro  Neuro:  AAOx3, non-focal, CN II-XII grossly intact  No Restraints    LABS:                        10.6   7.37  )-----------( 378      ( 13 Jun 2024 07:10 )             32.6     06-13    140  |  104  |  9   ----------------------------<  103<H>  3.6   |  24  |  0.67    Ca    9.4      13 Jun 2024 07:10  Phos  4.8     06-13  Mg     2.30     06-13        Urinalysis Basic - ( 13 Jun 2024 07:10 )    Color: x / Appearance: x / SG: x / pH: x  Gluc: 103 mg/dL / Ketone: x  / Bili: x / Urobili: x   Blood: x / Protein: x / Nitrite: x   Leuk Esterase: x / RBC: x / WBC x   Sq Epi: x / Non Sq Epi: x / Bacteria: x          RADIOLOGY & ADDITIONAL TESTS:  Reviewed by me

## 2024-06-13 NOTE — PROGRESS NOTE ADULT - PROBLEM SELECTOR PLAN 1
Pt with 2-3 days of L flank pain, suprapubic pain, dysuria, chills, nausea, and vomiting. UA with small LE, 10 WBC, and occasional bacteria but large blood and 175 RBC, though pt on daily prophylaxis with Keflex. CTAP with IV contrast demonstrating mild left periureteric stranding, possibly from postprocedural changes or mild UTI. On exam, pt with L CVA tenderness. Concerning for acute UTI, possibly ureteritis vs. pyelonephritis  - Pt with urine cx in March 2024 with >100,000 cfu of Enterococcus faecium (?Vanc resistant as per pt) and in April 2024 with >100,000 cfu of Citrobacter werkmanii  - S/p Zosyn 3.375 g x1 in ED. C/w Zosyn 3.375 g q8hrs for now  - All cultures are NTD including UCx/BCx (likely d/t keflex suppression or infection due to culture negative organisms like ureaplasma, mycoplasma vs non infectious and post procedural inflammation)  - Apprec ID recs, c/w zosyn while in the hospital, on discharge switch to PO levofloxacin 500 qd for 5 days  - Urology f/up as outpatient  - Will DC morphine at this time to avoid/minimize opioid use, toradol 15mg IV prn ordered instead  - IV Zofran 4 mg q6hrs PRN for nausea and/or vomiting  - CTAP showing L ureteral stent in place with no evidence hydronephrosis --> monitor urine output  - Hold prophylactic Keflex for now, will ask ID [email or web address] keflex should be resumed post levaquin course

## 2024-06-13 NOTE — PROGRESS NOTE ADULT - PROBLEM SELECTOR PLAN 8
Pt with history of chronic constipation. CTAP with no evidence of bowel obstruction or large stool burden  - C/w Senna and Miralax standing while inpatient - pt report home Miralax is 2 caps bid, c/w 17g tid for now/senna  - C/w Linzess 72 mcg daily

## 2024-06-14 ENCOUNTER — TRANSCRIPTION ENCOUNTER (OUTPATIENT)
Age: 44
End: 2024-06-14

## 2024-06-14 VITALS
RESPIRATION RATE: 18 BRPM | DIASTOLIC BLOOD PRESSURE: 69 MMHG | OXYGEN SATURATION: 100 % | TEMPERATURE: 98 F | HEART RATE: 99 BPM | SYSTOLIC BLOOD PRESSURE: 113 MMHG

## 2024-06-14 LAB
ANION GAP SERPL CALC-SCNC: 15 MMOL/L — HIGH (ref 7–14)
BUN SERPL-MCNC: 8 MG/DL — SIGNIFICANT CHANGE UP (ref 7–23)
CALCIUM SERPL-MCNC: 9.8 MG/DL — SIGNIFICANT CHANGE UP (ref 8.4–10.5)
CHLORIDE SERPL-SCNC: 102 MMOL/L — SIGNIFICANT CHANGE UP (ref 98–107)
CO2 SERPL-SCNC: 21 MMOL/L — LOW (ref 22–31)
CREAT SERPL-MCNC: 0.65 MG/DL — SIGNIFICANT CHANGE UP (ref 0.5–1.3)
EGFR: 113 ML/MIN/1.73M2 — SIGNIFICANT CHANGE UP
GLUCOSE SERPL-MCNC: 81 MG/DL — SIGNIFICANT CHANGE UP (ref 70–99)
HCT VFR BLD CALC: 34.7 % — SIGNIFICANT CHANGE UP (ref 34.5–45)
HGB BLD-MCNC: 10.8 G/DL — LOW (ref 11.5–15.5)
MAGNESIUM SERPL-MCNC: 2.4 MG/DL — SIGNIFICANT CHANGE UP (ref 1.6–2.6)
MCHC RBC-ENTMCNC: 20.5 PG — LOW (ref 27–34)
MCHC RBC-ENTMCNC: 31.1 GM/DL — LOW (ref 32–36)
MCV RBC AUTO: 66 FL — LOW (ref 80–100)
NRBC # BLD: 0 /100 WBCS — SIGNIFICANT CHANGE UP (ref 0–0)
NRBC # FLD: 0 K/UL — SIGNIFICANT CHANGE UP (ref 0–0)
PHOSPHATE SERPL-MCNC: 4.5 MG/DL — SIGNIFICANT CHANGE UP (ref 2.5–4.5)
PLATELET # BLD AUTO: 398 K/UL — SIGNIFICANT CHANGE UP (ref 150–400)
POTASSIUM SERPL-MCNC: 3.9 MMOL/L — SIGNIFICANT CHANGE UP (ref 3.5–5.3)
POTASSIUM SERPL-SCNC: 3.9 MMOL/L — SIGNIFICANT CHANGE UP (ref 3.5–5.3)
RBC # BLD: 5.26 M/UL — HIGH (ref 3.8–5.2)
RBC # FLD: 16.8 % — HIGH (ref 10.3–14.5)
SODIUM SERPL-SCNC: 138 MMOL/L — SIGNIFICANT CHANGE UP (ref 135–145)
WBC # BLD: 9.16 K/UL — SIGNIFICANT CHANGE UP (ref 3.8–10.5)
WBC # FLD AUTO: 9.16 K/UL — SIGNIFICANT CHANGE UP (ref 3.8–10.5)

## 2024-06-14 PROCEDURE — 99239 HOSP IP/OBS DSCHRG MGMT >30: CPT

## 2024-06-14 PROCEDURE — 99232 SBSQ HOSP IP/OBS MODERATE 35: CPT | Mod: GC

## 2024-06-14 RX ORDER — LORATADINE 10 MG/1
10 TABLET ORAL DAILY
Refills: 0 | Status: DISCONTINUED | OUTPATIENT
Start: 2024-06-14 | End: 2024-06-14

## 2024-06-14 RX ORDER — METHOCARBAMOL 500 MG/1
1 TABLET, FILM COATED ORAL
Qty: 0 | Refills: 0 | DISCHARGE
Start: 2024-06-14

## 2024-06-14 RX ORDER — LEVOFLOXACIN 5 MG/ML
1 INJECTION, SOLUTION INTRAVENOUS
Qty: 5 | Refills: 0
Start: 2024-06-14 | End: 2024-06-18

## 2024-06-14 RX ORDER — KETOROLAC TROMETHAMINE 30 MG/ML
1 SYRINGE (ML) INJECTION
Qty: 9 | Refills: 0
Start: 2024-06-14 | End: 2024-06-16

## 2024-06-14 RX ORDER — CEPHALEXIN 500 MG
1 CAPSULE ORAL
Refills: 0 | DISCHARGE

## 2024-06-14 RX ORDER — POLYETHYLENE GLYCOL 3350 17 G/17G
17 POWDER, FOR SOLUTION ORAL
Qty: 238 | Refills: 0
Start: 2024-06-14

## 2024-06-14 RX ORDER — FERROUS SULFATE 325(65) MG
1 TABLET ORAL
Qty: 0 | Refills: 0 | DISCHARGE
Start: 2024-06-14

## 2024-06-14 RX ORDER — LORATADINE 10 MG/1
1 TABLET ORAL
Qty: 14 | Refills: 0
Start: 2024-06-14 | End: 2024-06-27

## 2024-06-14 RX ADMIN — LORATADINE 10 MILLIGRAM(S): 10 TABLET ORAL at 15:23

## 2024-06-14 RX ADMIN — METHOCARBAMOL 750 MILLIGRAM(S): 500 TABLET, FILM COATED ORAL at 11:35

## 2024-06-14 RX ADMIN — Medication 112 MICROGRAM(S): at 05:59

## 2024-06-14 RX ADMIN — PIPERACILLIN AND TAZOBACTAM 25 GRAM(S): 4; .5 INJECTION, POWDER, LYOPHILIZED, FOR SOLUTION INTRAVENOUS at 02:30

## 2024-06-14 RX ADMIN — CHLORHEXIDINE GLUCONATE 1 APPLICATION(S): 213 SOLUTION TOPICAL at 11:37

## 2024-06-14 RX ADMIN — Medication 200 MILLIGRAM(S): at 05:59

## 2024-06-14 RX ADMIN — Medication 15 MILLIGRAM(S): at 10:00

## 2024-06-14 RX ADMIN — LINACLOTIDE 72 MICROGRAM(S): 145 CAPSULE, GELATIN COATED ORAL at 06:01

## 2024-06-14 RX ADMIN — Medication 15 MILLIGRAM(S): at 09:25

## 2024-06-14 RX ADMIN — Medication 1000 UNIT(S): at 11:36

## 2024-06-14 RX ADMIN — PANTOPRAZOLE SODIUM 40 MILLIGRAM(S): 20 TABLET, DELAYED RELEASE ORAL at 06:00

## 2024-06-14 RX ADMIN — LIDOCAINE 1 PATCH: 4 CREAM TOPICAL at 06:56

## 2024-06-14 RX ADMIN — PIPERACILLIN AND TAZOBACTAM 25 GRAM(S): 4; .5 INJECTION, POWDER, LYOPHILIZED, FOR SOLUTION INTRAVENOUS at 10:56

## 2024-06-14 RX ADMIN — LIDOCAINE 1 PATCH: 4 CREAM TOPICAL at 10:04

## 2024-06-14 RX ADMIN — Medication 1 SPRAY(S): at 13:06

## 2024-06-14 RX ADMIN — BENZOCAINE AND MENTHOL 1 LOZENGE: 5; 1 LIQUID ORAL at 11:36

## 2024-06-14 NOTE — PROGRESS NOTE ADULT - SUBJECTIVE AND OBJECTIVE BOX
Follow Up:  UTI    Interval History/ROS: pt afebrile, feels better no more vomiting or flank pain, appetite is back, still has some dysuria            Allergies  No Known Drug Allergies  latex (Rash)        ANTIMICROBIALS:  piperacillin/tazobactam IVPB.. 3.375 every 8 hours      OTHER MEDS:  acetaminophen     Tablet .. 650 milliGRAM(s) Oral every 6 hours PRN  albuterol    90 MICROgram(s) HFA Inhaler 2 Puff(s) Inhalation every 6 hours PRN  ALPRAZolam 0.5 milliGRAM(s) Oral at bedtime PRN  benzocaine/menthol Lozenge 1 Lozenge Oral every 6 hours PRN  chlorhexidine 2% Cloths 1 Application(s) Topical daily  cholecalciferol 1000 Unit(s) Oral daily  enoxaparin Injectable 40 milliGRAM(s) SubCutaneous every 24 hours  ferrous    sulfate 325 milliGRAM(s) Oral daily  fluticasone propionate 50 MICROgram(s)/spray Nasal Spray 1 Spray(s) Both Nostrils two times a day PRN  ketorolac   Injectable 15 milliGRAM(s) IV Push every 8 hours PRN  levothyroxine 112 MICROGram(s) Oral daily  lidocaine   4% Patch 1 Patch Transdermal every 24 hours  linaclotide 72 MICROGram(s) Oral before breakfast  methocarbamol 750 milliGRAM(s) Oral every 8 hours PRN  nortriptyline 20 milliGRAM(s) Oral at bedtime  ondansetron Injectable 4 milliGRAM(s) IV Push every 6 hours PRN  pantoprazole    Tablet 40 milliGRAM(s) Oral before breakfast  polyethylene glycol 3350 17 Gram(s) Oral <User Schedule>  pregabalin 200 milliGRAM(s) Oral two times a day  senna 2 Tablet(s) Oral two times a day      Vital Signs Last 24 Hrs  T(C): 36.4 (14 Jun 2024 10:03), Max: 37.1 (13 Jun 2024 13:26)  T(F): 97.6 (14 Jun 2024 10:03), Max: 98.7 (13 Jun 2024 13:26)  HR: 99 (14 Jun 2024 10:03) (78 - 99)  BP: 113/69 (14 Jun 2024 10:03) (108/74 - 120/69)  BP(mean): --  RR: 18 (14 Jun 2024 10:03) (17 - 18)  SpO2: 100% (14 Jun 2024 10:03) (97% - 100%)    Parameters below as of 14 Jun 2024 10:03  Patient On (Oxygen Delivery Method): room air        Physical Exam:  General:    NAD,  non toxic  Respiratory:  comfortable on Ra  abd:     soft, no tenderness  :   no CVAT,  no suprapubic tenderness,   no  navarro  Musculoskeletal:   no joint swelling  vascular: no phlebitis  Skin:    no rash                            10.8   9.16  )-----------( 398      ( 14 Jun 2024 05:19 )             34.7       06-14    138  |  102  |  8   ----------------------------<  81  3.9   |  21<L>  |  0.65    Ca    9.8      14 Jun 2024 05:19  Phos  4.5     06-14  Mg     2.40     06-14        Urinalysis Basic - ( 14 Jun 2024 05:19 )    Color: x / Appearance: x / SG: x / pH: x  Gluc: 81 mg/dL / Ketone: x  / Bili: x / Urobili: x   Blood: x / Protein: x / Nitrite: x   Leuk Esterase: x / RBC: x / WBC x   Sq Epi: x / Non Sq Epi: x / Bacteria: x        MICROBIOLOGY:  v  Clean Catch Clean Catch (Midstream)  06-11-24   <10,000 CFU/mL Normal Urogenital Genevieve  --  --      .Blood Blood-Peripheral  06-11-24   No growth at 48 Hours  --  --      .Blood Blood-Peripheral  06-11-24   No growth at 48 Hours  --  --                RADIOLOGY:  Images independently visualized and reviewed personally, findings as below  < from: CT Abdomen and Pelvis w/ IV Cont (06.11.24 @ 23:19) >  IMPRESSION:  No pulmonary embolus. Negative CTA chest.    Left ureteral stent in place with no hydronephrosis. Mild left   periureteric stranding could represent postprocedural changes or mild UTI.      < end of copied text >

## 2024-06-14 NOTE — DISCHARGE NOTE NURSING/CASE MANAGEMENT/SOCIAL WORK - NSDCVIVACCINE_GEN_ALL_CORE_FT
Tdap; 25-Apr-2019 06:55; Nafisa Cuevas (RN); [employer]; [id number] (Exp. Date: 12-Mar-2021); IntraMuscular; Deltoid Left.; 0.5 milliLiter(s); VIS (VIS Published: 09-May-2013, VIS Presented: 25-Apr-2019);

## 2024-06-14 NOTE — DISCHARGE NOTE PROVIDER - CARE PROVIDER_API CALL
Riley Serrano  Internal Medicine  [address] [place] [address]  Phone: [phone]  Fax: [phone]  Follow Up Time:

## 2024-06-14 NOTE — DISCHARGE NOTE PROVIDER - NSDCCPCAREPLAN_GEN_ALL_CORE_FT
PRINCIPAL DISCHARGE DIAGNOSIS  Diagnosis: Acute UTI  Assessment and Plan of Treatment: You came for left flank and lower abdominal pain likely secondary to a UTI as your urine sample suggested that despite the culture being negative, likey as a result of you being on daily keflex for prophylaxis. Infectious Diseases (ID) evaluated you in the hospital and recommended to continue with antibiotics, you were on IV zosyn in the hospital and now changed to levofloxacin to be started today once a day for 5 days per ID, please take it as prescribed. Per ID recs no need to resume daily keflex after you are done with the levofloxacin.   Please follow up with your Urologist in the next 1-2 weeks after discharge.  Can take toradol as needed on discharge for next couple of days and/or tylenol as needed too. The dose of your lyrica was increased as well.      SECONDARY DISCHARGE DIAGNOSES  Diagnosis: Hypothyroidism  Assessment and Plan of Treatment: Your current TSH level is normal at 3.15, please continue to take your synthroid as prescribed and follow up with your PMD and Endocrinology in the next 2-4 weeks for repeat thyroid tests.    Diagnosis: Anemia  Assessment and Plan of Treatment: Your hemoglobin is a bit low at 10.2. Your iron levels are low so we started you on iron supplementation, please take it as prescribed.   Please follow up with your PMD and GYN as outpatient.

## 2024-06-14 NOTE — DISCHARGE NOTE NURSING/CASE MANAGEMENT/SOCIAL WORK - PATIENT PORTAL LINK FT
You can access the FollowMyHealth Patient Portal offered by [facility] by registering at the following website: [email or web address]. By joining [employer]’s FollowMyHealth portal, you will also be able to view your health information using other applications (apps) compatible with our system.

## 2024-06-14 NOTE — DISCHARGE NOTE PROVIDER - NSDCMRMEDTOKEN_GEN_ALL_CORE_FT
albuterol 90 mcg/inh inhalation aerosol: 2 puff(s) inhaled every 6 hours as needed for  shortness of breath and/or wheezing  ALPRAZolam 0.5 mg oral tablet: 1 tab(s) orally once a day (at bedtime) as needed for sleep  cholecalciferol 25 mcg (1000 intl units) oral tablet: 1 tab(s) orally once a day  estradiol 0.0375 mg/24 hours weekly transdermal film, extended release: 1 patch transdermally once a week on Wednesday  ferrous sulfate 325 mg (65 mg elemental iron) oral tablet: 1 tab(s) orally once a day  fluticasone 50 mcg/inh nasal spray: 1 spray(s) in each nostril 2 times a day as needed for nasal congestion  ketorolac 10 mg oral tablet: 1 tab(s) orally every 8 hours as needed for  severe pain x 3 days MDD: 3 tabs  levoFLOXacin 500 mg oral tablet: 1 tab(s) orally once a day  levothyroxine 112 mcg (0.112 mg) oral tablet: 1 tab(s) orally once a day  lidocaine 4% topical film: Apply topically to affected area once a day to lower back  Linzess 72 mcg oral capsule: 1 cap(s) orally once a day  loratadine 10 mg oral tablet: 1 tab(s) orally once a day  methocarbamol 750 mg oral tablet: 1 tab(s) orally every 8 hours As needed Muscle Spasm  nortriptyline 10 mg oral capsule: 2 cap(s) orally once a day (at bedtime)  ondansetron 4 mg oral tablet: 1 tab(s) orally every 8 hours as needed for  nausea  pantoprazole 40 mg oral delayed release tablet: 1 tab(s) orally once a day  polyethylene glycol 3350 oral powder for reconstitution: 17 gram(s) orally 2 times a day  pregabalin 200 mg oral capsule: 1 cap(s) orally 2 times a day MDD: 2 capsules  senna (sennosides) 8.6 mg oral tablet: 1 tab(s) orally 2 times a day  SUMAtriptan 50 mg oral tablet: 1 tab(s) orally every 8 hours as needed for  headache

## 2024-06-14 NOTE — PROGRESS NOTE ADULT - ASSESSMENT
43 f with hypothyroidism,  SLE (positive antibodies, but asymptomatic and not on any meds), chronic lower back pain 2/2 ankylosing spondylitis, hypothyroidism, ruptured appendix s/p appendectomy (at 13 years old), and endometriosis s/p hysterectomy (10/2023 at [facility]) c/b ureterovaginal fistula with recurrent UTIs (has had citrobacter and VRE outside) and L ureteral stricture s/p stent placement x2 (most recently on 5/24/24 at [facility]), has been on suppressive keflex since 2 weeks before the procedure, p/w dysuria, suprapubic and L flank pain   afebrile, no WBC  u/a positive  chest/abd CTA: No PE, negative chest CTA, L ureteral stent with no hydro, mild L periureteric stranding    hysterectomy c/b ureterovaginal fistula and ureteral stricture with recurrent UTIs on keflex suppression s/p L stent 5/24 now with dysuria and L flank pain, CT with mild L periureteric stranding which could be post procedural or mild UTI, now blood and urine cx negative, it could be because pt is on keflex suppression or infection due to culture negative organisms like ureaplasma, mycoplasma vs non infectious and post procedural inflammation    * c/w zosyn while in the hospital  * on discharge switch to PO levofloxacin 500 qd for 5 days  * pt should follow up with her uro    The above assessment and plan was discussed with the primary team    Susan Meléndez MD  contact on teams  After 5pm and on weekends call [phone]  
43 f with hypothyroidism,  SLE (positive antibodies, but asymptomatic and not on any meds), chronic lower back pain 2/2 ankylosing spondylitis, hypothyroidism, ruptured appendix s/p appendectomy (at 13 years old), and endometriosis s/p hysterectomy (10/2023 at [facility]) c/b ureterovaginal fistula with recurrent UTIs (has had citrobacter and VRE outside) and L ureteral stricture s/p stent placement x2 (most recently on 5/24/24 at [facility]), has been on suppressive keflex since 2 weeks before the procedure, p/w dysuria, suprapubic and L flank pain   afebrile, no WBC  u/a positive  chest/abd CTA: No PE, negative chest CTA, L ureteral stent with no hydro, mild L periureteric stranding    hysterectomy c/b ureterovaginal fistula and ureteral stricture with recurrent UTIs on keflex suppression s/p L stent 5/24 now with dysuria and L flank pain, CT with mild L periureteric stranding which could be post procedural or mild UTI, now blood and urine cx negative, it could be because pt is on keflex suppression or infection due to culture negative organisms like ureaplasma, mycoplasma vs non infectious and post procedural inflammation    * on zosyn here s/p 3 days  * on discharge switch to PO levofloxacin 500 qd for 5 days  * pt should follow up with her uro    The above assessment and plan was discussed with the primary team    Susan Meléndez MD  contact on teams  After 5pm and on weekends call [phone]
43F SLE (positive antibodies, but asymptomatic and not on any meds), chronic lower back pain 2/2 ankylosing spondylitis, hypothyroidism, anxiety, migraine headaches, ruptured appendix s/p appendectomy (at 13 years old), and endometriosis s/p hysterectomy (10/2023 at [facility]) c/b ureterovaginal fistula with recurrent UTIs, including pyelonephritis, and L ureteral stricture s/p stent placement x2 (most recently on 5/24/24 at [facility]) presents with L flank pain, suprapubic pain, and dysuria for the past 2-3 days, admitted with possible L ureteritis.
43F SLE (positive antibodies, but asymptomatic and not on any meds), chronic lower back pain 2/2 ankylosing spondylitis, hypothyroidism, anxiety, migraine headaches, ruptured appendix s/p appendectomy (at 13 years old), and endometriosis s/p hysterectomy (10/2023 at [facility]) c/b ureterovaginal fistula with recurrent UTIs, including pyelonephritis, and L ureteral stricture s/p stent placement x2 (most recently on 5/24/24 at [facility]) presents with L flank pain, suprapubic pain, and dysuria for 2-3 days PTA, admitted with possible L ureteritis.

## 2024-06-14 NOTE — DISCHARGE NOTE NURSING/CASE MANAGEMENT/SOCIAL WORK - NSDCPEFALRISK_GEN_ALL_CORE
For information on Fall & Injury Prevention, visit: [email or web address] OR  [email or web address] OR  https://www.cdc.gov/steadi/patient.html

## 2024-06-14 NOTE — DISCHARGE NOTE PROVIDER - HOSPITAL COURSE
43F SLE (positive antibodies, but asymptomatic and not on any meds), chronic lower back pain 2/2 ankylosing spondylitis, hypothyroidism, anxiety, migraine headaches, ruptured appendix s/p appendectomy (at 13 years old), and endometriosis s/p hysterectomy (10/2023 at [facility]) c/b ureterovaginal fistula with recurrent UTIs, including pyelonephritis, and L ureteral stricture s/p stent placement x2 (most recently on 5/24/24 at [facility]) presents with L flank pain, suprapubic pain, and dysuria for 2-3 days PTA, admitted with possible L ureteritis/UTI.    # Acute UTI.   ·  Plan: Pt with 2-3 days of L flank pain, suprapubic pain, dysuria, chills, nausea, and vomiting. UA with small LE, 10 WBC, and occasional bacteria but large blood and 175 RBC, though pt on daily prophylaxis with Keflex. CTAP with IV contrast demonstrating mild left periureteric stranding, possibly from postprocedural changes or mild UTI. On exam, pt with L CVA tenderness. Concerning for acute UTI, possibly ureteritis vs. pyelonephritis  - Pt with urine cx in March 2024 with >100,000 cfu of Enterococcus faecium (?Vanc resistant as per pt) and in April 2024 with >100,000 cfu of Citrobacter werkmanii  - S/p Zosyn 3.375 g x1 in ED. On IV Zosyn 3.375 g q8hrs in the hospital  - All cultures are NTD including UCx/BCx (likely d/t keflex suppression or infection due to culture negative organisms like ureaplasma, mycoplasma vs non infectious and post procedural inflammation)  - Apprec ID recs, c/w zosyn while in the hospital, on discharge switch to PO levofloxacin 500 qd for 5 days  - Urology f/up as outpatient  - Pain control with toradol 15mg IV prn, avoid/minimize opioid use  - IV Zofran 4 mg q6hrs PRN for nausea and/or vomiting  - CTAP showing L ureteral stent in place with no evidence hydronephrosis  - Prophylactic keflex would not be resumed after completion of antibiotics per ID recs  - Urology f/up as outpatient    # Symptoms of upper respiratory infection (URI).   ·  Plan: Pt reporting 2-3 days of rhinorrhea and sore throat, also with chills. Per pt, tested negative for Strep throat  - Limited RVP is negative  - Supportive care with PO Tylenol, Cepacol, and Flonase PRN  - Will add daily loratadine as well  - CT chest is non revealing.    # Anemia.   ·  Plan: Hgb 10.2 on admission. MCV 67.3. Pt with no S/S of active bleed at this time, but according to pt, had significant bleeding prior to and after her hysterectomy  - Iron studies suggestive of BISHOP, started on PO iron, to be continued on discharge  - Monitor H/H  - PMD/GYN f/up as outpatient.    # Chronic lower back pain.   ·  Plan: Pt with ankylosing spondylitis causing chronic lower back pain  - C/w lidocaine patch daily  - C/w nortriptyline 20 mg qHS  - Increasing lyrica to 200mg BID, to be continued on discharge  - C/w robaxifen prn    # Hypothyroidism.   ·  Plan: Pt with history of Hashimoto's thyroiditis causing hypothyroidism. On levothyroxine 112 mcg daily. Pt reporting recent episodes of palpitations, though appears to be in setting of flank and suprapubic pain, also pt didn't take synthroid for 3 days PTA  - TSH on admission wnl. C/w levothyroxine 112 mcg daily  - FT4 on low end of normal  - CTA chest with IV contrast negative for PE  - Compliance with meds encouraged.    # Anxiety.   ·  Plan: - C/w nortriptyline 20 mg qHS  - C/w Xanax 0.5 mg qHS PRN for sleep  - Likely contributing to palpitations pt is experiencing.    # Endometriosis.   ·  Plan: S/p hysterectomy (10/2023 at [facility]) c/b ureterovaginal fistula with recurrent UTIs, including pyelonephritis, and L ureteral stricture s/p stent placement x2 (most recently on 5/24/24 at [facility]). Pt now on estradiol transdermal patch for hormone replacement therapy  - C/w estradiol transdermal patch on discharge.    # Constipation.   ·  Plan: Pt with history of chronic constipation. CTAP with no evidence of bowel obstruction or large stool burden  - C/w Senna and Miralax standing while inpatient - pt report home Miralax is 2 caps bid, c/w 17g tid for now/senna  - C/w Linzess 72 mcg daily.    # History of migraine headaches.   ·  Plan: Pt on sumatriptan PRN. According to pt, no recent migraine headaches  - Pain control with toradol 15mg IV prn, avoid/minimize opioid use  - Resume sumatriptan 50 mg q8hrs PRN on discharge.    Medically stable for discharge home with PMD, Urology, Rheumatology and Endocrine follow up as outpatient.   43F SLE (positive antibodies, but asymptomatic and not on any meds), chronic lower back pain 2/2 ankylosing spondylitis, hypothyroidism, anxiety, migraine headaches, ruptured appendix s/p appendectomy (at 13 years old), and endometriosis s/p hysterectomy (10/2023 at [facility]) c/b ureterovaginal fistula with recurrent UTIs, including pyelonephritis, and L ureteral stricture s/p stent placement x2 (most recently on 5/24/24 at [facility]) presents with L flank pain, suprapubic pain, and dysuria for 2-3 days PTA, admitted with possible L ureteritis/UTI.    # Acute UTI.   ·  Plan: Pt with 2-3 days of L flank pain, suprapubic pain, dysuria, chills, nausea, and vomiting. UA with small LE, 10 WBC, and occasional bacteria but large blood and 175 RBC, though pt on daily prophylaxis with Keflex. CTAP with IV contrast demonstrating mild left periureteric stranding, possibly from postprocedural changes or mild UTI. On exam, pt with L CVA tenderness. Concerning for acute UTI, possibly ureteritis vs. pyelonephritis  - Pt with urine cx in March 2024 with >100,000 cfu of Enterococcus faecium (?Vanc resistant as per pt) and in April 2024 with >100,000 cfu of Citrobacter werkmanii  - S/p Zosyn 3.375 g x1 in ED. On IV Zosyn 3.375 g q8hrs in the hospital  - All cultures are NTD including UCx/BCx (likely d/t keflex suppression or infection due to culture negative organisms like ureaplasma, mycoplasma vs non infectious and post procedural inflammation)  - Apprec ID recs, c/w zosyn while in the hospital, on discharge switch to PO levofloxacin 500 qd for 5 days  - Urology f/up as outpatient  - Pain control with toradol 15mg IV prn, avoid/minimize opioid use  - IV Zofran 4 mg q6hrs PRN for nausea and/or vomiting  - CTAP showing L ureteral stent in place with no evidence hydronephrosis  - Prophylactic keflex would not be resumed after completion of antibiotics per ID recs  - Urology f/up as outpatient    # Symptoms of upper respiratory infection (URI).   ·  Plan: Pt reporting 2-3 days of rhinorrhea and sore throat, also with chills. Per pt, tested negative for Strep throat  - Limited RVP is negative  - Supportive care with PO Tylenol, Cepacol, and Flonase PRN  - Will add daily loratadine as well  - CT chest is non revealing.    # Anemia.   ·  Plan: Hgb 10.2 on admission. MCV 67.3. Pt with no S/S of active bleed at this time, but according to pt, had significant bleeding prior to and after her hysterectomy  - Iron studies suggestive of BISHOP, started on PO iron, to be continued on discharge  - Monitor H/H  - PMD/GYN f/up as outpatient.    # Chronic lower back pain.   ·  Plan: Pt with ankylosing spondylitis causing chronic lower back pain  - C/w lidocaine patch daily  - C/w nortriptyline 20 mg qHS  - Increasing lyrica to 200mg BID, to be continued on discharge  - C/w robaxifen prn    # Hypothyroidism.   ·  Plan: Pt with history of Hashimoto's thyroiditis causing hypothyroidism. On levothyroxine 112 mcg daily. Pt reporting recent episodes of palpitations, though appears to be in setting of flank and suprapubic pain, also pt didn't take synthroid for 3 days PTA  - TSH on admission wnl. C/w levothyroxine 112 mcg daily  - FT4 on low end of normal  - CTA chest with IV contrast negative for PE  - Compliance with meds encouraged.    # Anxiety.   ·  Plan: - C/w nortriptyline 20 mg qHS  - C/w Xanax 0.5 mg qHS PRN for sleep  - Likely contributing to palpitations pt is experiencing.    # Endometriosis.   ·  Plan: S/p hysterectomy (10/2023 at [facility]) c/b ureterovaginal fistula with recurrent UTIs, including pyelonephritis, and L ureteral stricture s/p stent placement x2 (most recently on 5/24/24 at [facility]). Pt now on estradiol transdermal patch for hormone replacement therapy  - C/w estradiol transdermal patch on discharge.    # Constipation.   ·  Plan: Pt with history of chronic constipation. CTAP with no evidence of bowel obstruction or large stool burden  - C/w Senna and Miralax standing while inpatient - pt report home Miralax is 2 caps bid, c/w 17g tid for now/senna  - C/w Linzess 72 mcg daily.    # History of migraine headaches.   ·  Plan: Pt on sumatriptan PRN. According to pt, no recent migraine headaches  - Pain control with toradol 15mg IV prn, avoid/minimize opioid use  - Resume sumatriptan 50 mg q8hrs PRN on discharge.    Medically stable for discharge home with PMD, Urology, Rheumatology and Endocrine followup as outpatient.    ISTOP  #[id number].

## 2024-06-14 NOTE — DISCHARGE NOTE PROVIDER - ATTENDING DISCHARGE PHYSICAL EXAMINATION:
VSS, NAD, doing better today, pain has improved on toradol, tolerating PO,  at bedside, eager to going home today  MMM  Chest: CTA B/L  Heart: S1S2 Ok  Abd: Soft/NT/ND  Minimal L CVA tenderness  Extrem: No edema

## 2024-06-16 LAB
CULTURE RESULTS: SIGNIFICANT CHANGE UP
CULTURE RESULTS: SIGNIFICANT CHANGE UP
SPECIMEN SOURCE: SIGNIFICANT CHANGE UP
SPECIMEN SOURCE: SIGNIFICANT CHANGE UP

## 2024-11-18 NOTE — ED ADULT TRIAGE NOTE - BEFAST FACE
No Is This A New Presentation, Or A Follow-Up?: Follow Up Sarah What Type Of Rash Do You Have?: atopic dermatitis

## 2024-11-19 NOTE — ED PROVIDER NOTE - IV ALTEPLASE EXCL REL HIDDEN
show [Chaperone Present] : A chaperone was present in the examining room during all aspects of the physical examination [No Acute Distress] : in no acute distress [Well developed] : well developed [Well Nourished] : ~L well nourished [Oriented x3] : oriented to person, place, and time [[id number]] : A chaperone was present during the pelvic exam. [No Edema] : ~T edema was not present [Warm and Dry] : was warm and dry to touch [Labia Majora] : were normal [Labia Minora] : were normal [Normal Appearance] : general appearance was normal [Normal] : was normal [FreeTextEntry2] :  Sanjana Israel [Cough] : no cough [Tenderness] : ~T no ~M abdominal tenderness observed [Distended] : not distended [FreeTextEntry4] : no mesh exposure, graft nontender

## 2025-04-03 NOTE — ED PROVIDER NOTE - CPE EDP EYES NORM
Ultrasound was used to find the vein which was compressible and without any ultrasound features of an artery or nerve bundle. Skin was cleaned and disinfected prior to IV puncture.  Under real-time ultrasound guidance peripheral access was obtained in the left forearm using 22 G 2.5\" B Momin Deep Access after 1 attempt(s). No immediate complications noted. Patient tolerated the procedure well.  Refer to IV flowsheet for further documentation.     
normal...

## 2025-04-09 NOTE — ED ADULT NURSE NOTE - NS_SISCREENINGSR_GEN_ALL_ED
Negative clear to auscultation bilaterally/no wheezes/no rales/no rhonchi/no respiratory distress/no use of accessory muscles/no subcutaneous emphysema/airway patent/breath sounds equal/good air movement/respirations non-labored

## 2025-07-29 NOTE — ED ADULT TRIAGE NOTE - TEMPERATURE IN CELSIUS (DEGREES C)
Chief complaint:   Chief Complaint   Patient presents with   • Allergies       Vitals:  Visit Vitals  /69   Pulse 99   Temp 98.8 °F (37.1 °C) (Oral)   Resp 18   Ht 5' 1\" (1.549 m)   Wt 55.3 kg (122 lb)   LMP 09/24/2024 (Exact Date)   SpO2 100%   BMI 23.05 kg/m²       HISTORY OF PRESENT ILLNESS     URI   This is a new problem. The current episode started yesterday. The problem has been gradually worsening. Associated symptoms include congestion, rhinorrhea and sneezing. Pertinent negatives include no chest pain, coughing, diarrhea, ear pain, sinus pain, sore throat, swollen glands, vomiting or wheezing. Associated symptoms comments: Subjective chills, and sweating over night. She has tried acetaminophen (OTC Azelastine, and flonase nasal sprays) for the symptoms. The treatment provided no relief.     Previously seen in  on 6/7 presenting with nearly identical symptoms, vitals significant for fever 100.6, and tachycardia, at which time she tested positive for COVID-19. Treated with Ibuprofen and PO fluids at this visit, noted that patient declined Antiviral treatment.     Other significant problems:  There are no active problems to display for this patient.      PAST MEDICAL, FAMILY AND SOCIAL HISTORY     Medications:  Current Outpatient Medications   Medication Sig Dispense Refill   • oseltamivir (TAMIFLU) 75 MG capsule Take 1 capsule by mouth in the morning and 1 capsule in the evening. Do all this for 5 days. 10 capsule 0   • fluticasone (FLONASE) 50 MCG/ACT nasal spray Spray 1 spray in each nostril in the morning and 1 spray in the evening. 16 g 0   • omeprazole (PriLOSEC) 40 MG capsule Take 1 capsule by mouth daily. 30 capsule 0   • omeprazole (PRILOSEC) 20 MG capsule Take 20 mg by mouth daily. (Patient not taking: Reported on 6/7/2025)     • clarithromycin (BIAXIN) 500 MG tablet Take 500 mg by mouth 2 times daily. (Patient not taking: Reported on 6/7/2025)     • amoxicillin (AMOXIL) 500 MG capsule Take  500 mg by mouth 3 times daily. (Patient not taking: Reported on 6/7/2025)     • naproxen (NAPROSYN) 500 MG tablet Take 1 tablet by mouth 2 times daily (with meals). As needed (Patient not taking: Reported on 6/7/2025) 30 tablet 0   • cyclobenzaprine (FLEXERIL) 10 MG tablet 1 tab po qhs prn; Sedation precautions while on medication (Patient not taking: Reported on 6/7/2025) 30 tablet 0     No current facility-administered medications for this visit.       Allergies:  ALLERGIES:   Allergen Reactions   • Egg ANAPHYLAXIS       Past Medical  History/Surgeries:  No past medical history on file.    No past surgical history on file.    Family History:  No family history on file.    Social History:  Social History     Tobacco Use   • Smoking status: Never   • Smokeless tobacco: Never   Substance Use Topics   • Alcohol use: Not Currently       REVIEW OF SYSTEMS     Review of Systems   Constitutional:  Positive for chills and fatigue.   HENT:  Positive for congestion, rhinorrhea and sneezing. Negative for ear pain, sinus pressure, sinus pain and sore throat.    Respiratory:  Negative for cough, chest tightness, shortness of breath and wheezing.    Cardiovascular:  Negative for chest pain.   Gastrointestinal:  Negative for diarrhea and vomiting.   Musculoskeletal:  Positive for myalgias.       PHYSICAL EXAM     Physical Exam  Constitutional:       General: She is not in acute distress.     Appearance: Normal appearance. She is not ill-appearing.   HENT:      Nose: Congestion and rhinorrhea present.      Mouth/Throat:      Mouth: Mucous membranes are moist.      Pharynx: Oropharynx is clear. No oropharyngeal exudate or posterior oropharyngeal erythema.      Neck: Normal range of motion and neck supple.   Eyes:      General:         Right eye: No discharge.         Left eye: No discharge.      Extraocular Movements: Extraocular movements intact.      Conjunctiva/sclera: Conjunctivae normal.   Cardiovascular:      Rate and Rhythm:  Normal rate and regular rhythm.   Pulmonary:      Effort: Pulmonary effort is normal.      Breath sounds: Normal breath sounds. No wheezing, rhonchi or rales.   Lymphadenopathy:      Cervical: No cervical adenopathy.   Skin:     Capillary Refill: Capillary refill takes less than 2 seconds.   Neurological:      General: No focal deficit present.      Mental Status: She is alert and oriented to person, place, and time.   Psychiatric:         Mood and Affect: Mood normal.         Behavior: Behavior normal.         ASSESSMENT/PLAN       1. Influenza B (Primary)  Previous Dx of Covid-19 on 6/7 presenting with near identical symptoms of runny nose, body aches, and sneezing. Previously denied anti-viral treatment, concern for Covid and other viral etiologies.   - POCT COVID/Flu/RSV Panel via [facility]: Positive for Flu B  - Low utility of CXR this visit, and patient agreeable  - Counseled on using flonase daily, as well as starting OTC antihistamine to aid runny nose, as well as keeping up with PO fluids.   - Tylenol and Ibuprofen PRN   - Educated on return precautions for worsening symptoms  - oseltamivir (TAMIFLU) 75 MG capsule; Take 1 capsule by mouth in the morning and 1 capsule in the evening. Do all this for 5 days.  Dispense: 10 capsule; Refill: 0      Patient seen, assessed, and discussed with Attending physician. See their addendum as necessary.     Jeff Gonzalez, DO   PGY-2 Internal Medicine      -----------------------------    I, Fernando Nava D.O., attest that I have reviewed the above information to its accuracy.  Agree with assessment and plan for the patient.  Patient has been seen and the plan has been reviewed with the patient by myself and Dr. Gonzalez.   36.6